# Patient Record
Sex: FEMALE | Race: WHITE | NOT HISPANIC OR LATINO | Employment: UNEMPLOYED | URBAN - METROPOLITAN AREA
[De-identification: names, ages, dates, MRNs, and addresses within clinical notes are randomized per-mention and may not be internally consistent; named-entity substitution may affect disease eponyms.]

---

## 2017-02-08 ENCOUNTER — HOSPITAL ENCOUNTER (EMERGENCY)
Facility: HOSPITAL | Age: 37
Discharge: HOME | End: 2017-02-08
Attending: EMERGENCY MEDICINE
Payer: MEDICAID

## 2017-02-08 VITALS
OXYGEN SATURATION: 95 % | DIASTOLIC BLOOD PRESSURE: 65 MMHG | BODY MASS INDEX: 19.51 KG/M2 | HEART RATE: 100 BPM | RESPIRATION RATE: 19 BRPM | TEMPERATURE: 98 F | HEIGHT: 62 IN | WEIGHT: 106 LBS | SYSTOLIC BLOOD PRESSURE: 119 MMHG

## 2017-02-08 DIAGNOSIS — K08.89 PAIN, DENTAL: Primary | ICD-10-CM

## 2017-02-08 PROCEDURE — 99283 EMERGENCY DEPT VISIT LOW MDM: CPT

## 2017-02-08 RX ORDER — HYDROCODONE BITARTRATE AND ACETAMINOPHEN 10; 325 MG/1; MG/1
1 TABLET ORAL EVERY 4 HOURS PRN
Qty: 20 TABLET | Refills: 0 | Status: SHIPPED | OUTPATIENT
Start: 2017-02-08 | End: 2017-02-24

## 2017-02-08 RX ORDER — PENICILLIN V POTASSIUM 500 MG/1
500 TABLET, FILM COATED ORAL 4 TIMES DAILY
Qty: 40 TABLET | Refills: 0 | Status: SHIPPED | OUTPATIENT
Start: 2017-02-08 | End: 2017-02-15

## 2017-02-08 RX ORDER — CLONAZEPAM 1 MG/1
1 TABLET ORAL 2 TIMES DAILY PRN
COMMUNITY
End: 2017-07-23

## 2017-02-08 NOTE — ED AVS SNAPSHOT
[facility]  [address] [address] [address]               Isabelle Bonds   2017  8:50 AM   ED    Description:  Female : 1980   Department:  [facility]           Your Care was Coordinated By:     Provider Role From To    Obi Owen MD Attending Provider 17 0851 --      Reason for Visit     Dental Pain           Diagnoses this Visit        Comments    Pain, dental    -  Primary       ED Disposition     None           To Do List           Follow-up Information     Follow up with VIVIANA Weaver. Schedule an appointment as soon as possible for a visit in 3 days.    Specialty:  Family Medicine    Why:  Follow up with your primary care doctor in the next 2-3 days for recheck.  Return to the emergency Department for any worsening signs or symptoms.    Contact information:    [address] [place] [address] [phone]          Please follow up.    Why:  Follow-up with her dentist as soon as possible as discussed.  Return to emergency Department for any worsening signs or symptoms.       These Medications        Disp Refills Start End    penicillin v potassium (VEETID) 500 MG tablet 40 tablet 0 2017 2/15/2017    Take 1 tablet (500 mg total) by mouth 4 (four) times daily. - Oral    hydrocodone-acetaminophen 10-325mg (NORCO)  mg Tab 20 tablet 0 2017    Take 1 tablet by mouth every 4 (four) hours as needed. - Oral      [facility]     [facility] On Call Nurse Care Line -  Assistance  Registered nurses in the [facility] On Call Center provide clinical advisement, health education, appointment booking, and other advisory services.  Call for this free service at [phone].             Medications           Message regarding Medications     Verify the changes and/or additions to your medication regime listed below are the same as discussed with your clinician today.  If any of these changes or additions  "are incorrect, please notify your healthcare provider.        START taking these NEW medications        Refills    penicillin v potassium (VEETID) 500 MG tablet 0    Sig: Take 1 tablet (500 mg total) by mouth 4 (four) times daily.    Class: Print    Route: Oral    hydrocodone-acetaminophen 10-325mg (NORCO)  mg Tab 0    Sig: Take 1 tablet by mouth every 4 (four) hours as needed.    Class: Print    Route: Oral           Verify that the below list of medications is an accurate representation of the medications you are currently taking.  If none reported, the list may be blank. If incorrect, please contact your healthcare provider. Carry this list with you in case of emergency.           Current Medications     clonazePAM (KLONOPIN) 1 MG tablet Take 1 mg by mouth 2 (two) times daily as needed for Anxiety.    citalopram (CELEXA) 20 MG tablet Take 20 mg by mouth 2 (two) times daily as needed.     hydrocodone-acetaminophen 10-325mg (NORCO)  mg Tab Take 1 tablet by mouth every 4 (four) hours as needed.    penicillin v potassium (VEETID) 500 MG tablet Take 1 tablet (500 mg total) by mouth 4 (four) times daily.           Clinical Reference Information           Your Vitals Were     BP Pulse Temp Resp Height Weight    117/66 (BP Location: Left arm, Patient Position: Sitting) 104 98.2 °F (36.8 °C) 20 5' 2" (1.575 m) 48.1 kg (106 lb)    Last Period SpO2 BMI          (LMP Unknown) 96% 19.39 kg/m2        Allergies as of 2/8/2017     No Known Allergies      Immunizations Administered on Date of Encounter - 2/8/2017     None      ED Micro, Lab, POCT     None      ED Imaging Orders     None      Discharge References/Attachments     DENTAL PAIN (ENGLISH)    ACETAMINOPHEN; HYDROCODONE TABLETS OR CAPSULES (ENGLISH)    PENICILLIN V TABLETS (ENGLISH)      [facility] Sign-Up     Activating your [facility] account is as easy as 1-2-3!     1) Visit [email or web address], select Sign Up Now, enter this activation code and your date of " birth, then select Next.  Activation code not generated  Current Patient Portal Status: Account disabled      2) Create a username and password to use when you visit [facility] in the future and select a security question in case you lose your password and select Next.    3) Enter your e-mail address and click Sign Up!    Additional Information  If you have questions, please e-mail [email or web address] or call [phone] to talk to our [facility] staff. Remember, [facility] is NOT to be used for urgent needs. For medical emergencies, dial 911.         Smoking Cessation     If you would like to quit smoking:   You may be eligible for free services if you are a [place] resident and started smoking cigarettes before September 1, 1988.  Call the Smoking Cessation Trust (SCT) toll free at (067) 448-5579 or (210) 420-1597.   Call 8-800-QUIT-NOW if you do not meet the above criteria.             [facility] complies with applicable Federal civil rights laws and does not discriminate on the basis of race, color, national origin, age, disability, or sex.        Language Assistance Services     ATTENTION: Language assistance services are available, free of charge. Please call [phone].      ATENCIÓN: Si habla español, tiene a cox disposición servicios gratuitos de asistencia lingüística. Llame al [phone].     CHÚ Ý: N?u b?n nói Ti?ng Vi?t, có các d?ch v? h? tr? ngôn ng? mi?n phí dành cho b?n. G?i s? [phone].

## 2017-02-10 NOTE — ED PROVIDER NOTES
Encounter Date: 2/8/2017       History     Chief Complaint   Patient presents with    Dental Pain     broke a tooth after a fall last month, took a round of antibiotics and is not any better.      Review of patient's allergies indicates:  No Known Allergies  Patient is a 37 y.o. female presenting with the following complaint: tooth pain. The history is provided by the patient.   Dental Pain   The primary symptoms include mouth pain and dental injury. Primary symptoms do not include oral bleeding, oral lesions, headaches, fever, shortness of breath, sore throat or angioedema. The symptoms began several weeks ago. The symptoms are waxing and waning. The symptoms are recurrent. The symptoms occur intermittently.   Mouth pain began more than 1 month ago. Mouth pain occurs intermittently. Mouth pain is worsening. Affected locations include: teeth. At its highest the mouth pain was at 7/10. The mouth pain is currently at 7/10.   The dental injury occurred more than 1 week ago.   Additional symptoms include: dental sensitivity to temperature, gum tenderness and jaw pain. Additional symptoms do not include: gum swelling, purulent gums, trismus, facial swelling, trouble swallowing, pain with swallowing, excessive salivation, dry mouth, taste disturbance, smell disturbance, drooling, ear pain, hearing loss, nosebleeds, swollen glands, goiter and fatigue. Medical issues include: smoking. Medical issues do not include: cancer.     Patient has history of a fractured jaw and some fractured teeth and has been treated at a dental clinic in [place].  Now she is living in this region and has no follow-up with dentistry he has continued with dental pain and has no meds.  She has no primary care physician that she is able to see today or a dentist so she came to emergency department for evaluation and some pain medication      Past Medical History   Diagnosis Date    Depression      No past medical history pertinent  negatives.  Past Surgical History   Procedure Laterality Date    Hysterectomy      Hernia repair      Cholecystectomy       Family History   Problem Relation Age of Onset    No Known Problems Mother     Arthritis Father      Social History   Substance Use Topics    Smoking status: Current Every Day Smoker     Packs/day: 1.00     Types: Cigarettes    Smokeless tobacco: None    Alcohol use Yes      Comment: occasional     Review of Systems   Constitutional: Negative for fatigue and fever.   HENT: Positive for dental problem. Negative for drooling, ear pain, facial swelling, hearing loss, nosebleeds, sore throat and trouble swallowing.    Respiratory: Negative for shortness of breath.    Cardiovascular: Negative for chest pain.   Gastrointestinal: Negative for nausea.   Genitourinary: Negative for dysuria.   Musculoskeletal: Negative for back pain.   Skin: Negative for rash.   Neurological: Negative for weakness and headaches.   Hematological: Does not bruise/bleed easily.   All other systems reviewed and are negative.      Physical Exam   Initial Vitals   BP Pulse Resp Temp SpO2   02/08/17 0848 02/08/17 0848 02/08/17 0848 02/08/17 0848 02/08/17 0848   117/66 104 20 98.2 °F (36.8 °C) 96 %     Physical Exam    Nursing note and vitals reviewed.  Constitutional: She appears well-developed and well-nourished. No distress.   HENT:   Head: Normocephalic and atraumatic.   Mouth/Throat: Uvula is midline, oropharynx is clear and moist and mucous membranes are normal. No oral lesions. No trismus in the jaw. Abnormal dentition. No dental abscesses, uvula swelling or lacerations. No oropharyngeal exudate.   Patient has poor dentition and tenderness over her gums particularly on her mandible the right hand side.  There is no swelling or fluctuance or indication of abscess or need for I&D.   Eyes: EOM are normal. Pupils are equal, round, and reactive to light.   Neck: Normal range of motion. Neck supple.   Cardiovascular:  Normal rate, regular rhythm, normal heart sounds and intact distal pulses.   Pulmonary/Chest: No stridor. She has no wheezes. She has no rhonchi.   Abdominal: Soft. Bowel sounds are normal. She exhibits no mass. There is no rebound and no guarding.   Musculoskeletal: Normal range of motion. She exhibits no edema.   Neurological: She is alert and oriented to person, place, and time. She has normal strength. No sensory deficit.   Skin: Skin is warm and dry. No rash noted.   Psychiatric: She has a normal mood and affect. Her behavior is normal. Judgment and thought content normal.         ED Course   Procedures  Labs Reviewed - No data to display                            ED Course     Clinical Impression:   The encounter diagnosis was Pain, dental.    Disposition:   Disposition: Discharged  Condition: Stable       Obi Owen MD  02/10/17 0703

## 2017-03-14 ENCOUNTER — HOSPITAL ENCOUNTER (EMERGENCY)
Facility: HOSPITAL | Age: 37
Discharge: ANOTHER HOSPITAL | End: 2017-03-14
Attending: EMERGENCY MEDICINE
Payer: MEDICAID

## 2017-03-14 VITALS
DIASTOLIC BLOOD PRESSURE: 72 MMHG | HEART RATE: 83 BPM | SYSTOLIC BLOOD PRESSURE: 152 MMHG | TEMPERATURE: 93 F | BODY MASS INDEX: 22.5 KG/M2 | RESPIRATION RATE: 18 BRPM | WEIGHT: 123 LBS | OXYGEN SATURATION: 100 %

## 2017-03-14 DIAGNOSIS — R11.10 VOMITING, INTRACTABILITY OF VOMITING NOT SPECIFIED, PRESENCE OF NAUSEA NOT SPECIFIED, UNSPECIFIED VOMITING TYPE: ICD-10-CM

## 2017-03-14 DIAGNOSIS — I62.9 SPONTANEOUS INTRAPARENCHYMAL INTRACRANIAL HEMORRHAGE, ACUTE: Primary | ICD-10-CM

## 2017-03-14 DIAGNOSIS — T17.908A ASPIRATION INTO AIRWAY, INITIAL ENCOUNTER: ICD-10-CM

## 2017-03-14 DIAGNOSIS — F12.10 MARIJUANA ABUSE: ICD-10-CM

## 2017-03-14 DIAGNOSIS — R03.0 ELEVATED BLOOD PRESSURE READING WITHOUT DIAGNOSIS OF HYPERTENSION: ICD-10-CM

## 2017-03-14 DIAGNOSIS — N39.0 URINARY TRACT INFECTION WITHOUT HEMATURIA, SITE UNSPECIFIED: ICD-10-CM

## 2017-03-14 DIAGNOSIS — G93.5: ICD-10-CM

## 2017-03-14 DIAGNOSIS — T68.XXXA HYPOTHERMIA, INITIAL ENCOUNTER: ICD-10-CM

## 2017-03-14 DIAGNOSIS — Z72.0 TOBACCO ABUSE: ICD-10-CM

## 2017-03-14 DIAGNOSIS — J96.90 RESPIRATORY FAILURE, UNSPECIFIED CHRONICITY, UNSPECIFIED WHETHER WITH HYPOXIA OR HYPERCAPNIA: ICD-10-CM

## 2017-03-14 LAB
ALBUMIN SERPL BCP-MCNC: 4.3 G/DL
ALLENS TEST: ABNORMAL
ALP SERPL-CCNC: 77 U/L
ALT SERPL W/O P-5'-P-CCNC: 23 U/L
AMPHET+METHAMPHET UR QL: NEGATIVE
ANION GAP SERPL CALC-SCNC: 17 MMOL/L
APTT BLDCRRT: 24.9 SEC
AST SERPL-CCNC: 26 U/L
BACTERIA #/AREA URNS AUTO: ABNORMAL /HPF
BARBITURATES UR QL SCN>200 NG/ML: NEGATIVE
BASOPHILS # BLD AUTO: 0.08 K/UL
BASOPHILS NFR BLD: 0.3 %
BENZODIAZ UR QL SCN>200 NG/ML: NORMAL
BILIRUB SERPL-MCNC: 0.5 MG/DL
BILIRUB UR QL STRIP: NEGATIVE
BUN SERPL-MCNC: 22 MG/DL
BZE UR QL SCN: NEGATIVE
CALCIUM SERPL-MCNC: 9 MG/DL
CANNABINOIDS UR QL SCN: NORMAL
CHLORIDE SERPL-SCNC: 104 MMOL/L
CLARITY UR REFRACT.AUTO: CLEAR
CO2 SERPL-SCNC: 17 MMOL/L
COLOR UR AUTO: YELLOW
CREAT SERPL-MCNC: 1 MG/DL
CREAT UR-MCNC: 204.4 MG/DL
DELSYS: ABNORMAL
DIFFERENTIAL METHOD: ABNORMAL
EOSINOPHIL # BLD AUTO: 0.2 K/UL
EOSINOPHIL NFR BLD: 0.8 %
ERYTHROCYTE [DISTWIDTH] IN BLOOD BY AUTOMATED COUNT: 12.2 %
ERYTHROCYTE [SEDIMENTATION RATE] IN BLOOD BY WESTERGREN METHOD: 16 MM/H
EST. GFR  (AFRICAN AMERICAN): >60 ML/MIN/1.73 M^2
EST. GFR  (NON AFRICAN AMERICAN): >60 ML/MIN/1.73 M^2
ETHANOL SERPL-MCNC: <10 MG/DL
FIO2: 40
GLUCOSE SERPL-MCNC: 143 MG/DL
GLUCOSE UR QL STRIP: NEGATIVE
HCO3 UR-SCNC: 21.4 MMOL/L (ref 24–28)
HCT VFR BLD AUTO: 39.6 %
HGB BLD-MCNC: 13.7 G/DL
HGB UR QL STRIP: ABNORMAL
INR PPP: 1.1
KETONES UR QL STRIP: ABNORMAL
LEUKOCYTE ESTERASE UR QL STRIP: ABNORMAL
LYMPHOCYTES # BLD AUTO: 6.7 K/UL
LYMPHOCYTES NFR BLD: 25 %
MCH RBC QN AUTO: 32.2 PG
MCHC RBC AUTO-ENTMCNC: 34.6 %
MCV RBC AUTO: 93 FL
METHADONE UR QL SCN>300 NG/ML: NEGATIVE
MICROSCOPIC COMMENT: ABNORMAL
MODE: ABNORMAL
MONOCYTES # BLD AUTO: 1.6 K/UL
MONOCYTES NFR BLD: 6.1 %
NEUTROPHILS # BLD AUTO: 17.9 K/UL
NEUTROPHILS NFR BLD: 67.4 %
NITRITE UR QL STRIP: NEGATIVE
OPIATES UR QL SCN: NEGATIVE
PCO2 BLDA: 30.8 MMHG (ref 35–45)
PCP UR QL SCN>25 NG/ML: NEGATIVE
PEEP: 5
PH SMN: 7.45 [PH] (ref 7.35–7.45)
PH UR STRIP: 8 [PH] (ref 5–8)
PLATELET # BLD AUTO: 329 K/UL
PMV BLD AUTO: 10.4 FL
PO2 BLDA: 135 MMHG (ref 80–100)
POC BE: -3 MMOL/L
POC SATURATED O2: 99 % (ref 95–100)
POTASSIUM SERPL-SCNC: 3.1 MMOL/L
PROT SERPL-MCNC: 7.4 G/DL
PROT UR QL STRIP: NEGATIVE
PROTHROMBIN TIME: 11.2 SEC
PS: 10
RBC # BLD AUTO: 4.25 M/UL
RBC #/AREA URNS AUTO: 2 /HPF (ref 0–4)
SAMPLE: ABNORMAL
SITE: ABNORMAL
SODIUM SERPL-SCNC: 138 MMOL/L
SP GR UR STRIP: >=1.03 (ref 1–1.03)
TOXICOLOGY INFORMATION: NORMAL
TROPONIN I SERPL DL<=0.01 NG/ML-MCNC: <0.006 NG/ML
URN SPEC COLLECT METH UR: ABNORMAL
UROBILINOGEN UR STRIP-ACNC: NEGATIVE EU/DL
VT: 450
WBC # BLD AUTO: 26.6 K/UL
WBC #/AREA URNS AUTO: 15 /HPF (ref 0–5)

## 2017-03-14 PROCEDURE — 99900026 HC AIRWAY MAINTENANCE (STAT)

## 2017-03-14 PROCEDURE — 80320 DRUG SCREEN QUANTALCOHOLS: CPT

## 2017-03-14 PROCEDURE — 99291 CRITICAL CARE FIRST HOUR: CPT | Mod: 25

## 2017-03-14 PROCEDURE — 80053 COMPREHEN METABOLIC PANEL: CPT

## 2017-03-14 PROCEDURE — 93010 ELECTROCARDIOGRAM REPORT: CPT | Mod: ,,, | Performed by: INTERNAL MEDICINE

## 2017-03-14 PROCEDURE — 36600 WITHDRAWAL OF ARTERIAL BLOOD: CPT

## 2017-03-14 PROCEDURE — 99900035 HC TECH TIME PER 15 MIN (STAT)

## 2017-03-14 PROCEDURE — 93005 ELECTROCARDIOGRAM TRACING: CPT

## 2017-03-14 PROCEDURE — 85730 THROMBOPLASTIN TIME PARTIAL: CPT

## 2017-03-14 PROCEDURE — 84484 ASSAY OF TROPONIN QUANT: CPT

## 2017-03-14 PROCEDURE — 25000003 PHARM REV CODE 250: Performed by: EMERGENCY MEDICINE

## 2017-03-14 PROCEDURE — 85027 COMPLETE CBC AUTOMATED: CPT

## 2017-03-14 PROCEDURE — 25000003 PHARM REV CODE 250

## 2017-03-14 PROCEDURE — 82803 BLOOD GASES ANY COMBINATION: CPT

## 2017-03-14 PROCEDURE — 31500 INSERT EMERGENCY AIRWAY: CPT

## 2017-03-14 PROCEDURE — 85610 PROTHROMBIN TIME: CPT

## 2017-03-14 PROCEDURE — 96368 THER/DIAG CONCURRENT INF: CPT

## 2017-03-14 PROCEDURE — 27200966 HC CLOSED SUCTION SYSTEM

## 2017-03-14 PROCEDURE — 96365 THER/PROPH/DIAG IV INF INIT: CPT

## 2017-03-14 PROCEDURE — 94002 VENT MGMT INPAT INIT DAY: CPT

## 2017-03-14 PROCEDURE — 85007 BL SMEAR W/DIFF WBC COUNT: CPT

## 2017-03-14 PROCEDURE — 82570 ASSAY OF URINE CREATININE: CPT

## 2017-03-14 PROCEDURE — 51702 INSERT TEMP BLADDER CATH: CPT

## 2017-03-14 PROCEDURE — 81000 URINALYSIS NONAUTO W/SCOPE: CPT

## 2017-03-14 PROCEDURE — 63600175 PHARM REV CODE 636 W HCPCS: Performed by: EMERGENCY MEDICINE

## 2017-03-14 RX ORDER — PROPOFOL 10 MG/ML
5 INJECTION, EMULSION INTRAVENOUS
Status: COMPLETED | OUTPATIENT
Start: 2017-03-14 | End: 2017-03-14

## 2017-03-14 RX ORDER — ETOMIDATE 2 MG/ML
20 INJECTION INTRAVENOUS
Status: COMPLETED | OUTPATIENT
Start: 2017-03-14 | End: 2017-03-14

## 2017-03-14 RX ORDER — MANNITOL 20 G/100ML
INJECTION, SOLUTION INTRAVENOUS
Status: COMPLETED
Start: 2017-03-14 | End: 2017-03-14

## 2017-03-14 RX ORDER — MANNITOL 20 G/100ML
15 INJECTION, SOLUTION INTRAVENOUS ONCE
Status: COMPLETED | OUTPATIENT
Start: 2017-03-14 | End: 2017-03-14

## 2017-03-14 RX ORDER — SODIUM CHLORIDE 9 MG/ML
1000 INJECTION, SOLUTION INTRAVENOUS
Status: COMPLETED | OUTPATIENT
Start: 2017-03-14 | End: 2017-03-14

## 2017-03-14 RX ORDER — PROPOFOL 10 MG/ML
INJECTION, EMULSION INTRAVENOUS
Status: DISCONTINUED
Start: 2017-03-14 | End: 2017-03-15 | Stop reason: HOSPADM

## 2017-03-14 RX ADMIN — MANNITOL 100 G: 20 INJECTION, SOLUTION INTRAVENOUS at 08:03

## 2017-03-14 RX ADMIN — ETOMIDATE 20 MG: 2 INJECTION, SOLUTION INTRAVENOUS at 07:03

## 2017-03-14 RX ADMIN — PROPOFOL 5 MCG/KG/MIN: 10 INJECTION, EMULSION INTRAVENOUS at 08:03

## 2017-03-14 RX ADMIN — SODIUM CHLORIDE 1000 ML: 0.9 INJECTION, SOLUTION INTRAVENOUS at 08:03

## 2017-03-15 NOTE — ED PROVIDER NOTES
Encounter Date: 3/14/2017       History     Chief Complaint   Patient presents with    Unresponsive     headache and vomiting started approx 20 minutes ago     Review of patient's allergies indicates:  No Known Allergies  HPI Comments: Patient currently presents accompanied by family member with concern regarding altered mental status.  They report that the patient began suddenly reporting a headache and then subsequently began vomiting.  Patient was awake though lethargic in our waiting room but quickly became unresponsive.  Patient was noted to have vomited at the time of arrival.  No additional history available at present.    Past Medical History:   Diagnosis Date    Depression      Past Surgical History:   Procedure Laterality Date    CHOLECYSTECTOMY      HERNIA REPAIR      HYSTERECTOMY       Family History   Problem Relation Age of Onset    No Known Problems Mother     Arthritis Father      Social History   Substance Use Topics    Smoking status: Current Every Day Smoker     Packs/day: 1.00     Types: Cigarettes    Smokeless tobacco: Not on file    Alcohol use Yes      Comment: occasional     Review of Systems   Unable to perform ROS: Patient unresponsive       Physical Exam   Initial Vitals   BP Pulse Resp Temp SpO2   03/14/17 1955 03/14/17 1955 03/14/17 1955 -- 03/14/17 1955   127/65 60 22  100 %     Physical Exam    Constitutional: She appears well-developed and well-nourished. She is diaphoretic.   HENT:   Head: Normocephalic and atraumatic.   Right Ear: External ear normal.   Left Ear: External ear normal.   Nose: Nose normal.   Oropharynx occluded by a significant amount of food matter   Eyes: Conjunctivae and lids are normal. No scleral icterus.   RIGHT pupil 6mm; LEFT pupil 3mm   Neck: No tracheal deviation present. No JVD present.   Cardiovascular: Regular rhythm, normal heart sounds and intact distal pulses. Bradycardia present.  Exam reveals no gallop and no friction rub.    No murmur  heard.  Pulmonary/Chest: Breath sounds normal. No respiratory distress.   Abdominal: Soft. She exhibits no distension. There is no tenderness.   Neurological: GCS eye subscore is 1. GCS verbal subscore is 1. GCS motor subscore is 2.   Skin: Skin is warm.         ED Course   Critical Care  Date/Time: 3/14/2017 8:20 PM  Performed by: JOÃO RUFFIN  Authorized by: JOÃO RUFFIN   Direct patient critical care time: 35 minutes  Ordering / reviewing critical care time: 5 minutes  Documentation critical care time: 5 minutes  Consult with family critical care time: 5 minutes  Total critical care time (exclusive of procedural time) : 50 minutes  Critical care was necessary to treat or prevent imminent or life-threatening deterioration of the following conditions: CNS failure or compromise.  Critical care was time spent personally by me on the following activities: pulse oximetry, ventilator management, re-evaluation of patient's condition, ordering and review of laboratory studies, ordering and review of radiographic studies, examination of patient, evaluation of patient's response to treatment, development of treatment plan with patient or surrogate and gastric intubation.        Labs Reviewed   APTT   CBC W/ AUTO DIFFERENTIAL   ALCOHOL,MEDICAL (ETHANOL)   PROTIME-INR   TROPONIN I   URINALYSIS   DRUG SCREEN PANEL, URINE EMERGENCY     EKG Readings: (Independently Interpreted)   Initial Reading: No STEMI. Rhythm: Sinus Bradycardia. Heart Rate: 57. Axis: Normal.          Medical Decision Making:   Initial Assessment:   Patient with loss of responsiveness at arrival.  Patient immediately went to CT demonstrated significant intraparenchymal hemorrhage with subfalcine and transtentorial herniation.  Patient has begun to develop anisocoria on repeat exam.  Blood pressure remains reasonable at present.  Patient is receiving mannitol 100mg at present.  We will also attempt to warm her in route as her temperature just prior  to departure had fallen markedly.  CXR shows good NGT and ETT placement without apparent complication.  ABG appears appropriate.  All historical, clinical, radiographic, and laboratory findings were reviewed with the patient/family in detail along with the indications for transfer to an outside facility (rather than admission to our facility in [place]) secondary to a lack of neurosurgical services.  VIVIEN called at 1950.  Arrived at 2030.  Departed 2056.  All remaining questions and concerns were addressed at that time and the patient/family verbalized understanding and agrees to proceed accordingly.  Similarly all pertinent details of the encounter were discussed with Dr Katie Brooks Supervisor [place] at [facility] ED who agrees to accept the patient in transfer based on the needs/patient preferences outlined above.  Patient will be [facility] by [facility] services secondary to a need for expedited transport and critical care en route.  Manuelito Briceño MD  8:10 PM    It should be noted that the patient's urinalysis appeared to be consistent with urinary tract infection.  At the time these results returned, however, the patient was in route via helicopter to [facility] so results were relayed to the receiving facility so that she might receive appropriate ABX.  Patients UDS notable for benzodiazepines (Rx for klonopin) and THC.    Manuelito Briceño MD  1:11 AM    Independently Interpreted Test(s):   I have ordered and independently interpreted EKG Reading(s) - see prior notes  Clinical Tests:   Lab Tests: Ordered and Reviewed  Radiological Study: Ordered and Reviewed                   ED Course     Clinical Impression:   The primary encounter diagnosis was Spontaneous intraparenchymal intracranial hemorrhage, acute. Diagnoses of Transtentorial herniation, Aspiration into airway, initial encounter, Respiratory failure, unspecified chronicity, unspecified whether with hypoxia or  hypercapnia, Vomiting, intractability of vomiting not specified, presence of nausea not specified, unspecified vomiting type, Urinary tract infection without hematuria, site unspecified, and Hypothermia, initial encounter were also pertinent to this visit.          Manuelito Briceño MD  03/14/17 2021       Manuelito Briceño MD  03/14/17 2059       Manuelito Briceño MD  03/15/17 0109       Manuelito Briceño MD  03/15/17 0115       Manuelito Briceño MD  03/23/17 1903

## 2017-03-15 NOTE — PROGRESS NOTES
Pt unresponsive, minimal breathing, vomiting. Pt intubated to protect airway per Dr. Briceño with 7.5 OET. Secured at 20 cm at lip. Tube placement confirmed with ETCO2 monitor and CXR. Pt bagged with 100% O2 to CT and back to room. Pt placed on the vent at charted settings. RT stayed with pt until transport arrived to bring pt to [facility].

## 2017-03-15 NOTE — ED NOTES
"Pt wheeled into triage room vomiting; asked to spell her name and she proceeded with "Tamar" and then went unresponsive; wheeled to room 15 with MD in room; pt unresponsive, vomit in mouth and pupils non-reactive and unequal  "

## 2017-03-15 NOTE — ED NOTES
Pt now moving right extremities; hand to her face and localizing to pain, sitting up with sternal rub; will start propofol

## 2017-03-15 NOTE — ED NOTES
Per Dr. Briceño's request, I spoke with Rosalinda, RN @ [facility] ED to inform her of pt's UTI and that we did not have a chance to give antibiotics prior to her leaving our facility; Rosalinda reported that she will relay that info to the ED MD; called and spoke to her @ 10:05pm

## 2017-03-15 NOTE — ED NOTES
Patient placed on continuous cardiac monitor, automatic blood pressure cuff and continuous pulse oximeter. Sinus rhythm @ 80 on cardiac monitor

## 2017-03-31 ENCOUNTER — HOSPITAL ENCOUNTER (EMERGENCY)
Facility: HOSPITAL | Age: 37
Discharge: HOME | End: 2017-03-31
Attending: EMERGENCY MEDICINE
Payer: MEDICAID

## 2017-03-31 VITALS
RESPIRATION RATE: 20 BRPM | OXYGEN SATURATION: 98 % | HEART RATE: 80 BPM | TEMPERATURE: 98 F | DIASTOLIC BLOOD PRESSURE: 87 MMHG | WEIGHT: 120 LBS | BODY MASS INDEX: 22.08 KG/M2 | SYSTOLIC BLOOD PRESSURE: 129 MMHG | HEIGHT: 62 IN

## 2017-03-31 DIAGNOSIS — R51.9 NONINTRACTABLE HEADACHE, UNSPECIFIED CHRONICITY PATTERN, UNSPECIFIED HEADACHE TYPE: Primary | ICD-10-CM

## 2017-03-31 DIAGNOSIS — Z76.0 MEDICATION REFILL: ICD-10-CM

## 2017-03-31 DIAGNOSIS — Z86.79 HISTORY OF NONTRAUMATIC RUPTURE OF CEREBRAL ANEURYSM: ICD-10-CM

## 2017-03-31 DIAGNOSIS — Z51.89 VISIT FOR WOUND CHECK: ICD-10-CM

## 2017-03-31 PROCEDURE — 25000003 PHARM REV CODE 250: Performed by: EMERGENCY MEDICINE

## 2017-03-31 PROCEDURE — 99283 EMERGENCY DEPT VISIT LOW MDM: CPT

## 2017-03-31 RX ORDER — BUTALBITAL, ACETAMINOPHEN AND CAFFEINE 50; 325; 40 MG/1; MG/1; MG/1
1 TABLET ORAL EVERY 6 HOURS PRN
Qty: 30 TABLET | Refills: 0 | Status: SHIPPED | OUTPATIENT
Start: 2017-03-31 | End: 2017-04-22

## 2017-03-31 RX ORDER — BUTALBITAL, ACETAMINOPHEN AND CAFFEINE 50; 325; 40 MG/1; MG/1; MG/1
1 TABLET ORAL
Status: COMPLETED | OUTPATIENT
Start: 2017-03-31 | End: 2017-03-31

## 2017-03-31 RX ORDER — BUTALBITAL, ACETAMINOPHEN AND CAFFEINE 50; 325; 40 MG/1; MG/1; MG/1
1 TABLET ORAL EVERY 6 HOURS PRN
COMMUNITY
Start: 2017-03-26 | End: 2017-03-31

## 2017-03-31 RX ORDER — GABAPENTIN 100 MG/1
100 CAPSULE ORAL 3 TIMES DAILY
COMMUNITY
End: 2017-04-22

## 2017-03-31 RX ORDER — LEVETIRACETAM 500 MG/1
500 TABLET ORAL 2 TIMES DAILY
COMMUNITY
Start: 2017-03-26 | End: 2017-09-16

## 2017-03-31 RX ADMIN — BUTALBITAL, ACETAMINOPHEN, AND CAFFEINE 1 TABLET: 50; 325; 40 TABLET ORAL at 03:03

## 2017-03-31 NOTE — DISCHARGE INSTRUCTIONS
"  Headache, Unspecified    A number of things can cause headaches. The cause of your headache isnt clear. But it doesnt seem to be a sign of any serious illness.  You could have a tension headache or a migraine headache.  Stress can cause a tension headache. This can happen if you tense the muscles of your shoulders, neck, and scalp without knowing it. If this stress lasts long enough, you may develop a tension headache.  It is not clear why migraines occur, but certain things called" triggers" can raise the risk of having a migraine attack. Migraine triggers may include emotional stress or depression, or by hormone changes during the menstrual cycle. Other triggers include birth control pills and other medicines, alcohol or caffeine, foods with tyramine (such as aged cheese, wine), eyestrain, weather changes, missed meals, and lack of sleep or oversleeping.  Other causes of headache include:  · Viral illness with high fever  · Head injury with concussion  · Sinus, ear, or throat infection  · Dental pain and jaw joint (TMJ) pain  More serious but less common causes of headache include stroke, brain hemorrhage, brain tumor, meningitis, and encephalitis.  Home care  Follow these tips when taking care of yourself at home:  · Dont drive yourself home if you were given pain medicine for your headache. Instead, have someone else drive you home. Try to sleep when you get home. You should feel much better when you wake up.  · Apply heat to the back of your neck to ease a neck muscle spasm. Take care of a migraine headache by putting an ice pack on your forehead or at the base of your skull.  · If you have nausea or vomiting, eat a light diet until your headache eases.  · If you have a migraine headache, use sunglasses when in the daylight or around bright indoor lighting until your symptoms get better. Bright glaring light can make this type of headache worse.  Follow-up care  Follow up with your healthcare provider, or " as advised. Talk with your provider if you have frequent headaches. He or she can help figure out a treatment plan. By knowing the earliest signs of headache, and starting treatment right away, you may be able to stop the pain yourself.  When to seek medical advice  Call your healthcare provider right away if any of these occur:  · Your head pain suddenly gets worse after sexual intercourse or strenuous activity  · Your head pain doesnt get better within 24 hours  · You arent able to keep liquids down (repeated vomiting)  · Fever of 100.4ºF (38ºC) or higher, or as directed by your healthcare provider  · Stiff neck  · Extreme drowsiness, confusion, or fainting  · Dizziness or dizziness with spinning sensation (vertigo)  · Weakness in an arm or leg or one side of your face  · You have trouble talking or seeing  Date Last Reviewed: 8/1/2016  © 0933-9232 [employer]. [address] [place] [address]. All rights reserved. This information is not intended as a substitute for professional medical care. Always follow your healthcare professional's instructions.          Wound Check, No Infection  Your wound is healing as expected. There are no signs of infection.   Home care  Continue to care for your wound as directed.  · Cover your wound with a bandage unless your healthcare provider tells you not to.  · Gently clean your wound with soap and water when you shower.   · Unless told otherwise, avoid swimming and taking tub baths until your wound has healed.  Follow-up care  Follow up with your healthcare provider as advised.  · If you have sutures or staples, return as directed to have them removed. If they are not taken out on time, they may be harder to remove and scarring may be worse. Infection may develop.  · If surgical tape strips were used, you can remove them yourself if they have not fallen off by 10 days after they were applied.   When to seek medical advice  Call your healthcare provider  right away if any of these occur:  · Fever of 100.4ºF (38ºC) or higher, or as directed by your health care provider  · Symptoms of a wound infection, including:  ¨ Redness or swelling around the wound  ¨ Warmth coming from the wound  ¨ New or worsening pain  ¨ Red streaks around the wound  ¨ Draining pus  Date Last Reviewed: 8/24/2015  © 5306-1264 [employer]. [address] [place] [address], [place] [address]. All rights reserved. This information is not intended as a substitute for professional medical care. Always follow your healthcare professional's instructions.

## 2017-03-31 NOTE — ED PROVIDER NOTES
Encounter Date: 3/31/2017       History     Chief Complaint   Patient presents with    Headache     Pt d/c'ed from [facility] for aneurysm rupture recently. States she has HA & ran out of pain meds. Called neurosurgeon's office but they haven't called in medication yet. Pt's mother reports pt has been a little more disoriented than usual today & yesterday as well.      Review of patient's allergies indicates:  No Known Allergies  Patient is a 37 y.o. female presenting with the following complaint: headaches. The history is provided by the patient and a parent.   Headache    Chronicity: patient with post neruosurgical headache soreness at site of recent neurosurgery. The current episode started 1 to 4 weeks ago. The problem occurs intermittently. The problem has been unchanged. The pain is located in the right unilateral region. The pain does not radiate. The pain quality is similar to prior headaches. The quality of the pain is described as aching. The pain is at a severity of 5/10. Associated symptoms include scalp tenderness. Pertinent negatives include no abdominal pain, abnormal behavior, anorexia, back pain, blurred vision, coughing, dizziness, ear pain, fever, hearing loss, insomnia, loss of balance, muscle aches, nausea, neck pain, numbness, seizures, sinus pressure, sore throat, swollen glands, tingling or tinnitus. Nothing aggravates the symptoms. Treatments tried: pt was having relief of headache with fioricet  - pain increased since pt ran out this AM. There is no history of cancer, cluster headaches, hypertension, immunosuppression, migraine headaches, migraines in the family, obesity, pseudotumor cerebri, recent head traumas, sinus disease or TMJ. (History of cerebral aneursym rupture 2 weeks ago)     Past Medical History:   Diagnosis Date    Anxiety     Cerebral aneurysm rupture     Depression      Past Surgical History:   Procedure Laterality Date    CHOLECYSTECTOMY      CRANIOTOMY      HERNIA REPAIR       HYSTERECTOMY       Family History   Problem Relation Age of Onset    No Known Problems Mother     Arthritis Father      Social History   Substance Use Topics    Smoking status: Current Every Day Smoker     Packs/day: 1.00     Types: Cigarettes    Smokeless tobacco: Never Used    Alcohol use Yes      Comment: occasional     Review of Systems   Constitutional: Negative for fever.   HENT: Negative for ear pain, hearing loss, sinus pressure, sore throat and tinnitus.    Eyes: Negative for blurred vision.   Respiratory: Negative for cough.    Gastrointestinal: Negative for abdominal pain, anorexia and nausea.   Musculoskeletal: Negative for back pain and neck pain.   Skin: Positive for wound.   Neurological: Positive for headaches. Negative for dizziness, tingling, seizures, numbness and loss of balance.   Psychiatric/Behavioral: The patient does not have insomnia.    All other systems reviewed and are negative.      Physical Exam   Initial Vitals   BP Pulse Resp Temp SpO2   03/31/17 1434 03/31/17 1434 03/31/17 1434 03/31/17 1434 03/31/17 1434   117/69 107 20 98.2 °F (36.8 °C) 95 %     Physical Exam    Nursing note and vitals reviewed.  Constitutional: She appears well-developed and well-nourished. No distress.   38 y/o female here with compaltin headacghe at right scalp surgeical site pt out of meds no meningismis, no motor,sensory deficits   HENT:   Head: Normocephalic and atraumatic.   Eyes: Conjunctivae and EOM are normal. Pupils are equal, round, and reactive to light.   Neck: Normal range of motion. Neck supple.   Cardiovascular: Normal rate, regular rhythm, normal heart sounds and intact distal pulses.   Pulmonary/Chest: Breath sounds normal.   Abdominal: Soft. Bowel sounds are normal.   Musculoskeletal: Normal range of motion.   Neurological: She is alert and oriented to person, place, and time. She has normal strength and normal reflexes. She displays normal reflexes. No cranial nerve deficit or  sensory deficit.   Skin: Skin is warm and dry.   Healing scalp wound 4 cm to right scalp - wound clean appears to be healing well no periwound flutuance or drainage noted   Psychiatric: She has a normal mood and affect. Her behavior is normal. Judgment and thought content normal.         ED Course   Procedures  Labs Reviewed - No data to display                            ED Course   will refill pts pain meds fioricet and instrct pt to follow up with her neurosurgeon this week given no acute motor,sensory or cerebeller deficits and patient out of pain meds will refill meds with outpatient neurosurgery follow up no acute imagine or workup indicated at this time given pts symptoms   Clinical Impression:       ICD-10-CM ICD-9-CM   1. Nonintractable headache, unspecified chronicity pattern, unspecified headache type R51 784.0   2. History of nontraumatic rupture of cerebral aneurysm Z86.79 V12.59   3. Visit for wound check Z51.89 V58.89   4. Medication refill Z76.0 V68.1       Disposition:   Disposition: Discharged  Condition: Stable       Gonzalo Miller Jr., MD  03/31/17 8525

## 2017-03-31 NOTE — ED AVS SNAPSHOT
[facility]  [address] [address] [address]               Isabelle Bonds   3/31/2017  2:38 PM   ED    Description:  Female : 1980   Department:  [facility]           Your Care was Coordinated By:     Provider Role From To    Gonzalo Miller Jr., MD Attending Provider 17 1755 --      Reason for Visit     Headache           Diagnoses this Visit        Comments    Nonintractable headache, unspecified chronicity pattern, unspecified headache type    -  Primary     History of nontraumatic rupture of cerebral aneurysm         Visit for wound check         Medication refill           ED Disposition     None           To Do List           Follow-up Information     Follow up with VIVIANA Weaver. Schedule an appointment as soon as possible for a visit in 1 day.    Specialty:  Family Medicine    Why:  As needed    Contact information:    [address]  [place] [address] [phone]          Follow up with Nav Aguilar MD. Call today.    Specialty:  Neurosurgery    Why:  Call Dr. Aguilar's office today to schedule appt to see him next week for recheck    Contact information:    [address]  [address]  [facility] [address] [phone]         These Medications        Disp Refills Start End    butalbital-acetaminophen-caffeine -40 mg (FIORICET, ESGIC) -40 mg per tablet 30 tablet 0 3/31/2017 2017    Take 1 tablet by mouth every 6 (six) hours as needed for Pain. - Oral      [facility]     [facility] On Call Nurse Care Line - 24/ Assistance  Unless otherwise directed by your provider, please contact [facility], our nurse care line that is available for  assistance.     Registered nurses in the [facility] provide: appointment scheduling, clinical advisement, health education, and other advisory services.  Call: [phone] (toll free)               Medications           Message  "regarding Medications     Verify the changes and/or additions to your medication regime listed below are the same as discussed with your clinician today.  If any of these changes or additions are incorrect, please notify your healthcare provider.        START taking these NEW medications        Refills    butalbital-acetaminophen-caffeine -40 mg (FIORICET, ESGIC) -40 mg per tablet 0    Sig: Take 1 tablet by mouth every 6 (six) hours as needed for Pain.    Class: Print    Route: Oral      These medications were administered today        Dose Freq    butalbital-acetaminophen-caffeine -40 mg per tablet 1 tablet 1 tablet ED 1 Time    Sig: Take 1 tablet by mouth ED 1 Time.    Class: Normal    Route: Oral           Verify that the below list of medications is an accurate representation of the medications you are currently taking.  If none reported, the list may be blank. If incorrect, please contact your healthcare provider. Carry this list with you in case of emergency.           Current Medications     citalopram (CELEXA) 20 MG tablet Take 20 mg by mouth 2 (two) times daily as needed.     clonazePAM (KLONOPIN) 1 MG tablet Take 1 mg by mouth 2 (two) times daily as needed for Anxiety.    gabapentin (NEURONTIN) 100 MG capsule Take 100 mg by mouth 3 (three) times daily.    levetiracetam (KEPPRA) 500 MG Tab Take 500 mg by mouth 2 (two) times daily.    butalbital-acetaminophen-caffeine -40 mg (FIORICET, ESGIC) -40 mg per tablet Take 1 tablet by mouth every 6 (six) hours as needed for Pain.    butalbital-acetaminophen-caffeine -40 mg per tablet 1 tablet Take 1 tablet by mouth ED 1 Time.           Clinical Reference Information           Your Vitals Were     BP Pulse Temp Resp Height Weight    117/69 (BP Location: Left arm, Patient Position: Sitting) 107 98.2 °F (36.8 °C) (Oral) 20 5' 2" (1.575 m) 54.4 kg (120 lb)    Last Period SpO2 BMI          (LMP Unknown) 95% 21.95 kg/m2        Allergies " "as of 3/31/2017     No Known Allergies      Immunizations Administered on Date of Encounter - 3/31/2017     None      ED Micro, Lab, POCT     None      ED Imaging Orders     None        Discharge Instructions         Headache, Unspecified    A number of things can cause headaches. The cause of your headache isnt clear. But it doesnt seem to be a sign of any serious illness.  You could have a tension headache or a migraine headache.  Stress can cause a tension headache. This can happen if you tense the muscles of your shoulders, neck, and scalp without knowing it. If this stress lasts long enough, you may develop a tension headache.  It is not clear why migraines occur, but certain things called" triggers" can raise the risk of having a migraine attack. Migraine triggers may include emotional stress or depression, or by hormone changes during the menstrual cycle. Other triggers include birth control pills and other medicines, alcohol or caffeine, foods with tyramine (such as aged cheese, wine), eyestrain, weather changes, missed meals, and lack of sleep or oversleeping.  Other causes of headache include:  · Viral illness with high fever  · Head injury with concussion  · Sinus, ear, or throat infection  · Dental pain and jaw joint (TMJ) pain  More serious but less common causes of headache include stroke, brain hemorrhage, brain tumor, meningitis, and encephalitis.  Home care  Follow these tips when taking care of yourself at home:  · Dont drive yourself home if you were given pain medicine for your headache. Instead, have someone else drive you home. Try to sleep when you get home. You should feel much better when you wake up.  · Apply heat to the back of your neck to ease a neck muscle spasm. Take care of a migraine headache by putting an ice pack on your forehead or at the base of your skull.  · If you have nausea or vomiting, eat a light diet until your headache eases.  · If you have a migraine headache, use " sunglasses when in the daylight or around bright indoor lighting until your symptoms get better. Bright glaring light can make this type of headache worse.  Follow-up care  Follow up with your healthcare provider, or as advised. Talk with your provider if you have frequent headaches. He or she can help figure out a treatment plan. By knowing the earliest signs of headache, and starting treatment right away, you may be able to stop the pain yourself.  When to seek medical advice  Call your healthcare provider right away if any of these occur:  · Your head pain suddenly gets worse after sexual intercourse or strenuous activity  · Your head pain doesnt get better within 24 hours  · You arent able to keep liquids down (repeated vomiting)  · Fever of 100.4ºF (38ºC) or higher, or as directed by your healthcare provider  · Stiff neck  · Extreme drowsiness, confusion, or fainting  · Dizziness or dizziness with spinning sensation (vertigo)  · Weakness in an arm or leg or one side of your face  · You have trouble talking or seeing  Date Last Reviewed: 8/1/2016 © 2000-2016 [employer]. [address] [place] [address]. All rights reserved. This information is not intended as a substitute for professional medical care. Always follow your healthcare professional's instructions.          Wound Check, No Infection  Your wound is healing as expected. There are no signs of infection.   Home care  Continue to care for your wound as directed.  · Cover your wound with a bandage unless your healthcare provider tells you not to.  · Gently clean your wound with soap and water when you shower.   · Unless told otherwise, avoid swimming and taking tub baths until your wound has healed.  Follow-up care  Follow up with your healthcare provider as advised.  · If you have sutures or staples, return as directed to have them removed. If they are not taken out on time, they may be harder to remove and scarring may be  worse. Infection may develop.  · If surgical tape strips were used, you can remove them yourself if they have not fallen off by 10 days after they were applied.   When to seek medical advice  Call your healthcare provider right away if any of these occur:  · Fever of 100.4ºF (38ºC) or higher, or as directed by your health care provider  · Symptoms of a wound infection, including:  ¨ Redness or swelling around the wound  ¨ Warmth coming from the wound  ¨ New or worsening pain  ¨ Red streaks around the wound  ¨ Draining pus  Date Last Reviewed: 8/24/2015  © 7482-1873 [employer]. [address] [place] [address], [place] [address]. All rights reserved. This information is not intended as a substitute for professional medical care. Always follow your healthcare professional's instructions.          [facility] Sign-Up     Activating your [facility] account is as easy as 1-2-3!     1) Visit [email or web address], select Sign Up Now, enter this activation code and your date of birth, then select Next.  Activation code not generated  Current Patient Portal Status: Account disabled      2) Create a username and password to use when you visit [facility] in the future and select a security question in case you lose your password and select Next.    3) Enter your e-mail address and click Sign Up!    Additional Information  If you have questions, please e-mail [email or web address] or call [phone] to talk to our [facility] staff. Remember, [facility] is NOT to be used for urgent needs. For medical emergencies, dial 911.         Smoking Cessation     If you would like to quit smoking:   You may be eligible for free services if you are a [place] resident and started smoking cigarettes before September 1, 1988.  Call the Smoking Cessation Trust (SCT) toll free at (514) 865-5796 or (564) 681-5619.   Call 2-800-QUIT-NOW if you do not meet the above criteria.   Contact us via email: [email or web address] Chat   View our website for  more information: [email or web address]         [facility] complies with applicable Federal civil rights laws and does not discriminate on the basis of race, color, national origin, age, disability, or sex.        Language Assistance Services     ATTENTION: Language assistance services are available, free of charge. Please call [phone].      ATENCIÓN: Si habla español, tiene a cox disposición servicios gratuitos de asistencia lingüística. Llame al [phone].     CHÚ Ý: N?u b?n nói Ti?ng Vi?t, có các d?ch v? h? tr? ngôn ng? mi?n phí dành cho b?n. G?i s? [phone].

## 2017-04-09 ENCOUNTER — HOSPITAL ENCOUNTER (EMERGENCY)
Facility: HOSPITAL | Age: 37
Discharge: HOME | End: 2017-04-09
Attending: INTERNAL MEDICINE
Payer: MEDICAID

## 2017-04-09 VITALS
WEIGHT: 120 LBS | DIASTOLIC BLOOD PRESSURE: 62 MMHG | TEMPERATURE: 99 F | HEIGHT: 62 IN | SYSTOLIC BLOOD PRESSURE: 111 MMHG | BODY MASS INDEX: 22.08 KG/M2 | HEART RATE: 61 BPM | OXYGEN SATURATION: 96 % | RESPIRATION RATE: 14 BRPM

## 2017-04-09 DIAGNOSIS — G44.321 INTRACTABLE CHRONIC POST-TRAUMATIC HEADACHE: Primary | ICD-10-CM

## 2017-04-09 LAB
ALBUMIN SERPL BCP-MCNC: 4.1 G/DL
ALP SERPL-CCNC: 66 U/L
ALT SERPL W/O P-5'-P-CCNC: 21 U/L
ANION GAP SERPL CALC-SCNC: 12 MMOL/L
AST SERPL-CCNC: 21 U/L
BILIRUB SERPL-MCNC: 0.4 MG/DL
BUN SERPL-MCNC: 13 MG/DL
CALCIUM SERPL-MCNC: 8.6 MG/DL
CHLORIDE SERPL-SCNC: 110 MMOL/L
CO2 SERPL-SCNC: 17 MMOL/L
CREAT SERPL-MCNC: 0.8 MG/DL
EST. GFR  (AFRICAN AMERICAN): >60 ML/MIN/1.73 M^2
EST. GFR  (NON AFRICAN AMERICAN): >60 ML/MIN/1.73 M^2
GLUCOSE SERPL-MCNC: 84 MG/DL
POTASSIUM SERPL-SCNC: 4.1 MMOL/L
PROT SERPL-MCNC: 7.1 G/DL
SODIUM SERPL-SCNC: 139 MMOL/L

## 2017-04-09 PROCEDURE — 25000003 PHARM REV CODE 250: Performed by: INTERNAL MEDICINE

## 2017-04-09 PROCEDURE — 80053 COMPREHEN METABOLIC PANEL: CPT

## 2017-04-09 PROCEDURE — 96374 THER/PROPH/DIAG INJ IV PUSH: CPT

## 2017-04-09 PROCEDURE — 63600175 PHARM REV CODE 636 W HCPCS: Performed by: INTERNAL MEDICINE

## 2017-04-09 PROCEDURE — 99284 EMERGENCY DEPT VISIT MOD MDM: CPT | Mod: 25

## 2017-04-09 PROCEDURE — 96375 TX/PRO/DX INJ NEW DRUG ADDON: CPT

## 2017-04-09 PROCEDURE — 96376 TX/PRO/DX INJ SAME DRUG ADON: CPT

## 2017-04-09 RX ORDER — TOPIRAMATE 50 MG/1
50 TABLET, FILM COATED ORAL NIGHTLY
Qty: 30 TABLET | Refills: 0 | Status: SHIPPED | OUTPATIENT
Start: 2017-04-09 | End: 2017-04-22 | Stop reason: HOSPADM

## 2017-04-09 RX ORDER — METOCLOPRAMIDE HYDROCHLORIDE 5 MG/ML
10 INJECTION INTRAMUSCULAR; INTRAVENOUS
Status: COMPLETED | OUTPATIENT
Start: 2017-04-09 | End: 2017-04-09

## 2017-04-09 RX ORDER — DEXAMETHASONE SODIUM PHOSPHATE 4 MG/ML
8 INJECTION, SOLUTION INTRA-ARTICULAR; INTRALESIONAL; INTRAMUSCULAR; INTRAVENOUS; SOFT TISSUE
Status: COMPLETED | OUTPATIENT
Start: 2017-04-09 | End: 2017-04-09

## 2017-04-09 RX ORDER — DIPHENHYDRAMINE HYDROCHLORIDE 50 MG/ML
25 INJECTION INTRAMUSCULAR; INTRAVENOUS
Status: COMPLETED | OUTPATIENT
Start: 2017-04-09 | End: 2017-04-09

## 2017-04-09 RX ADMIN — SODIUM CHLORIDE 500 ML: 0.9 INJECTION, SOLUTION INTRAVENOUS at 04:04

## 2017-04-09 RX ADMIN — METOCLOPRAMIDE 10 MG: 5 INJECTION, SOLUTION INTRAMUSCULAR; INTRAVENOUS at 04:04

## 2017-04-09 RX ADMIN — DIPHENHYDRAMINE HYDROCHLORIDE 25 MG: 50 INJECTION, SOLUTION INTRAMUSCULAR; INTRAVENOUS at 04:04

## 2017-04-09 RX ADMIN — DEXAMETHASONE SODIUM PHOSPHATE 8 MG: 4 INJECTION, SOLUTION INTRAMUSCULAR; INTRAVENOUS at 04:04

## 2017-04-09 NOTE — DISCHARGE INSTRUCTIONS
"  Self-Care for Headaches  Most headaches aren't serious and can be relieved with self-care. But some headaches may be a sign of another health problem like eye trouble or high blood pressure. To find the best treatment, learn what kind of headaches you get. For tension headaches, self-care will usually help. To treat migraines, ask your healthcare provider for advice. It is also possible to get both tension and migraine headaches. Self-care involves relieving the pain and avoiding headache triggers if you can.    Ways to reduce pain and tension  Try these steps:  · Apply a cold compress or ice pack to the pain site.  · Drink fluids. If nausea makes it hard to drink, try sucking on ice.  · Rest. Protect yourself from bright light and loud noises.  · Calm your emotions by imagining a peaceful scene.  · Massage tight neck, shoulder, and head muscles.  · To relax muscles, soak in a hot bath or use a hot shower.  Use medicines  Aspirin or aspirin substitutes, such as ibuprofen and acetaminophen, can relieve headache. Remember: Never give aspirin to anyone 18 years old or younger because of the risk of developing Reye syndrome. Use pain medicines only when necessary.  Track your headaches  Keeping a headache diary can help you and your healthcare provider identify what's causing your headaches:  · Note when each headache happens.  · Identify your activities and the foods you've eaten 6 to 8 hours before the headache began.  · Look for any trends or "triggers."  Signs of tension headache  Any of the following can be signs:  · Dull pain or feeling of pressure in a tight band around your head  · Pain in your neck or shoulders  · Headache without a definite beginning or end  · Headache after an activity such as driving or working on a computer  Signs of migraine  Any of the following can be signs:  · Throbbing pain on one or both sides of your head  · Nausea or vomiting  · Extreme sensitivity to light, sound, and " "smells  · Bright spots, flashes, or other visual changes  · Pain or nausea so severe that you can't continue your daily activities  Call your healthcare provider   If you have any of the following symptoms, contact your healthcare provider:  · A headache that lingers after a recent injury or bump to the head.  · A fever with a stiff neck or pain when you bend your head toward your chest.  · A headache along with slurred speech, changes in your vision, or numbness or weakness in your arms or legs.  · A headache for longer than 3 days.  · Frequent headaches, especially in the morning.  · Headaches with seizures   · Seek immediate medical attention if you have a headache that you would call "the worst headache you have ever had."   Date Last Reviewed: 10/4/2015  © 8674-3939 [employer]. [address] [place] [address]. All rights reserved. This information is not intended as a substitute for professional medical care. Always follow your healthcare professional's instructions.          Understanding Headache Pain  Headache pain can start in different structures in the head. The brain itself doesn't hurt, but other parts of the head do. Headache is a common symptom of illness, such as a cold or the flu. At other times, headaches happen without seeming to be connected to any illness. These are known as primary headaches. Examples of primary headaches include migraine and tension headaches. Very rarely are headaches a sign of a serious medical problem.    What is referred pain?  Referred pain has its source in one place, but is felt in another. For example, pain behind the eyes may actually be caused by tense muscles in the neck and shoulders. This means that the place that hurts may not be the part of the head that needs treatment.  Date Last Reviewed: 10/9/2015  © 2215-6605 [employer]. [address] [place] [address]. All rights reserved. This information is not intended " as a substitute for professional medical care. Always follow your healthcare professional's instructions.

## 2017-04-09 NOTE — ED NOTES
LOC: The patient is awake, alert and aware of environment with an appropriate affect, the patient is oriented x 3 and speaking appropriately.  APPEARANCE: Patient resting comfortably and in no acute distress, patient is clean and well groomed, patient's clothing is properly fastened.  HEENT: Brief WNL  SKIN: Brief WNL except scabbed scar to R lateral skull area from recent surgery. Wound is healing appropriately C/D/I.   MUSCULOSKELETAL: Brief WNL except pt c/o generalized weakness  RESPIRATORY: Brief WNL  CARDIAC: Brief WNL  GASTRO: Brief WNL except pt c/o nausea  : Brief WNL  Peripheral Vasc: Brief WNL  NEURO: Brief WNL except pt's responses are delayed. She is experiencing mild expressive aphasia when trying to explain her story to staff. Her gaze is deviated. C/O HA. PERRLA.   PSYCH: Brief WNL except pt appears to be extremely anxious.

## 2017-04-09 NOTE — ED AVS SNAPSHOT
[facility]  [address] [address] [address]               Isabelle Bonds   2017  3:21 PM   ED    Description:  Female : 1980   Department:  [facility]           Your Care was Coordinated By:     Provider Role From To    Mookie Boyle MD Attending Provider 17 1522 --      Reason for Visit     Altered Mental Status           Diagnoses this Visit        Comments    Intractable chronic post-traumatic headache    -  Primary       ED Disposition     ED Disposition Condition Comment    Discharge             To Do List           Follow-up Information     Follow up with VIVIANA Weaver In 2 days.    Specialty:  Family Medicine    Contact information:    [address] [place] [id number] [phone]          Follow up with [facility].    Specialty:  Emergency Medicine    Why:  If symptoms worsen    Contact information:    [address] [address] [address] [phone]      [facility]     [facility] Care [facility] -  Assistance  Unless otherwise directed by your provider, please contact [facility], our nurse care line that is available for  assistance.     Registered nurses in the [facility] provide: appointment scheduling, clinical advisement, health education, and other advisory services.  Call: [phone] (toll free)               Medications           Message regarding Medications     Verify the changes and/or additions to your medication regime listed below are the same as discussed with your clinician today.  If any of these changes or additions are incorrect, please notify your healthcare provider.        These medications were administered today        Dose Freq    metoclopramide HCl injection 10 mg 10 mg ED 1 Time    Sig: Inject 2 mLs (10 mg total) into the vein ED 1 Time.    Class: Normal    Route: Intravenous    diphenhydrAMINE injection 25  "mg 25 mg ED 1 Time    Sig: Inject 0.5 mLs (25 mg total) into the vein ED 1 Time.    Class: Normal    Route: Intravenous    sodium chloride 0.9% bolus 500 mL 500 mL ED 1 Time    Sig: Inject 500 mLs into the vein ED 1 Time.    Class: Normal    Route: Intravenous    dexamethasone injection 8 mg 8 mg ED 1 Time    Sig: Inject 2 mLs (8 mg total) into the vein ED 1 Time.    Class: Normal    Route: Intravenous           Verify that the below list of medications is an accurate representation of the medications you are currently taking.  If none reported, the list may be blank. If incorrect, please contact your healthcare provider. Carry this list with you in case of emergency.           Current Medications     butalbital-acetaminophen-caffeine -40 mg (FIORICET, ESGIC) -40 mg per tablet Take 1 tablet by mouth every 6 (six) hours as needed for Pain.    citalopram (CELEXA) 20 MG tablet Take 20 mg by mouth 2 (two) times daily as needed.     clonazePAM (KLONOPIN) 1 MG tablet Take 1 mg by mouth 2 (two) times daily as needed for Anxiety.    gabapentin (NEURONTIN) 100 MG capsule Take 100 mg by mouth 3 (three) times daily.    levetiracetam (KEPPRA) 500 MG Tab Take 500 mg by mouth 2 (two) times daily.           Clinical Reference Information           Your Vitals Were     BP Pulse Temp Resp Height Weight    110/77 (BP Location: Right arm, Patient Position: Sitting) 69 98.6 °F (37 °C) (Oral) 15 5' 2" (1.575 m) 54.4 kg (120 lb)    Last Period SpO2 BMI          (LMP Unknown) 98% 21.95 kg/m2        Allergies as of 4/9/2017     No Known Allergies      Immunizations Administered on Date of Encounter - 4/9/2017     None      ED Micro, Lab, POCT     Start Ordered       Status Ordering Provider    04/09/17 1550 04/09/17 1549  Comprehensive metabolic panel  STAT      Final result       ED Imaging Orders     Start Ordered       Status Ordering Provider    04/09/17 1533 04/09/17 1533  CT Head Without Contrast  1 time imaging      " "Final result         Discharge Instructions         Self-Care for Headaches  Most headaches aren't serious and can be relieved with self-care. But some headaches may be a sign of another health problem like eye trouble or high blood pressure. To find the best treatment, learn what kind of headaches you get. For tension headaches, self-care will usually help. To treat migraines, ask your healthcare provider for advice. It is also possible to get both tension and migraine headaches. Self-care involves relieving the pain and avoiding headache triggers if you can.    Ways to reduce pain and tension  Try these steps:  · Apply a cold compress or ice pack to the pain site.  · Drink fluids. If nausea makes it hard to drink, try sucking on ice.  · Rest. Protect yourself from bright light and loud noises.  · Calm your emotions by imagining a peaceful scene.  · Massage tight neck, shoulder, and head muscles.  · To relax muscles, soak in a hot bath or use a hot shower.  Use medicines  Aspirin or aspirin substitutes, such as ibuprofen and acetaminophen, can relieve headache. Remember: Never give aspirin to anyone 18 years old or younger because of the risk of developing Reye syndrome. Use pain medicines only when necessary.  Track your headaches  Keeping a headache diary can help you and your healthcare provider identify what's causing your headaches:  · Note when each headache happens.  · Identify your activities and the foods you've eaten 6 to 8 hours before the headache began.  · Look for any trends or "triggers."  Signs of tension headache  Any of the following can be signs:  · Dull pain or feeling of pressure in a tight band around your head  · Pain in your neck or shoulders  · Headache without a definite beginning or end  · Headache after an activity such as driving or working on a computer  Signs of migraine  Any of the following can be signs:  · Throbbing pain on one or both sides of your head  · Nausea or " "vomiting  · Extreme sensitivity to light, sound, and smells  · Bright spots, flashes, or other visual changes  · Pain or nausea so severe that you can't continue your daily activities  Call your healthcare provider   If you have any of the following symptoms, contact your healthcare provider:  · A headache that lingers after a recent injury or bump to the head.  · A fever with a stiff neck or pain when you bend your head toward your chest.  · A headache along with slurred speech, changes in your vision, or numbness or weakness in your arms or legs.  · A headache for longer than 3 days.  · Frequent headaches, especially in the morning.  · Headaches with seizures   · Seek immediate medical attention if you have a headache that you would call "the worst headache you have ever had."   Date Last Reviewed: 10/4/2015  © 9359-3841 [employer]. [address] [place] [address]. All rights reserved. This information is not intended as a substitute for professional medical care. Always follow your healthcare professional's instructions.          Understanding Headache Pain  Headache pain can start in different structures in the head. The brain itself doesn't hurt, but other parts of the head do. Headache is a common symptom of illness, such as a cold or the flu. At other times, headaches happen without seeming to be connected to any illness. These are known as primary headaches. Examples of primary headaches include migraine and tension headaches. Very rarely are headaches a sign of a serious medical problem.    What is referred pain?  Referred pain has its source in one place, but is felt in another. For example, pain behind the eyes may actually be caused by tense muscles in the neck and shoulders. This means that the place that hurts may not be the part of the head that needs treatment.  Date Last Reviewed: 10/9/2015  © 0570-1971 [employer]. [address] [place] [address]. " All rights reserved. This information is not intended as a substitute for professional medical care. Always follow your healthcare professional's instructions.          [facility] Sign-Up     Activating your [facility] account is as easy as 1-2-3!     1) Visit [email or web address], select Sign Up Now, enter this activation code and your date of birth, then select Next.  Activation code not generated  Current Patient Portal Status: Account disabled      2) Create a username and password to use when you visit [facility] in the future and select a security question in case you lose your password and select Next.    3) Enter your e-mail address and click Sign Up!    Additional Information  If you have questions, please e-mail [email or web address] or call [phone] to talk to our [facility] staff. Remember, [facility] is NOT to be used for urgent needs. For medical emergencies, dial 911.         Smoking Cessation     If you would like to quit smoking:   You may be eligible for free services if you are a [place] resident and started smoking cigarettes before September 1, 1988.  Call the Smoking Cessation Trust (SCT) toll free at (876) 869-8842 or (285) 341-8976.   Call 1-800-QUIT-NOW if you do not meet the above criteria.   Contact us via email: [email or web address]   View our website for more information: [email or web address]         [facility] complies with applicable Federal civil rights laws and does not discriminate on the basis of race, color, national origin, age, disability, or sex.        Language Assistance Services     ATTENTION: Language assistance services are available, free of charge. Please call [phone].      ATENCIÓN: Si habla español, tiene a cox disposición servicios gratuitos de asistencia lingüística. Llame al [phone].     CHÚ Ý: N?u b?n nói Ti?ng Vi?t, có các d?ch v? h? tr? ngôn ng? mi?n phí dành cho b?n. G?i s? [phone].

## 2017-04-09 NOTE — ED PROVIDER NOTES
Encounter Date: 4/9/2017       History   Pt brought by her mother due to change in behavior at home. Pt with recent surgical repair of a bleeding aneurysm, states Fioricet is not helping with her constant headache. Pt taking 2 tabs every 4-6 hrs as per mother. Mother noticed worsening of sight and confusion, is harder to recall words and see.   Chief Complaint   Patient presents with    Altered Mental Status     Since this AM after hitting her head on unknown object. Pt's mother reports increased confusion since. Pt states increased HA & nausea as well. Neurosurgeon follow up on 4/12. No scan since d/c from [facility] 2wks ago.      Review of patient's allergies indicates:  No Known Allergies  The history is provided by the patient and a parent. The history is limited by the condition of the patient.     Past Medical History:   Diagnosis Date    Anxiety     Cerebral aneurysm rupture     Depression     Stroke      Past Surgical History:   Procedure Laterality Date    BRAIN SURGERY      CHOLECYSTECTOMY      CRANIOTOMY      HERNIA REPAIR      HYSTERECTOMY       Family History   Problem Relation Age of Onset    No Known Problems Mother     Arthritis Father      Social History   Substance Use Topics    Smoking status: Current Every Day Smoker     Packs/day: 1.00     Types: Cigarettes    Smokeless tobacco: Never Used    Alcohol use Yes      Comment: occasional     Review of Systems   Constitutional: Negative for appetite change, chills and fever.   HENT: Negative for dental problem, ear pain and sore throat.    Eyes: Positive for visual disturbance.   Respiratory: Negative for cough and shortness of breath.    Cardiovascular: Negative for chest pain and palpitations.   Gastrointestinal: Negative for abdominal pain, blood in stool, diarrhea and vomiting.   Genitourinary: Negative for dysuria, pelvic pain and vaginal discharge.   Musculoskeletal: Negative for back pain and myalgias.   Skin: Negative for rash.    Neurological: Positive for headaches. Negative for weakness.   Psychiatric/Behavioral: Negative for sleep disturbance.   All other systems reviewed and are negative.      Physical Exam   Initial Vitals   BP Pulse Resp Temp SpO2   04/09/17 1520 04/09/17 1520 04/09/17 1520 04/09/17 1520 04/09/17 1520   110/77 69 15 98.6 °F (37 °C) 98 %     Physical Exam    Nursing note and vitals reviewed.  Constitutional: She appears well-developed and well-nourished.   HENT:   Head: Normocephalic.   Nose: Nose normal.   Mouth/Throat: Oropharynx is clear and moist.   Craneotomy scar noticed   Eyes: Conjunctivae and EOM are normal. Pupils are equal, round, and reactive to light.   Suspected left homonomous hemianopia   Neck: Normal range of motion. Neck supple.   Cardiovascular: Normal rate, regular rhythm, normal heart sounds and intact distal pulses.   Pulmonary/Chest: Breath sounds normal.   Abdominal: Soft. Bowel sounds are normal. She exhibits no distension. There is no tenderness. There is no rebound and no guarding.   Musculoskeletal: Normal range of motion. She exhibits no edema.   Neurological: She is alert and oriented to person, place, and time. She has normal strength. A cranial nerve deficit (Suspected left homonomous hemianopia) is present.   Skin: Skin is warm and dry. No rash noted.   Psychiatric: Her behavior is normal.         ED Course   Procedures  Labs Reviewed   COMPREHENSIVE METABOLIC PANEL - Abnormal; Notable for the following:        Result Value    CO2 17 (*)     Calcium 8.6 (*)     All other components within normal limits        Results for orders placed or performed during the hospital encounter of 04/09/17   Comprehensive metabolic panel   Result Value Ref Range    Sodium 139 136 - 145 mmol/L    Potassium 4.1 3.5 - 5.1 mmol/L    Chloride 110 95 - 110 mmol/L    CO2 17 (L) 23 - 29 mmol/L    Glucose 84 70 - 110 mg/dL    BUN, Bld 13 6 - 20 mg/dL    Creatinine 0.8 0.5 - 1.4 mg/dL    Calcium 8.6 (L) 8.7 -  10.5 mg/dL    Total Protein 7.1 6.0 - 8.4 g/dL    Albumin 4.1 3.5 - 5.2 g/dL    Total Bilirubin 0.4 0.1 - 1.0 mg/dL    Alkaline Phosphatase 66 55 - 135 U/L    AST 21 10 - 40 U/L    ALT 21 10 - 44 U/L    Anion Gap 12 8 - 16 mmol/L    eGFR if African American >60.0 >60 mL/min/1.73 m^2    eGFR if non African American >60.0 >60 mL/min/1.73 m^2     Imaging Results         CT Head Without Contrast (Final result) Result time:  04/09/17 15:51:13    Final result by Kashif Shabazz MD (04/09/17 15:51:13)    Impression:           1.  Negative for acute intracranial process. Negative for hemorrhage, or skull fracture.  2.  Interval right craniotomy with resolution of the right temporal/parietal intracranial hemorrhage with resulting encephalomalacia and dystrophic calcification.  3.  Numerous stable findings as noted above the    All CT scans at this facility used dose modulation, iterative reconstruction, and/or weight based dosing when appropriate to reduce radiation dose to as low as reasonably achievable.      Electronically signed by: KASHIF SHABAZZ MD  Date:     04/09/17  Time:    15:51     Narrative:    Head CT scan without contrast    Clinical Indication: Transient alteration of awareness.    Findings:  There is a made to 03/14/2017.  Main large hemorrhage in the right temporal parietal region on the comparison study has resolved with some resulting encephalomalacia and dystrophic calcifications.  Resolution of the midline shift.   The ventricles are midline and the CSF spaces are prominent. The gray-white matter junction is well preserved. Negative for intracranial vascular abnormalities. Negative for mass, mass effect, cerebral edema, hemorrhage or abnormal fluid collections.  Intracranial calcifications and small vessel ischemic changes noted.  Falx and tentorial calcifications incidentally noted.    Interval craniotomy on the right frontal bone.  The skull and scalp are otherwise intact.  A for nasal septal deviation.  The   paranasal sinuses, mastoid air cells, middle ears and ear canals are clear. The globes are intact.            Results for orders placed or performed during the hospital encounter of 04/09/17   Comprehensive metabolic panel   Result Value Ref Range    Sodium 139 136 - 145 mmol/L    Potassium 4.1 3.5 - 5.1 mmol/L    Chloride 110 95 - 110 mmol/L    CO2 17 (L) 23 - 29 mmol/L    Glucose 84 70 - 110 mg/dL    BUN, Bld 13 6 - 20 mg/dL    Creatinine 0.8 0.5 - 1.4 mg/dL    Calcium 8.6 (L) 8.7 - 10.5 mg/dL    Total Protein 7.1 6.0 - 8.4 g/dL    Albumin 4.1 3.5 - 5.2 g/dL    Total Bilirubin 0.4 0.1 - 1.0 mg/dL    Alkaline Phosphatase 66 55 - 135 U/L    AST 21 10 - 40 U/L    ALT 21 10 - 44 U/L    Anion Gap 12 8 - 16 mmol/L    eGFR if African American >60.0 >60 mL/min/1.73 m^2    eGFR if non African American >60.0 >60 mL/min/1.73 m^2                            ED Course   The patient is resting comfortably and is in no acute distress.  She states that her symptoms have improved after treatment within ER. Discussed test results, shared treatment plan, specific conditions for return, and importance of follow up with patient and family. Advised to finish her treatment with topamax as well. She understands and agrees with the plan as discussed. Answered her questions at this time. She has remained hemodynamically stable throughout the ED course and is appropriate for discharge home.   Clinical Impression:   The encounter diagnosis was Intractable chronic post-traumatic headache.    Disposition:   Disposition: Discharged  Condition: Stable       Mookie Boyle MD  04/09/17 [id number]

## 2017-04-22 ENCOUNTER — HOSPITAL ENCOUNTER (EMERGENCY)
Facility: HOSPITAL | Age: 37
Discharge: HOME | End: 2017-04-22
Attending: EMERGENCY MEDICINE
Payer: MEDICAID

## 2017-04-22 VITALS
OXYGEN SATURATION: 100 % | BODY MASS INDEX: 19.32 KG/M2 | HEIGHT: 62 IN | HEART RATE: 84 BPM | SYSTOLIC BLOOD PRESSURE: 135 MMHG | DIASTOLIC BLOOD PRESSURE: 76 MMHG | WEIGHT: 105 LBS | TEMPERATURE: 98 F | RESPIRATION RATE: 20 BRPM

## 2017-04-22 DIAGNOSIS — G89.29 CHRONIC NONINTRACTABLE HEADACHE, UNSPECIFIED HEADACHE TYPE: Primary | ICD-10-CM

## 2017-04-22 DIAGNOSIS — Z86.79 HISTORY OF SPONTANEOUS INTRAPARENCHYMAL INTRACRANIAL HEMORRHAGE DUE TO CEREBRAL ANEURYSM: ICD-10-CM

## 2017-04-22 DIAGNOSIS — R51.9 CHRONIC NONINTRACTABLE HEADACHE, UNSPECIFIED HEADACHE TYPE: Primary | ICD-10-CM

## 2017-04-22 LAB
ALBUMIN SERPL BCP-MCNC: 4.3 G/DL
ALP SERPL-CCNC: 81 U/L
ALT SERPL W/O P-5'-P-CCNC: 142 U/L
ANION GAP SERPL CALC-SCNC: 12 MMOL/L
APTT BLDCRRT: 30.8 SEC
AST SERPL-CCNC: 39 U/L
BACTERIA #/AREA URNS AUTO: ABNORMAL /HPF
BASOPHILS # BLD AUTO: 0.03 K/UL
BASOPHILS NFR BLD: 0.5 %
BILIRUB SERPL-MCNC: 0.8 MG/DL
BILIRUB UR QL STRIP: ABNORMAL
BUN SERPL-MCNC: 11 MG/DL
CALCIUM SERPL-MCNC: 9.4 MG/DL
CHLORIDE SERPL-SCNC: 110 MMOL/L
CLARITY UR REFRACT.AUTO: ABNORMAL
CO2 SERPL-SCNC: 18 MMOL/L
COLOR UR AUTO: YELLOW
CREAT SERPL-MCNC: 0.9 MG/DL
DIFFERENTIAL METHOD: ABNORMAL
EOSINOPHIL # BLD AUTO: 0.2 K/UL
EOSINOPHIL NFR BLD: 3.5 %
ERYTHROCYTE [DISTWIDTH] IN BLOOD BY AUTOMATED COUNT: 12.2 %
EST. GFR  (AFRICAN AMERICAN): >60 ML/MIN/1.73 M^2
EST. GFR  (NON AFRICAN AMERICAN): >60 ML/MIN/1.73 M^2
GLUCOSE SERPL-MCNC: 115 MG/DL
GLUCOSE UR QL STRIP: ABNORMAL
HCT VFR BLD AUTO: 42.9 %
HGB BLD-MCNC: 14.8 G/DL
HGB UR QL STRIP: NEGATIVE
INR PPP: 1.1
KETONES UR QL STRIP: ABNORMAL
LEUKOCYTE ESTERASE UR QL STRIP: ABNORMAL
LYMPHOCYTES # BLD AUTO: 1.7 K/UL
LYMPHOCYTES NFR BLD: 26.6 %
MCH RBC QN AUTO: 32 PG
MCHC RBC AUTO-ENTMCNC: 34.5 %
MCV RBC AUTO: 93 FL
MICROSCOPIC COMMENT: ABNORMAL
MONOCYTES # BLD AUTO: 0.5 K/UL
MONOCYTES NFR BLD: 7.8 %
NEUTROPHILS # BLD AUTO: 3.9 K/UL
NEUTROPHILS NFR BLD: 61.4 %
NITRITE UR QL STRIP: POSITIVE
PH UR STRIP: 6 [PH] (ref 5–8)
PLATELET # BLD AUTO: 210 K/UL
PMV BLD AUTO: 10.3 FL
POTASSIUM SERPL-SCNC: 3.9 MMOL/L
PROT SERPL-MCNC: 7.4 G/DL
PROT UR QL STRIP: ABNORMAL
PROTHROMBIN TIME: 11.4 SEC
RBC # BLD AUTO: 4.63 M/UL
RBC #/AREA URNS AUTO: 2 /HPF (ref 0–4)
SODIUM SERPL-SCNC: 140 MMOL/L
SP GR UR STRIP: 1.02 (ref 1–1.03)
SQUAMOUS #/AREA URNS AUTO: 15 /HPF
URN SPEC COLLECT METH UR: ABNORMAL
UROBILINOGEN UR STRIP-ACNC: NEGATIVE EU/DL
WBC # BLD AUTO: 6.31 K/UL
WBC #/AREA URNS AUTO: 10 /HPF (ref 0–5)

## 2017-04-22 PROCEDURE — 80053 COMPREHEN METABOLIC PANEL: CPT

## 2017-04-22 PROCEDURE — 25000003 PHARM REV CODE 250: Performed by: EMERGENCY MEDICINE

## 2017-04-22 PROCEDURE — 85730 THROMBOPLASTIN TIME PARTIAL: CPT

## 2017-04-22 PROCEDURE — 81000 URINALYSIS NONAUTO W/SCOPE: CPT

## 2017-04-22 PROCEDURE — 99284 EMERGENCY DEPT VISIT MOD MDM: CPT | Mod: 25

## 2017-04-22 PROCEDURE — 85610 PROTHROMBIN TIME: CPT

## 2017-04-22 PROCEDURE — 85025 COMPLETE CBC W/AUTO DIFF WBC: CPT

## 2017-04-22 PROCEDURE — 96375 TX/PRO/DX INJ NEW DRUG ADDON: CPT

## 2017-04-22 PROCEDURE — 63600175 PHARM REV CODE 636 W HCPCS: Performed by: EMERGENCY MEDICINE

## 2017-04-22 PROCEDURE — 96374 THER/PROPH/DIAG INJ IV PUSH: CPT

## 2017-04-22 RX ORDER — HYDROCODONE BITARTRATE AND ACETAMINOPHEN 10; 325 MG/1; MG/1
1 TABLET ORAL
Status: COMPLETED | OUTPATIENT
Start: 2017-04-22 | End: 2017-04-22

## 2017-04-22 RX ORDER — ONDANSETRON 2 MG/ML
4 INJECTION INTRAMUSCULAR; INTRAVENOUS
Status: COMPLETED | OUTPATIENT
Start: 2017-04-22 | End: 2017-04-22

## 2017-04-22 RX ORDER — HYDROCODONE BITARTRATE AND ACETAMINOPHEN 5; 325 MG/1; MG/1
1 TABLET ORAL EVERY 6 HOURS PRN
Qty: 20 TABLET | Refills: 0 | Status: SHIPPED | OUTPATIENT
Start: 2017-04-22 | End: 2017-04-30

## 2017-04-22 RX ORDER — LORAZEPAM 2 MG/ML
1 INJECTION INTRAMUSCULAR
Status: COMPLETED | OUTPATIENT
Start: 2017-04-22 | End: 2017-04-22

## 2017-04-22 RX ADMIN — HYDROCODONE BITARTRATE AND ACETAMINOPHEN 1 TABLET: 10; 325 TABLET ORAL at 07:04

## 2017-04-22 RX ADMIN — LORAZEPAM 1 MG: 2 INJECTION INTRAMUSCULAR; INTRAVENOUS at 06:04

## 2017-04-22 RX ADMIN — ONDANSETRON 4 MG: 2 INJECTION INTRAMUSCULAR; INTRAVENOUS at 06:04

## 2017-04-22 NOTE — DISCHARGE INSTRUCTIONS
"  Headache, Unspecified    A number of things can cause headaches. The cause of your headache isnt clear. But it doesnt seem to be a sign of any serious illness.  You could have a tension headache or a migraine headache.  Stress can cause a tension headache. This can happen if you tense the muscles of your shoulders, neck, and scalp without knowing it. If this stress lasts long enough, you may develop a tension headache.  It is not clear why migraines occur, but certain things called" triggers" can raise the risk of having a migraine attack. Migraine triggers may include emotional stress or depression, or by hormone changes during the menstrual cycle. Other triggers include birth control pills and other medicines, alcohol or caffeine, foods with tyramine (such as aged cheese, wine), eyestrain, weather changes, missed meals, and lack of sleep or oversleeping.  Other causes of headache include:  · Viral illness with high fever  · Head injury with concussion  · Sinus, ear, or throat infection  · Dental pain and jaw joint (TMJ) pain  More serious but less common causes of headache include stroke, brain hemorrhage, brain tumor, meningitis, and encephalitis.  Home care  Follow these tips when taking care of yourself at home:  · Dont drive yourself home if you were given pain medicine for your headache. Instead, have someone else drive you home. Try to sleep when you get home. You should feel much better when you wake up.  · Apply heat to the back of your neck to ease a neck muscle spasm. Take care of a migraine headache by putting an ice pack on your forehead or at the base of your skull.  · If you have nausea or vomiting, eat a light diet until your headache eases.  · If you have a migraine headache, use sunglasses when in the daylight or around bright indoor lighting until your symptoms get better. Bright glaring light can make this type of headache worse.  Follow-up care  Follow up with your healthcare provider, or " as advised. Talk with your provider if you have frequent headaches. He or she can help figure out a treatment plan. By knowing the earliest signs of headache, and starting treatment right away, you may be able to stop the pain yourself.  When to seek medical advice  Call your healthcare provider right away if any of these occur:  · Your head pain suddenly gets worse after sexual intercourse or strenuous activity  · Your head pain doesnt get better within 24 hours  · You arent able to keep liquids down (repeated vomiting)  · Fever of 100.4ºF (38ºC) or higher, or as directed by your healthcare provider  · Stiff neck  · Extreme drowsiness, confusion, or fainting  · Dizziness or dizziness with spinning sensation (vertigo)  · Weakness in an arm or leg or one side of your face  · You have trouble talking or seeing  Date Last Reviewed: 8/1/2016  © 0713-0619 [employer]. [address] [place] [address], [place] [address]. All rights reserved. This information is not intended as a substitute for professional medical care. Always follow your healthcare professional's instructions.

## 2017-04-22 NOTE — ED AVS SNAPSHOT
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 Schedule an appointment as soon as possible for a visit in 2 days.    Specialty:  Family Medicine    Why:  for recheck and referral to specialists for further treatment of your chronic headaches    Contact information:    [address] [place] [address]  [phone]         These Medications        Disp Refills Start End    hydrocodone-acetaminophen 5-325mg (NORCO) 5-325 mg per tablet 20 tablet 0 2017     Take 1 tablet by mouth every 6 (six) hours as needed for Pain. - Oral      [facility] On [facility]     [facility] On Call Nurse Care Line -  Assistance  Unless otherwise directed by your provider, please contact [facility], our nurse care line that is available for  assistance.     Registered nurses in the [facility] provide: appointment scheduling, clinical advisement, health education, and other advisory services.  Call: [phone] (toll free)               Medications           Message regarding Medications     Verify the changes and/or additions to your medication regime listed below are the same as discussed with your clinician today.  If any of these changes or additions are incorrect, please notify your healthcare  provider.        START taking these NEW medications        Refills    hydrocodone-acetaminophen 5-325mg (NORCO) 5-325 mg per tablet 0    Sig: Take 1 tablet by mouth every 6 (six) hours as needed for Pain.    Class: Print    Route: Oral      These medications were administered today        Dose Freq    lorazepam injection 1 mg 1 mg ED 1 Time    Sig: Inject 0.5 mLs (1 mg total) into the vein ED 1 Time.    Class: Normal    Route: Intravenous    ondansetron injection 4 mg 4 mg ED 1 Time    Sig: Inject 4 mg into the vein ED 1 Time.    Class: Normal    Route: Intravenous    hydrocodone-acetaminophen 10-325mg per tablet 1 tablet 1 tablet ED 1 Time    Sig: Take 1 tablet by mouth ED 1 Time.    Class: Normal    Route: Oral      STOP taking these medications     topiramate (TOPAMAX) 50 MG tablet Take 1 tablet (50 mg total) by mouth nightly.    gabapentin (NEURONTIN) 100 MG capsule Take 100 mg by mouth 3 (three) times daily.    butalbital-acetaminophen-caffeine -40 mg (FIORICET, ESGIC) -40 mg per tablet Take 1 tablet by mouth every 6 (six) hours as needed for Pain.           Verify that the below list of medications is an accurate representation of the medications you are currently taking.  If none reported, the list may be blank. If incorrect, please contact your healthcare provider. Carry this list with you in case of emergency.           Current Medications     citalopram (CELEXA) 20 MG tablet Take 20 mg by mouth 2 (two) times daily as needed.     clonazePAM (KLONOPIN) 1 MG tablet Take 1 mg by mouth 2 (two) times daily as needed for Anxiety.    levetiracetam (KEPPRA) 500 MG Tab Take 500 mg by mouth 2 (two) times daily.    hydrocodone-acetaminophen 10-325mg per tablet 1 tablet Take 1 tablet by mouth ED 1 Time.    hydrocodone-acetaminophen 5-325mg (NORCO) 5-325 mg per tablet Take 1 tablet by mouth every 6 (six) hours as needed for Pain.           Clinical Reference Information           Your Vitals Were     BP  "Pulse Temp Resp Height Weight    125/72 (BP Location: Right arm, Patient Position: Sitting) 89 97.9 °F (36.6 °C) (Oral) 18 5' 2" (1.575 m) 47.6 kg (105 lb)    Last Period SpO2 BMI          (LMP Unknown) 99% 19.2 kg/m2        Allergies as of 4/22/2017     No Known Allergies      Immunizations Administered on Date of Encounter - 4/22/2017     None      ED Micro, Lab, POCT     Start Ordered       Status Ordering Provider    04/22/17 0620 04/22/17 0619  APTT  STAT      Final result     04/22/17 0620 04/22/17 0619  Protime-INR  STAT      Final result     04/22/17 0619 04/22/17 0619  CBC auto differential  STAT      Final result     04/22/17 0619 04/22/17 0619  Comprehensive metabolic panel  STAT      Final result     04/22/17 0619 04/22/17 0619  Urinalysis  STAT      In process       ED Imaging Orders     Start Ordered       Status Ordering Provider    04/22/17 0619 04/22/17 0619  CT Head Without Contrast  1 time imaging      Final result         Discharge Instructions         Headache, Unspecified    A number of things can cause headaches. The cause of your headache isnt clear. But it doesnt seem to be a sign of any serious illness.  You could have a tension headache or a migraine headache.  Stress can cause a tension headache. This can happen if you tense the muscles of your shoulders, neck, and scalp without knowing it. If this stress lasts long enough, you may develop a tension headache.  It is not clear why migraines occur, but certain things called" triggers" can raise the risk of having a migraine attack. Migraine triggers may include emotional stress or depression, or by hormone changes during the menstrual cycle. Other triggers include birth control pills and other medicines, alcohol or caffeine, foods with tyramine (such as aged cheese, wine), eyestrain, weather changes, missed meals, and lack of sleep or oversleeping.  Other causes of headache include:  · Viral illness with high fever  · Head injury with " concussion  · Sinus, ear, or throat infection  · Dental pain and jaw joint (TMJ) pain  More serious but less common causes of headache include stroke, brain hemorrhage, brain tumor, meningitis, and encephalitis.  Home care  Follow these tips when taking care of yourself at home:  · Dont drive yourself home if you were given pain medicine for your headache. Instead, have someone else drive you home. Try to sleep when you get home. You should feel much better when you wake up.  · Apply heat to the back of your neck to ease a neck muscle spasm. Take care of a migraine headache by putting an ice pack on your forehead or at the base of your skull.  · If you have nausea or vomiting, eat a light diet until your headache eases.  · If you have a migraine headache, use sunglasses when in the daylight or around bright indoor lighting until your symptoms get better. Bright glaring light can make this type of headache worse.  Follow-up care  Follow up with your healthcare provider, or as advised. Talk with your provider if you have frequent headaches. He or she can help figure out a treatment plan. By knowing the earliest signs of headache, and starting treatment right away, you may be able to stop the pain yourself.  When to seek medical advice  Call your healthcare provider right away if any of these occur:  · Your head pain suddenly gets worse after sexual intercourse or strenuous activity  · Your head pain doesnt get better within 24 hours  · You arent able to keep liquids down (repeated vomiting)  · Fever of 100.4ºF (38ºC) or higher, or as directed by your healthcare provider  · Stiff neck  · Extreme drowsiness, confusion, or fainting  · Dizziness or dizziness with spinning sensation (vertigo)  · Weakness in an arm or leg or one side of your face  · You have trouble talking or seeing  Date Last Reviewed: 8/1/2016  © 6208-3689 [employer]. [address] [place] [address], [place] [address]. All rights reserved. This  information is not intended as a substitute for professional medical care. Always follow your healthcare professional's instructions.          Discharge References/Attachments     ANXIETY, YOUR BODY'S RESPONSE TO  (ENGLISH)    CHRONIC PAIN (ENGLISH)      [facility] Sign-Up     Activating your [facility] account is as easy as 1-2-3!     1) Visit [email or web address], select Sign Up Now, enter this activation code and your date of birth, then select Next.  Activation code not generated  Current Patient Portal Status: Account disabled      2) Create a username and password to use when you visit [facility] in the future and select a security question in case you lose your password and select Next.    3) Enter your e-mail address and click Sign Up!    Additional Information  If you have questions, please e-mail [email or web address] or call [phone] to talk to our [facility] staff. Remember, [facility] is NOT to be used for urgent needs. For medical emergencies, dial 911.          [facility] complies with applicable Federal civil rights laws and does not discriminate on the basis of race, color, national origin, age, disability, or sex.        Language Assistance Services     ATTENTION: Language assistance services are available, free of charge. Please call [phone].      ATENCIÓN: Si habla terrence, tiene a cox disposición servicios gratuitos de asistencia lingüística. Llame al [phone].     CHÚ Ý: N?u b?n nói Ti?ng Vi?t, có các d?ch v? h? tr? ngôn ng? mi?n phí dành cho b?n. G?i s? [phone].

## 2017-04-22 NOTE — ED PROVIDER NOTES
Encounter Date: 4/22/2017       History     Chief Complaint   Patient presents with    Headache     frontal headache for 1 hour with nausea; took fioricet PTA     Review of patient's allergies indicates:  No Known Allergies  HPI Comments: 36 y/o female s/p cerebral aneurysm repair on keppra for seizures here anxious with complaint headache onset this Am at approx 400 AM. Pt with no motor, sensory deficits - here anxious exhibiting occasiona jerking movements pt alert throughout episodes with no confusion or acute motor,sensory deficits    Patient is a 37 y.o. female presenting with the following complaint: headaches. The history is provided by the patient.   Headache    This is a recurrent problem. The current episode started today. The problem occurs intermittently. The problem has been unchanged. The pain is located in the bilateral region. The pain quality is similar to prior headaches. The quality of the pain is described as aching. The pain is at a severity of 6/10. Associated symptoms include nausea. Pertinent negatives include no abdominal pain, abnormal behavior, anorexia, back pain, blurred vision, coughing, dizziness, scalp tenderness, sinus pressure, sore throat, swollen glands, tingling or tinnitus. Nothing aggravates the symptoms. The treatment provided no relief.     Past Medical History:   Diagnosis Date    Anxiety     Cerebral aneurysm rupture     Depression     Stroke      Past Surgical History:   Procedure Laterality Date    BRAIN SURGERY      CHOLECYSTECTOMY      CRANIOTOMY      HERNIA REPAIR      HYSTERECTOMY       Family History   Problem Relation Age of Onset    No Known Problems Mother     Arthritis Father      Social History   Substance Use Topics    Smoking status: Current Every Day Smoker     Packs/day: 0.50     Types: Cigarettes    Smokeless tobacco: Never Used    Alcohol use Yes      Comment: occasional     Review of Systems   HENT: Negative for sinus pressure, sore throat  and tinnitus.    Eyes: Negative for blurred vision.   Respiratory: Negative for cough.    Gastrointestinal: Positive for nausea. Negative for abdominal pain and anorexia.   Musculoskeletal: Negative for back pain.   Neurological: Positive for headaches. Negative for dizziness and tingling.   Psychiatric/Behavioral: The patient is nervous/anxious.    All other systems reviewed and are negative.      Physical Exam   Initial Vitals   BP Pulse Resp Temp SpO2   04/22/17 0613 04/22/17 0613 04/22/17 0613 04/22/17 0613 04/22/17 0613   125/72 89 18 97.9 °F (36.6 °C) 99 %     Physical Exam    Nursing note and vitals reviewed.  Constitutional: She appears well-developed and well-nourished. She appears distressed.   Alert anxious female here with headache, nausea and occasional tonic clonic jerking movements    HENT:   Head: Normocephalic and atraumatic.   Eyes: Conjunctivae and EOM are normal. Pupils are equal, round, and reactive to light.   Neck: Normal range of motion. Neck supple.   Cardiovascular: Normal rate, regular rhythm, normal heart sounds and intact distal pulses.   Pulmonary/Chest: Breath sounds normal.   Abdominal: Soft. Bowel sounds are normal.   Musculoskeletal: Normal range of motion.   Neurological: She is alert and oriented to person, place, and time. She has normal strength and normal reflexes. She displays normal reflexes. No cranial nerve deficit or sensory deficit.   Skin: Skin is warm and dry.   Psychiatric: Her behavior is normal. Judgment and thought content normal.   Pt appears anxious  With tonic clonic jerking movements with no loc or confusion          ED Course   Procedures  Labs Reviewed   CBC W/ AUTO DIFFERENTIAL   COMPREHENSIVE METABOLIC PANEL   URINALYSIS   APTT   PROTIME-INR              Vitals:    04/22/17 0613   BP: 125/72   Pulse: 89   Resp: 18   Temp: 97.9 °F (36.6 °C)       Imaging Results     None          Results for orders placed or performed during the hospital encounter of 04/22/17    CBC auto differential   Result Value Ref Range    WBC 6.31 3.90 - 12.70 K/uL    RBC 4.63 4.00 - 5.40 M/uL    Hemoglobin 14.8 12.0 - 16.0 g/dL    Hematocrit 42.9 37.0 - 48.5 %    MCV 93 82 - 98 fL    MCH 32.0 (H) 27.0 - 31.0 pg    MCHC 34.5 32.0 - 36.0 %    RDW 12.2 11.5 - 14.5 %    Platelets 210 150 - 350 K/uL    MPV 10.3 9.2 - 12.9 fL    Gran # 3.9 1.8 - 7.7 K/uL    Lymph # 1.7 1.0 - 4.8 K/uL    Mono # 0.5 0.3 - 1.0 K/uL    Eos # 0.2 0.0 - 0.5 K/uL    Baso # 0.03 0.00 - 0.20 K/uL    Gran% 61.4 38.0 - 73.0 %    Lymph% 26.6 18.0 - 48.0 %    Mono% 7.8 4.0 - 15.0 %    Eosinophil% 3.5 0.0 - 8.0 %    Basophil% 0.5 0.0 - 1.9 %    Differential Method Automated                          ED Course     Clinical Impression:       ICD-10-CM ICD-9-CM   1. Chronic nonintractable headache, unspecified headache type R51 784.0   2. History of spontaneous intraparenchymal intracranial hemorrhage due to cerebral aneurysm Z86.79 V12.59         Disposition:   Disposition: Discharged  Condition: Stable       Gonzalo Miller Jr., MD  04/22/17 0846

## 2017-04-30 ENCOUNTER — HOSPITAL ENCOUNTER (EMERGENCY)
Facility: HOSPITAL | Age: 37
Discharge: HOME | End: 2017-04-30
Attending: EMERGENCY MEDICINE
Payer: MEDICAID

## 2017-04-30 VITALS
TEMPERATURE: 98 F | OXYGEN SATURATION: 98 % | HEART RATE: 75 BPM | RESPIRATION RATE: 20 BRPM | WEIGHT: 106.5 LBS | BODY MASS INDEX: 19.48 KG/M2 | SYSTOLIC BLOOD PRESSURE: 136 MMHG | DIASTOLIC BLOOD PRESSURE: 83 MMHG

## 2017-04-30 DIAGNOSIS — K05.30 PERIODONTITIS: Primary | ICD-10-CM

## 2017-04-30 DIAGNOSIS — K02.9 DENTAL CARIES: ICD-10-CM

## 2017-04-30 PROCEDURE — 99283 EMERGENCY DEPT VISIT LOW MDM: CPT

## 2017-04-30 RX ORDER — BUTALBITAL, ACETAMINOPHEN AND CAFFEINE 50; 325; 40 MG/1; MG/1; MG/1
1 TABLET ORAL EVERY 4 HOURS PRN
Qty: 30 TABLET | Refills: 0 | Status: SHIPPED | OUTPATIENT
Start: 2017-04-30 | End: 2017-05-14 | Stop reason: ALTCHOICE

## 2017-04-30 RX ORDER — HYDROCODONE BITARTRATE AND ACETAMINOPHEN 5; 325 MG/1; MG/1
1 TABLET ORAL EVERY 4 HOURS PRN
Qty: 18 TABLET | Refills: 0 | Status: SHIPPED | OUTPATIENT
Start: 2017-04-30 | End: 2017-05-08

## 2017-04-30 RX ORDER — NITROFURANTOIN 25; 75 MG/1; MG/1
100 CAPSULE ORAL
COMMUNITY
End: 2017-05-14

## 2017-04-30 RX ORDER — PENICILLIN V POTASSIUM 500 MG/1
500 TABLET, FILM COATED ORAL 4 TIMES DAILY
Qty: 40 TABLET | Refills: 0 | Status: SHIPPED | OUTPATIENT
Start: 2017-04-30 | End: 2017-05-07

## 2017-04-30 NOTE — DISCHARGE INSTRUCTIONS
"    Dental Cavity    A dental cavity is a pit or crater in the surface of a tooth. This exposes the sensitive inner layer of the tooth and causes pain. If the cavity isnt treated, it will get bigger. It may enter the pulp and cause an infection or abscess in the bone at the root end (apex) of the tooth. An infection in the tooth is a much more serious problem than a cavity. If the tooth gets infected, you will need a root canal or the entire tooth taken out (extraction).  The pain in your tooth may be made worse by eating sweets or drinking hot or cold beverages. It may spread from the tooth to your ear or the area of your jaw on the same side.  Home care  Follow these tips when caring for yourself at home:  · Avoid sweets and hot and cold foods and drinks. Your tooth may be sensitive to changes in temperature.  · If your tooth is chipped or cracked, or if there is a large open cavity, put oil of cloves directly on the tooth to relieve pain. You can buy oil of cloves at drugstores. Some pharmacies carry an over-the-counter "toothache kit." This contains a paste that you can put on the exposed tooth to make it less sensitive.  · Put a cold pack on your jaw over the sore area to help reduce pain.  · You may use over-the-counter medicine to ease pain, unless another medicine was prescribed. If you have chronic liver or kidney disease, talk with your healthcare provider before using acetaminophen or ibuprofen. Also talk with your provider if youve had a stomach ulcer or GI bleeding.  · If you have signs of an infection, you will be given an antibiotic. Take it as directed.  Follow-up care  Follow up with your dentist, or as advised. Your pain may go away with the treatment given today. But only a dentist can fully look at and treat this problem to prevent further tooth damage.  Call 911  Call 911 if any of these occur:  · Difficulty swallowing or breathing  · Weakness or fainting  · Unusual drowsiness  · Headache or " stiff neck  When to seek medical advice  Call your healthcare provider right away if any of these occur:  · Redness or swelling of the face  · Pain gets worse or spreads to your neck  · Fever of 100.5 °F (38°C) or higher, or as directed by your healthcare provider  · Pus drains from the tooth or gum  Date Last Reviewed: 10/1/2016  © 9169-7920 [employer]. [address] [place] [address], [place] [address]. All rights reserved. This information is not intended as a substitute for professional medical care. Always follow your healthcare professional's instructions.          Understanding Tooth Decay  Plaque is a sticky coating of bacteria and other substances that forms on your teeth and gums. It can cause 2 serious problems: tooth decay and gum disease. These problems damage the teeth and gums, and may even lead to tooth loss. When the mouth is well cared for, tooth decay and gum disease can be reversed in their early stages. Better yet, you can prevent these problems from starting by brushing and flossing daily and avoiding between meal snacking on foods high in sugar and starch.     Once tooth decay eats through the enamel, it spreads quickly through the softer dentin.       After tooth decay is removed, the hole is filled with a hard material.      How tooth decay develops  Tooth decay happens when bacteria in plaque make acids that eat away at the tooth. Cavities (also called caries) are holes that form in the teeth. They are most common in places that are hard to reach with a toothbrush. This includes the grooves at the tops of the back teeth, and on the sides where the teeth touch. In late stages, tooth decay can be painful. It can also lead to tooth loss.  Treating tooth decay  Tooth decay can be treated to keep it from moving farther into the tooth. This is often done by filling cavities. First, any tooth decay is removed. This protects the tooth from more damage. Then, the cavity is filled with a hard  material. This filling protects the damaged tooth and restores the tooth's surface. If the tooth is severely damaged by decay, other treatments are available.  Follow-up visits  Visit your dental team at least every 6 months for a checkup and cleaning. If youre being treated for tooth decay or gum disease, you may need more frequent visits. These visits will likely decrease as your mouth care efforts start to pay off. Keep flossing and brushing, and maintain a healthy diet. Follow any special instructions your dentist or dental hygienist gives you. And enjoy flashing your healthy smile!  Date Last Reviewed: 6/30/2015  © 3923-9140 [employer]. [address] [place] [address], [place] [address]. All rights reserved. This information is not intended as a substitute for professional medical care. Always follow your healthcare professional's instructions.        Stages of Periodontal Disease  Periodontal disease is an infection of the gums and tissues supporting the teeth. If not treated, it often gets worse. Bone damage and tooth loss can occur. Regular self-care and dental visits can help prevent or control periodontal disease.    Gingivitis  This is the mildest form of periodontal disease. The gum becomes irritated and swollen (inflamed). The space between the gum and tooth gets deeper, forming a pocket. Gums may become red and may bleed. Or, there may be no symptoms. Left untreated, it can progress to periodontitis.       Periodontitis  Infection and inflammation spread to the bone supporting the teeth. Gums may recede (shrink back) from the teeth. Pockets between the teeth can get deeper and harder to clean. Redness, swelling, and bleeding may develop or get worse. Infection begins to destroy the bone. As bone is destroyed, teeth may start to feel loose.       Advanced periodontitis  As periodontitis advances, pockets deepen even more and can fill with pus. Around the roots of the teeth, the gums may start to  swell. Bone loss continues. The teeth may feel sensitive to heat or cold and may hurt when brushed. Teeth loosen more. In some cases, teeth may need to be removed to keep the disease from spreading.  Date Last Reviewed: 7/3/2015  © 3095-5916 [employer]. [address] [place] [address], [place] [address]. All rights reserved. This information is not intended as a substitute for professional medical care. Always follow your healthcare professional's instructions.          Periodontal Disease: Nonsurgical Treatments     Scaling at and just below the gumline reaches tartar deposits that cant be removed in an ordinary dental cleaning.     Some treatments for periodontal disease do not involve surgery. The goal of these treatments is to create conditions that enable tissues in the mouth to heal. This is done by reducing plaque, infection, and other causes of periodontal disease. About 4 to 8 weeks after starting a treatment, youll have an evaluation by your dental care provider. Depending on various factors, surgery may be the next step.  Scaling and root planing  This treatment is performed by a dentist or a dental hygienist. Sometimes a special ultrasonic device is used to remove heavy deposits. Then plaque and tartar are removed (scaling), and the root surfaces are smoothed (root planing). This helps keep the area free of bacteria. It may also help ligaments that have broken down to reattach to the teeth. This reduces the depth of pockets that have formed in the gums around the teeth.  Antimicrobials  Infection can be treated with antimicrobials. They decrease bacteria. They may be in the form of antibiotics or other antibacterial agents, like chlorhexidine. Antibiotics may be taken in pill form or as a powder placed directly into the infected pocket. Chlorhexidine may be used as an oral rinse or a degradable chip that is placed in the pocket.     Root planing smoothes rough spots on the roots where bacteria  collect.   Bite correction  Bite problems, like an uneven bite, can worsen bone loss. Grinding or clenching the teeth may contribute to the problem. A splint or other ways of adjusting the bite can reduce pressure and help control the damage.  About home care  Home care is your best weapon against periodontal disease. Brushing twice a day and flossing at least once a day can help improve your gum health. This can prevent tooth loss, and may also help you avoid surgery. Its easy to make a habit of brushing and flossing -- and its not too late to start! Ask your dentist to show you the right way to brush and floss.   Date Last Reviewed: 7/3/2015  © 1910-9970 [employer]. [address] [place] [address], [place] [address]. All rights reserved. This information is not intended as a substitute for professional medical care. Always follow your healthcare professional's instructions.

## 2017-04-30 NOTE — ED AVS SNAPSHOT
[facility]  [address] [address] [address]               Isabelle Bonds   2017 12:49 PM   ED    Description:  Female : 1980   Department:  [facility]           Your Care was Coordinated By:     Provider Role From To    Renard Jansen Jr., MD Attending Provider 17 1253 --      Reason for Visit     Dental Pain           Diagnoses this Visit        Comments    Periodontitis    -  Primary     Dental caries           ED Disposition     ED Disposition Condition Comment    Discharge  Advised patient to make an appointment with dentist for examination and treatment of their dental pain, as well as routine cleaning and disease prevention.  For prevention, patient was encouraged to: perform oral hygiene daily; have regular professional  cleaning; brush teeth at least twice a day; floss daily; avoid constant sipping of sugary drinks or frequent sucking on candy and mints; and use toothpaste containing fluoride. Encouraged patient to gargle with warm salt water several times a day, contin ue to floss after eating, and complete full course of antibiotics.             To Do List           Follow-up Information     Follow up with VIVIANA Weaver. Schedule an appointment as soon as possible for a visit in 1 week.    Specialty:  Family Medicine    Contact information:    [address] [place] [address] [phone]          Follow up with [facility].    Specialty:  Emergency Medicine    Why:  As needed, If symptoms worsen    Contact information:    [address] [address] [address] [phone]        Follow up with [facility]. Schedule an appointment as soon as possible for a visit in 1 week.    Contact information:    [address] [facility] [address] [phone]         These Medications        Disp Refills Start End    hydrocodone-acetaminophen 5-325mg (NORCO) 5-325 mg per  tablet 18 tablet 0 4/30/2017     Take 1 tablet by mouth every 4 (four) hours as needed for Pain. - Oral    butalbital-acetaminophen-caffeine -40 mg (FIORICET, ESGIC) -40 mg per tablet 30 tablet 0 4/30/2017 5/30/2017    Take 1 tablet by mouth every 4 (four) hours as needed for Pain. - Oral    penicillin v potassium (VEETID) 500 MG tablet 40 tablet 0 4/30/2017 5/7/2017    Take 1 tablet (500 mg total) by mouth 4 (four) times daily. - Oral      [facility]     [facility] On Call Nurse Care Line - 24/7 Assistance  Unless otherwise directed by your provider, please contact [facility], our nurse care line that is available for 24/7 assistance.     Registered nurses in the [facility] On Call Center provide: appointment scheduling, clinical advisement, health education, and other advisory services.  Call: [phone] (toll free)               Medications           Message regarding Medications     Verify the changes and/or additions to your medication regime listed below are the same as discussed with your clinician today.  If any of these changes or additions are incorrect, please notify your healthcare provider.        START taking these NEW medications        Refills    hydrocodone-acetaminophen 5-325mg (NORCO) 5-325 mg per tablet 0    Sig: Take 1 tablet by mouth every 4 (four) hours as needed for Pain.    Class: Print    Route: Oral    butalbital-acetaminophen-caffeine -40 mg (FIORICET, ESGIC) -40 mg per tablet 0    Sig: Take 1 tablet by mouth every 4 (four) hours as needed for Pain.    Class: Print    Route: Oral    penicillin v potassium (VEETID) 500 MG tablet 0    Sig: Take 1 tablet (500 mg total) by mouth 4 (four) times daily.    Class: Print    Route: Oral           Verify that the below list of medications is an accurate representation of the medications you are currently taking.  If none reported, the list may be blank. If incorrect, please contact your healthcare provider. Carry this  list with you in case of emergency.           Current Medications     citalopram (CELEXA) 20 MG tablet Take 20 mg by mouth 2 (two) times daily as needed.     clonazePAM (KLONOPIN) 1 MG tablet Take 1 mg by mouth 2 (two) times daily as needed for Anxiety.    nitrofurantoin, macrocrystal-monohydrate, (MACROBID) 100 MG capsule Take 100 mg by mouth every 12 (twelve) hours.    butalbital-acetaminophen-caffeine -40 mg (FIORICET, ESGIC) -40 mg per tablet Take 1 tablet by mouth every 4 (four) hours as needed for Pain.    hydrocodone-acetaminophen 5-325mg (NORCO) 5-325 mg per tablet Take 1 tablet by mouth every 4 (four) hours as needed for Pain.    levetiracetam (KEPPRA) 500 MG Tab Take 500 mg by mouth 2 (two) times daily.    penicillin v potassium (VEETID) 500 MG tablet Take 1 tablet (500 mg total) by mouth 4 (four) times daily.           Clinical Reference Information           Your Vitals Were     BP Pulse Temp Resp Weight Last Period    136/83 75 97.8 °F (36.6 °C) (Oral) 20 48.3 kg (106 lb 8 oz) (LMP Unknown)    SpO2 BMI             98% 19.48 kg/m2         Allergies as of 4/30/2017     No Known Allergies      Immunizations Administered on Date of Encounter - 4/30/2017     None      ED Micro, Lab, POCT     None      ED Imaging Orders     None        Discharge Instructions           Dental Cavity    A dental cavity is a pit or crater in the surface of a tooth. This exposes the sensitive inner layer of the tooth and causes pain. If the cavity isnt treated, it will get bigger. It may enter the pulp and cause an infection or abscess in the bone at the root end (apex) of the tooth. An infection in the tooth is a much more serious problem than a cavity. If the tooth gets infected, you will need a root canal or the entire tooth taken out (extraction).  The pain in your tooth may be made worse by eating sweets or drinking hot or cold beverages. It may spread from the tooth to your ear or the area of your jaw on the  "same side.  Home care  Follow these tips when caring for yourself at home:  · Avoid sweets and hot and cold foods and drinks. Your tooth may be sensitive to changes in temperature.  · If your tooth is chipped or cracked, or if there is a large open cavity, put oil of cloves directly on the tooth to relieve pain. You can buy oil of cloves at drugstores. Some pharmacies carry an over-the-counter "toothache kit." This contains a paste that you can put on the exposed tooth to make it less sensitive.  · Put a cold pack on your jaw over the sore area to help reduce pain.  · You may use over-the-counter medicine to ease pain, unless another medicine was prescribed. If you have chronic liver or kidney disease, talk with your healthcare provider before using acetaminophen or ibuprofen. Also talk with your provider if youve had a stomach ulcer or GI bleeding.  · If you have signs of an infection, you will be given an antibiotic. Take it as directed.  Follow-up care  Follow up with your dentist, or as advised. Your pain may go away with the treatment given today. But only a dentist can fully look at and treat this problem to prevent further tooth damage.  Call 911  Call 911 if any of these occur:  · Difficulty swallowing or breathing  · Weakness or fainting  · Unusual drowsiness  · Headache or stiff neck  When to seek medical advice  Call your healthcare provider right away if any of these occur:  · Redness or swelling of the face  · Pain gets worse or spreads to your neck  · Fever of 100.5 °F (38°C) or higher, or as directed by your healthcare provider  · Pus drains from the tooth or gum  Date Last Reviewed: 10/1/2016  © 6679-8597 [employer]. [address] [place] [address], [place] [address]. All rights reserved. This information is not intended as a substitute for professional medical care. Always follow your healthcare professional's instructions.          Understanding Tooth Decay  Plaque is a sticky coating of " bacteria and other substances that forms on your teeth and gums. It can cause 2 serious problems: tooth decay and gum disease. These problems damage the teeth and gums, and may even lead to tooth loss. When the mouth is well cared for, tooth decay and gum disease can be reversed in their early stages. Better yet, you can prevent these problems from starting by brushing and flossing daily and avoiding between meal snacking on foods high in sugar and starch.     Once tooth decay eats through the enamel, it spreads quickly through the softer dentin.       After tooth decay is removed, the hole is filled with a hard material.      How tooth decay develops  Tooth decay happens when bacteria in plaque make acids that eat away at the tooth. Cavities (also called caries) are holes that form in the teeth. They are most common in places that are hard to reach with a toothbrush. This includes the grooves at the tops of the back teeth, and on the sides where the teeth touch. In late stages, tooth decay can be painful. It can also lead to tooth loss.  Treating tooth decay  Tooth decay can be treated to keep it from moving farther into the tooth. This is often done by filling cavities. First, any tooth decay is removed. This protects the tooth from more damage. Then, the cavity is filled with a hard material. This filling protects the damaged tooth and restores the tooth's surface. If the tooth is severely damaged by decay, other treatments are available.  Follow-up visits  Visit your dental team at least every 6 months for a checkup and cleaning. If youre being treated for tooth decay or gum disease, you may need more frequent visits. These visits will likely decrease as your mouth care efforts start to pay off. Keep flossing and brushing, and maintain a healthy diet. Follow any special instructions your dentist or dental hygienist gives you. And enjoy flashing your healthy smile!  Date Last Reviewed: 6/30/2015  © 0234-4737 The  [employer]. [address] [place] [address]. All rights reserved. This information is not intended as a substitute for professional medical care. Always follow your healthcare professional's instructions.        Stages of Periodontal Disease  Periodontal disease is an infection of the gums and tissues supporting the teeth. If not treated, it often gets worse. Bone damage and tooth loss can occur. Regular self-care and dental visits can help prevent or control periodontal disease.    Gingivitis  This is the mildest form of periodontal disease. The gum becomes irritated and swollen (inflamed). The space between the gum and tooth gets deeper, forming a pocket. Gums may become red and may bleed. Or, there may be no symptoms. Left untreated, it can progress to periodontitis.       Periodontitis  Infection and inflammation spread to the bone supporting the teeth. Gums may recede (shrink back) from the teeth. Pockets between the teeth can get deeper and harder to clean. Redness, swelling, and bleeding may develop or get worse. Infection begins to destroy the bone. As bone is destroyed, teeth may start to feel loose.       Advanced periodontitis  As periodontitis advances, pockets deepen even more and can fill with pus. Around the roots of the teeth, the gums may start to swell. Bone loss continues. The teeth may feel sensitive to heat or cold and may hurt when brushed. Teeth loosen more. In some cases, teeth may need to be removed to keep the disease from spreading.  Date Last Reviewed: 7/3/2015  © 9563-7970 [employer]. [address] [place] [address]. All rights reserved. This information is not intended as a substitute for professional medical care. Always follow your healthcare professional's instructions.          Periodontal Disease: Nonsurgical Treatments     Scaling at and just below the gumline reaches tartar deposits that cant be removed in an ordinary dental  cleaning.     Some treatments for periodontal disease do not involve surgery. The goal of these treatments is to create conditions that enable tissues in the mouth to heal. This is done by reducing plaque, infection, and other causes of periodontal disease. About 4 to 8 weeks after starting a treatment, youll have an evaluation by your dental care provider. Depending on various factors, surgery may be the next step.  Scaling and root planing  This treatment is performed by a dentist or a dental hygienist. Sometimes a special ultrasonic device is used to remove heavy deposits. Then plaque and tartar are removed (scaling), and the root surfaces are smoothed (root planing). This helps keep the area free of bacteria. It may also help ligaments that have broken down to reattach to the teeth. This reduces the depth of pockets that have formed in the gums around the teeth.  Antimicrobials  Infection can be treated with antimicrobials. They decrease bacteria. They may be in the form of antibiotics or other antibacterial agents, like chlorhexidine. Antibiotics may be taken in pill form or as a powder placed directly into the infected pocket. Chlorhexidine may be used as an oral rinse or a degradable chip that is placed in the pocket.     Root planing smoothes rough spots on the roots where bacteria collect.   Bite correction  Bite problems, like an uneven bite, can worsen bone loss. Grinding or clenching the teeth may contribute to the problem. A splint or other ways of adjusting the bite can reduce pressure and help control the damage.  About home care  Home care is your best weapon against periodontal disease. Brushing twice a day and flossing at least once a day can help improve your gum health. This can prevent tooth loss, and may also help you avoid surgery. Its easy to make a habit of brushing and flossing -- and its not too late to start! Ask your dentist to show you the right way to brush and floss.   Date Last  Reviewed: 7/3/2015  © 7830-0862 [employer]. [address] [place] [address], [place] [address]. All rights reserved. This information is not intended as a substitute for professional medical care. Always follow your healthcare professional's instructions.          [facility] Sign-Up     Activating your [facility] account is as easy as 1-2-3!     1) Visit [email or web address], select Sign Up Now, enter this activation code and your date of birth, then select Next.  Activation code not generated  Current Patient Portal Status: Account disabled      2) Create a username and password to use when you visit [facility] in the future and select a security question in case you lose your password and select Next.    3) Enter your e-mail address and click Sign Up!    Additional Information  If you have questions, please e-mail [email or web address] or call [phone] to talk to our [facility] staff. Remember, [facility] is NOT to be used for urgent needs. For medical emergencies, dial 911.         Smoking Cessation     If you would like to quit smoking:   You may be eligible for free services if you are a [place] resident and started smoking cigarettes before September 1, 1988.  Call the Smoking Cessation Trust (SCT) toll free at (863) 409-0796 or (461) 976-0024.   Call 9-800-QUIT-NOW if you do not meet the above criteria.   Contact us via email: [email or web address]   View our website for more information: [email or web address]         [facility] complies with applicable Federal civil rights laws and does not discriminate on the basis of race, color, national origin, age, disability, or sex.        Language Assistance Services     ATTENTION: Language assistance services are available, free of charge. Please call [phone].      ATENCIÓN: Si habla español, tiene a cox disposición servicios gratuitos de asistencia lingüística. Llame al [phone].     CHÚ Ý: N?u b?n nói Ti?ng Vi?t, có các  d?ch v? h? tr? jihan yu? mi?n phí dành cho b?n. G?i s? [phone].

## 2017-04-30 NOTE — ED PROVIDER NOTES
Encounter Date: 4/30/2017       History     Chief Complaint   Patient presents with    Dental Pain     right lower jaw pain secondary to dental problems since yest     Review of patient's allergies indicates:  No Known Allergies  Patient is a 37 y.o. female presenting with the following complaint: tooth pain. The history is provided by the patient.   Dental Pain   The primary symptoms include mouth pain (dental pain on tooth that was cracked several months ago.  pain began this morning) and dental injury (several months ago). Primary symptoms do not include oral bleeding, oral lesions, headaches, fever, shortness of breath, sore throat or angioedema. The symptoms began 3 to 5 hours ago. The symptoms are unchanged. The symptoms are new. The symptoms occur constantly.   Mouth pain began 6 - 12 hours ago. Mouth pain occurs constantly. Mouth pain is unchanged. Affected locations include: teeth and gum(s). Pain scale at highest for mouth pain: mild. Pain scale currently for mouth pain: mild.   The dental injury occurred more than 1 week ago. Affected teeth include: 27/right lower cuspid. The injury is a chip. Her tetanus status is up to date.   Additional symptoms include: dental sensitivity to temperature, gum swelling and gum tenderness. Additional symptoms do not include: purulent gums, trismus, jaw pain, facial swelling, trouble swallowing, pain with swallowing, excessive salivation, dry mouth, taste disturbance, smell disturbance, drooling, ear pain, hearing loss, nosebleeds, swollen glands, goiter and fatigue. Medical issues include: smoking and periodontal disease. Medical issues do not include: alcohol problem, chewing tobacco, immunosuppression and cancer.     Past Medical History:   Diagnosis Date    Anxiety     Cerebral aneurysm rupture     Depression     Stroke      Past Surgical History:   Procedure Laterality Date    BRAIN SURGERY      CHOLECYSTECTOMY      CRANIOTOMY      HERNIA REPAIR       HYSTERECTOMY       Family History   Problem Relation Age of Onset    No Known Problems Mother     Arthritis Father      Social History   Substance Use Topics    Smoking status: Current Every Day Smoker     Packs/day: 0.50     Types: Cigarettes    Smokeless tobacco: Never Used    Alcohol use Yes      Comment: occasional     Review of Systems   Constitutional: Negative for fatigue and fever.   HENT: Negative for drooling, ear pain, facial swelling, hearing loss, nosebleeds, sore throat and trouble swallowing.    Respiratory: Negative for shortness of breath.    Cardiovascular: Negative for chest pain.   Gastrointestinal: Negative for nausea.   Genitourinary: Negative for dysuria.   Musculoskeletal: Negative for back pain.   Skin: Negative for rash.   Neurological: Negative for weakness and headaches.   Hematological: Does not bruise/bleed easily.   All other systems reviewed and are negative.      Physical Exam   Initial Vitals   BP Pulse Resp Temp SpO2   04/30/17 1247 04/30/17 1247 04/30/17 1247 04/30/17 1247 04/30/17 1247   136/83 75 20 97.8 °F (36.6 °C) 98 %     Physical Exam    Nursing note and vitals reviewed.  Constitutional: She appears well-developed and well-nourished.   HENT:   Head: Normocephalic and atraumatic.   Right Ear: Hearing, tympanic membrane, external ear and ear canal normal.   Left Ear: Hearing, tympanic membrane, external ear and ear canal normal.   Nose: Nose normal.   Mouth/Throat: Oropharynx is clear and moist and mucous membranes are normal. She has dentures. No oral lesions. No trismus in the jaw. Abnormal dentition. Dental caries present. No dental abscesses, uvula swelling or lacerations. No oropharyngeal exudate.       Eyes: Conjunctivae and EOM are normal. Pupils are equal, round, and reactive to light.   Neck: Normal range of motion. Neck supple. No thyromegaly present.   Cardiovascular: Normal rate, regular rhythm, normal heart sounds and intact distal pulses. Exam reveals no  gallop and no friction rub.    No murmur heard.  Pulmonary/Chest: Breath sounds normal. No respiratory distress. She has no wheezes. She has no rhonchi. She exhibits no tenderness.   Abdominal: Soft. Bowel sounds are normal. She exhibits no distension. There is no tenderness. There is no rebound and no guarding.   Musculoskeletal: Normal range of motion. She exhibits no edema or tenderness.   Lymphadenopathy:     She has no cervical adenopathy.   Neurological: She is alert and oriented to person, place, and time. She has normal strength. No cranial nerve deficit or sensory deficit.   Skin: Skin is warm and dry. No rash noted.   Psychiatric: She has a normal mood and affect. Her behavior is normal. Judgment and thought content normal.         ED Course   Procedures  Labs Reviewed - No data to display       Vitals:    04/30/17 1247   BP: 136/83   Pulse: 75   Resp: 20   Temp: 97.8 °F (36.6 °C)   TempSrc: Oral   SpO2: 98%   Weight: 48.3 kg (106 lb 8 oz)           Imaging Results     None          Medications - No data to display    1:18 PM - Re-evaluation: The patient is resting comfortably and is in no acute distress. She states that her symptoms have improved after treatment within ER. Discussed test results, shared treatment plan, specific conditions for return, and importance of follow up with patient and family.  She understands and agrees with the plan as discussed. Answered  her questions at this time. She has remained hemodynamically stable throughout the ED course and is appropriate for discharge home.     Advised patient to make an appointment with dentist for examination and treatment of their dental pain, as well as routine cleaning and disease prevention.  For prevention, patient was encouraged to: perform oral hygiene daily; have regular professional cleaning; brush teeth at least twice a day; floss daily; avoid constant sipping of sugary drinks or frequent sucking on candy and mints; and use toothpaste  containing fluoride. Encouraged patient to gargle with warm salt water several times a day, continue to floss after eating, and complete full course of antibiotics.    Pre-hypertension/Hypertension: The pt has been informed that they may have pre-hypertension or hypertension based on a blood pressure reading in the ED. I recommend that the pt call the PCP listed on their discharge instructions or a physician of their choice this week to arrange f/u for further evaluation of possible pre-hypertension or hypertension.     Isabelle Bonds was given a handout which discussed their disease process, precautions, and instructions for follow-up and therapy.    Follow-up Information     Follow up with VIVIANA Weaver. Schedule an appointment as soon as possible for a visit in 1 week.    Specialty:  Family Medicine    Contact information:    [address]  [place] [address] [phone]          Follow up with [facility].    Specialty:  Emergency Medicine    Why:  As needed, If symptoms worsen    Contact information:    [address] [address] [address] [phone]        Follow up with [facility]. Schedule an appointment as soon as possible for a visit in 1 week.    Contact information:    [address] [facility] [address] [phone]            Discharge Medication List as of 4/30/2017  1:19 PM      START taking these medications    Details   butalbital-acetaminophen-caffeine -40 mg (FIORICET, ESGIC) -40 mg per tablet Take 1 tablet by mouth every 4 (four) hours as needed for Pain., Starting 4/30/2017, Until Tue 5/30/17, Print      penicillin v potassium (VEETID) 500 MG tablet Take 1 tablet (500 mg total) by mouth 4 (four) times daily., Starting 4/30/2017, Until Sun 5/7/17, Print                ED Diagnosis  1. Periodontitis    2. Dental caries                             ED Course     Clinical Impression:   The primary encounter diagnosis  was Periodontitis. A diagnosis of Dental caries was also pertinent to this visit.    Disposition:   Disposition: Discharged  Condition: Stable       Renard Jansen Jr., MD  05/01/17 2012

## 2017-05-08 ENCOUNTER — HOSPITAL ENCOUNTER (EMERGENCY)
Facility: HOSPITAL | Age: 37
Discharge: HOME | End: 2017-05-08
Attending: EMERGENCY MEDICINE
Payer: MEDICAID

## 2017-05-08 VITALS
BODY MASS INDEX: 18.78 KG/M2 | SYSTOLIC BLOOD PRESSURE: 127 MMHG | RESPIRATION RATE: 19 BRPM | TEMPERATURE: 98 F | HEART RATE: 81 BPM | OXYGEN SATURATION: 97 % | HEIGHT: 63 IN | DIASTOLIC BLOOD PRESSURE: 79 MMHG | WEIGHT: 106 LBS

## 2017-05-08 DIAGNOSIS — A59.03 TRICHOMONIASIS OF BLADDER: ICD-10-CM

## 2017-05-08 DIAGNOSIS — R51.9 HEADACHE, UNSPECIFIED HEADACHE TYPE: Primary | ICD-10-CM

## 2017-05-08 DIAGNOSIS — Z86.79 HISTORY OF SPONTANEOUS INTRAPARENCHYMAL INTRACRANIAL HEMORRHAGE: ICD-10-CM

## 2017-05-08 LAB
ALBUMIN SERPL BCP-MCNC: 4.1 G/DL
ALP SERPL-CCNC: 73 U/L
ALT SERPL W/O P-5'-P-CCNC: 29 U/L
ANION GAP SERPL CALC-SCNC: 16 MMOL/L
AST SERPL-CCNC: 18 U/L
BASOPHILS # BLD AUTO: 0.01 K/UL
BASOPHILS NFR BLD: 0.1 %
BILIRUB SERPL-MCNC: 0.2 MG/DL
BILIRUB UR QL STRIP: NEGATIVE
BUN SERPL-MCNC: 16 MG/DL
CALCIUM SERPL-MCNC: 9.1 MG/DL
CHLORIDE SERPL-SCNC: 109 MMOL/L
CLARITY UR REFRACT.AUTO: CLEAR
CO2 SERPL-SCNC: 13 MMOL/L
COLOR UR AUTO: YELLOW
CREAT SERPL-MCNC: 0.8 MG/DL
DIFFERENTIAL METHOD: ABNORMAL
EOSINOPHIL # BLD AUTO: 0.1 K/UL
EOSINOPHIL NFR BLD: 1.9 %
ERYTHROCYTE [DISTWIDTH] IN BLOOD BY AUTOMATED COUNT: 11.8 %
EST. GFR  (AFRICAN AMERICAN): >60 ML/MIN/1.73 M^2
EST. GFR  (NON AFRICAN AMERICAN): >60 ML/MIN/1.73 M^2
GLUCOSE SERPL-MCNC: 89 MG/DL
GLUCOSE UR QL STRIP: NEGATIVE
HCT VFR BLD AUTO: 38.2 %
HGB BLD-MCNC: 13.1 G/DL
HGB UR QL STRIP: NEGATIVE
KETONES UR QL STRIP: NEGATIVE
LEUKOCYTE ESTERASE UR QL STRIP: ABNORMAL
LYMPHOCYTES # BLD AUTO: 2.1 K/UL
LYMPHOCYTES NFR BLD: 30.4 %
MCH RBC QN AUTO: 31.6 PG
MCHC RBC AUTO-ENTMCNC: 34.3 %
MCV RBC AUTO: 92 FL
MICROSCOPIC COMMENT: ABNORMAL
MONOCYTES # BLD AUTO: 0.6 K/UL
MONOCYTES NFR BLD: 8.1 %
NEUTROPHILS # BLD AUTO: 4 K/UL
NEUTROPHILS NFR BLD: 59.2 %
NITRITE UR QL STRIP: NEGATIVE
PH UR STRIP: 6 [PH] (ref 5–8)
PLATELET # BLD AUTO: 188 K/UL
PMV BLD AUTO: 10.6 FL
POTASSIUM SERPL-SCNC: 4.1 MMOL/L
PROT SERPL-MCNC: 7.3 G/DL
PROT UR QL STRIP: NEGATIVE
RBC # BLD AUTO: 4.15 M/UL
RBC #/AREA URNS AUTO: 3 /HPF (ref 0–4)
SODIUM SERPL-SCNC: 138 MMOL/L
SP GR UR STRIP: 1.01 (ref 1–1.03)
SQUAMOUS #/AREA URNS AUTO: 20 /HPF
TRICHOMONAS UR QL COMP ASSIST: ABNORMAL
URN SPEC COLLECT METH UR: ABNORMAL
UROBILINOGEN UR STRIP-ACNC: NEGATIVE EU/DL
WBC # BLD AUTO: 6.82 K/UL
WBC #/AREA URNS AUTO: 15 /HPF (ref 0–5)

## 2017-05-08 PROCEDURE — 99285 EMERGENCY DEPT VISIT HI MDM: CPT | Mod: 25

## 2017-05-08 PROCEDURE — 80053 COMPREHEN METABOLIC PANEL: CPT

## 2017-05-08 PROCEDURE — 93010 ELECTROCARDIOGRAM REPORT: CPT | Mod: ,,, | Performed by: NUCLEAR MEDICINE

## 2017-05-08 PROCEDURE — 96375 TX/PRO/DX INJ NEW DRUG ADDON: CPT

## 2017-05-08 PROCEDURE — 81000 URINALYSIS NONAUTO W/SCOPE: CPT

## 2017-05-08 PROCEDURE — 96374 THER/PROPH/DIAG INJ IV PUSH: CPT

## 2017-05-08 PROCEDURE — 93005 ELECTROCARDIOGRAM TRACING: CPT

## 2017-05-08 PROCEDURE — 25000003 PHARM REV CODE 250: Performed by: EMERGENCY MEDICINE

## 2017-05-08 PROCEDURE — 63600175 PHARM REV CODE 636 W HCPCS: Performed by: EMERGENCY MEDICINE

## 2017-05-08 PROCEDURE — 85025 COMPLETE CBC W/AUTO DIFF WBC: CPT

## 2017-05-08 RX ORDER — HYDROCODONE BITARTRATE AND ACETAMINOPHEN 10; 325 MG/1; MG/1
1 TABLET ORAL
Status: COMPLETED | OUTPATIENT
Start: 2017-05-08 | End: 2017-05-08

## 2017-05-08 RX ORDER — ONDANSETRON 2 MG/ML
4 INJECTION INTRAMUSCULAR; INTRAVENOUS
Status: COMPLETED | OUTPATIENT
Start: 2017-05-08 | End: 2017-05-08

## 2017-05-08 RX ORDER — HYDROCODONE BITARTRATE AND ACETAMINOPHEN 10; 325 MG/1; MG/1
1 TABLET ORAL EVERY 4 HOURS PRN
Qty: 18 TABLET | Refills: 0 | Status: SHIPPED | OUTPATIENT
Start: 2017-05-08 | End: 2017-07-23

## 2017-05-08 RX ORDER — METRONIDAZOLE 500 MG/1
500 TABLET ORAL EVERY 12 HOURS
Qty: 14 TABLET | Refills: 0 | Status: SHIPPED | OUTPATIENT
Start: 2017-05-08 | End: 2017-05-15

## 2017-05-08 RX ORDER — LORAZEPAM 2 MG/ML
1 INJECTION INTRAMUSCULAR
Status: COMPLETED | OUTPATIENT
Start: 2017-05-08 | End: 2017-05-08

## 2017-05-08 RX ADMIN — HYDROCODONE BITARTRATE AND ACETAMINOPHEN 1 TABLET: 10; 325 TABLET ORAL at 12:05

## 2017-05-08 RX ADMIN — ONDANSETRON 4 MG: 2 INJECTION INTRAMUSCULAR; INTRAVENOUS at 11:05

## 2017-05-08 RX ADMIN — LORAZEPAM 1 MG: 2 INJECTION, SOLUTION INTRAMUSCULAR; INTRAVENOUS at 11:05

## 2017-05-08 NOTE — DISCHARGE INSTRUCTIONS
"  Self-Care for Headaches  Most headaches aren't serious and can be relieved with self-care. But some headaches may be a sign of another health problem like eye trouble or high blood pressure. To find the best treatment, learn what kind of headaches you get. For tension headaches, self-care will usually help. To treat migraines, ask your healthcare provider for advice. It is also possible to get both tension and migraine headaches. Self-care involves relieving the pain and avoiding headache triggers if you can.    Ways to reduce pain and tension  Try these steps:  · Apply a cold compress or ice pack to the pain site.  · Drink fluids. If nausea makes it hard to drink, try sucking on ice.  · Rest. Protect yourself from bright light and loud noises.  · Calm your emotions by imagining a peaceful scene.  · Massage tight neck, shoulder, and head muscles.  · To relax muscles, soak in a hot bath or use a hot shower.  Use medicines  Aspirin or aspirin substitutes, such as ibuprofen and acetaminophen, can relieve headache. Remember: Never give aspirin to anyone 18 years old or younger because of the risk of developing Reye syndrome. Use pain medicines only when necessary.  Track your headaches  Keeping a headache diary can help you and your healthcare provider identify what's causing your headaches:  · Note when each headache happens.  · Identify your activities and the foods you've eaten 6 to 8 hours before the headache began.  · Look for any trends or "triggers."  Signs of tension headache  Any of the following can be signs:  · Dull pain or feeling of pressure in a tight band around your head  · Pain in your neck or shoulders  · Headache without a definite beginning or end  · Headache after an activity such as driving or working on a computer  Signs of migraine  Any of the following can be signs:  · Throbbing pain on one or both sides of your head  · Nausea or vomiting  · Extreme sensitivity to light, sound, and " "smells  · Bright spots, flashes, or other visual changes  · Pain or nausea so severe that you can't continue your daily activities  Call your healthcare provider   If you have any of the following symptoms, contact your healthcare provider:  · A headache that lingers after a recent injury or bump to the head.  · A fever with a stiff neck or pain when you bend your head toward your chest.  · A headache along with slurred speech, changes in your vision, or numbness or weakness in your arms or legs.  · A headache for longer than 3 days.  · Frequent headaches, especially in the morning.  · Headaches with seizures   · Seek immediate medical attention if you have a headache that you would call "the worst headache you have ever had."   Date Last Reviewed: 10/4/2015  © 9454-0345 [employer]. [address] [place] [address], [place] [address]. All rights reserved. This information is not intended as a substitute for professional medical care. Always follow your healthcare professional's instructions.        "

## 2017-05-08 NOTE — ED PROVIDER NOTES
Encounter Date: 5/8/2017       History     Chief Complaint   Patient presents with    Numbness     pt c/o numbness from her lungs up and to her right arm.      Review of patient's allergies indicates:  No Known Allergies  Patient is a 37 y.o. female presenting with the following complaint: general illness. The history is provided by the patient.   General Illness    The current episode started 1 to 2 hours ago. The problem occurs frequently. The problem has been resolved. Relieved by: Slow deep breaths. Exacerbated by: anxiety. Associated symptoms include headaches. Pertinent negatives include no fever, no eye itching, no photophobia, no abdominal pain, no constipation, no diarrhea, no nausea, no vomiting, no congestion, no rhinorrhea, no sore throat, no stridor, no swollen glands, no muscle aches, no neck pain, no cough, no shortness of breath, no wheezing and no discharge. Recently, medical care has been given by the PCP.     Past Medical History:   Diagnosis Date    Anxiety     Cerebral aneurysm rupture     Depression     Stroke      Past Surgical History:   Procedure Laterality Date    BRAIN SURGERY      CHOLECYSTECTOMY      CRANIOTOMY      HERNIA REPAIR      HYSTERECTOMY       Family History   Problem Relation Age of Onset    No Known Problems Mother     Arthritis Father      Social History   Substance Use Topics    Smoking status: Current Every Day Smoker     Packs/day: 0.50     Types: Cigarettes    Smokeless tobacco: Never Used    Alcohol use Yes      Comment: occasional     Review of Systems   Constitutional: Negative for chills, diaphoresis and fever.   HENT: Negative for congestion, drooling, rhinorrhea and sore throat.    Eyes: Negative.  Negative for photophobia, discharge and itching.   Respiratory: Negative.  Negative for cough, shortness of breath, wheezing and stridor.    Cardiovascular: Negative for chest pain, palpitations and leg swelling.   Gastrointestinal: Negative for abdominal  pain, constipation, diarrhea, nausea and vomiting.   Genitourinary: Negative for difficulty urinating and dysuria.   Musculoskeletal: Negative for arthralgias, back pain, gait problem, neck pain and neck stiffness.   Skin: Negative for color change and pallor.   Neurological: Positive for headaches. Negative for dizziness, seizures, weakness and numbness.   Psychiatric/Behavioral: Negative for confusion and decreased concentration.   All other systems reviewed and are negative.      Physical Exam   Initial Vitals   BP Pulse Resp Temp SpO2   05/08/17 1123 05/08/17 1123 05/08/17 1123 05/08/17 1123 05/08/17 1123   136/82 116 22 97.8 °F (36.6 °C) 99 %     Physical Exam    Constitutional: She appears well-developed and well-nourished. Distressed: anxious.   HENT:   Head: Normocephalic and atraumatic.   Eyes: Conjunctivae are normal. Pupils are equal, round, and reactive to light.   Neck: Normal range of motion. Neck supple.   Cardiovascular: Normal rate, regular rhythm and normal heart sounds.   Pulmonary/Chest: Breath sounds normal.   Abdominal: Soft. Bowel sounds are normal. She exhibits no distension. There is no tenderness. There is no rebound.   Musculoskeletal: Normal range of motion. She exhibits no edema.   Neurological: She is alert and oriented to person, place, and time. She has normal strength. No cranial nerve deficit or sensory deficit. GCS eye subscore is 4. GCS verbal subscore is 5. GCS motor subscore is 6.   Skin: Skin is warm and dry.   Psychiatric: Her mood appears anxious.         ED Course   Procedures  Labs Reviewed   CBC W/ AUTO DIFFERENTIAL - Abnormal; Notable for the following:        Result Value    MCH 31.6 (*)     All other components within normal limits   COMPREHENSIVE METABOLIC PANEL - Abnormal; Notable for the following:     CO2 13 (*)     All other components within normal limits   URINALYSIS - Abnormal; Notable for the following:     Leukocytes, UA 1+ (*)     All other components within  "normal limits   URINALYSIS MICROSCOPIC - Abnormal; Notable for the following:     WBC, UA 15 (*)     Trichomonas, UA Many (*)     All other components within normal limits     EKG Readings: (Independently Interpreted)   Rhythm: Normal Sinus Rhythm. Heart Rate: 95. Ectopy: No Ectopy. Conduction: Normal. ST Segments: Normal ST Segments. T Waves: Normal. Clinical Impression: Normal Sinus Rhythm     ED Vital Signs:  Vitals:    05/08/17 1123 05/08/17 1209 05/08/17 1255   BP: 136/82 137/87 127/79   Pulse: (!) 116 91 81   Resp: (!) 22 19 19   Temp: 97.8 °F (36.6 °C)     TempSrc: Oral     SpO2: 99% 97% 97%   Weight: 48.1 kg (106 lb)     Height: 5' 3" (1.6 m)           Abnormal Lab Results:  Labs Reviewed   CBC W/ AUTO DIFFERENTIAL - Abnormal; Notable for the following:        Result Value    MCH 31.6 (*)     All other components within normal limits   COMPREHENSIVE METABOLIC PANEL - Abnormal; Notable for the following:     CO2 13 (*)     All other components within normal limits   URINALYSIS - Abnormal; Notable for the following:     Leukocytes, UA 1+ (*)     All other components within normal limits   URINALYSIS MICROSCOPIC - Abnormal; Notable for the following:     WBC, UA 15 (*)     Trichomonas, UA Many (*)     All other components within normal limits          All Lab Results:  Results for orders placed or performed during the hospital encounter of 05/08/17   CBC auto differential   Result Value Ref Range    WBC 6.82 3.90 - 12.70 K/uL    RBC 4.15 4.00 - 5.40 M/uL    Hemoglobin 13.1 12.0 - 16.0 g/dL    Hematocrit 38.2 37.0 - 48.5 %    MCV 92 82 - 98 fL    MCH 31.6 (H) 27.0 - 31.0 pg    MCHC 34.3 32.0 - 36.0 %    RDW 11.8 11.5 - 14.5 %    Platelets 188 150 - 350 K/uL    MPV 10.6 9.2 - 12.9 fL    Gran # 4.0 1.8 - 7.7 K/uL    Lymph # 2.1 1.0 - 4.8 K/uL    Mono # 0.6 0.3 - 1.0 K/uL    Eos # 0.1 0.0 - 0.5 K/uL    Baso # 0.01 0.00 - 0.20 K/uL    Gran% 59.2 38.0 - 73.0 %    Lymph% 30.4 18.0 - 48.0 %    Mono% 8.1 4.0 - 15.0 %    " Eosinophil% 1.9 0.0 - 8.0 %    Basophil% 0.1 0.0 - 1.9 %    Differential Method Automated    Comprehensive metabolic panel   Result Value Ref Range    Sodium 138 136 - 145 mmol/L    Potassium 4.1 3.5 - 5.1 mmol/L    Chloride 109 95 - 110 mmol/L    CO2 13 (L) 23 - 29 mmol/L    Glucose 89 70 - 110 mg/dL    BUN, Bld 16 6 - 20 mg/dL    Creatinine 0.8 0.5 - 1.4 mg/dL    Calcium 9.1 8.7 - 10.5 mg/dL    Total Protein 7.3 6.0 - 8.4 g/dL    Albumin 4.1 3.5 - 5.2 g/dL    Total Bilirubin 0.2 0.1 - 1.0 mg/dL    Alkaline Phosphatase 73 55 - 135 U/L    AST 18 10 - 40 U/L    ALT 29 10 - 44 U/L    Anion Gap 16 8 - 16 mmol/L    eGFR if African American >60.0 >60 mL/min/1.73 m^2    eGFR if non African American >60.0 >60 mL/min/1.73 m^2   Urinalysis   Result Value Ref Range    Specimen UA Urine, Clean Catch     Color, UA Yellow Yellow, Straw, Jessy    Appearance, UA Clear Clear    pH, UA 6.0 5.0 - 8.0    Specific Gravity, UA 1.010 1.005 - 1.030    Protein, UA Negative Negative    Glucose, UA Negative Negative    Ketones, UA Negative Negative    Bilirubin (UA) Negative Negative    Occult Blood UA Negative Negative    Nitrite, UA Negative Negative    Urobilinogen, UA Negative <2.0 EU/dL    Leukocytes, UA 1+ (A) Negative   Urinalysis Microscopic   Result Value Ref Range    RBC, UA 3 0 - 4 /hpf    WBC, UA 15 (H) 0 - 5 /hpf    Squam Epithel, UA 20 /hpf    Trichomonas, UA Many (A) None    Microscopic Comment SEE COMMENT            Imaging Results:  Imaging Results         CT Head Without Contrast (Final result) Result time:  05/08/17 12:05:26    Final result by Tristian Joy MD (05/08/17 12:05:26)    Impression:         No acute findings.    Stable noncontrast CT the brain.    Evidence of right craniotomy.  Right hemispheric encephalomalacia and dystrophic calcification consistent with chronic sequela of intercerebral hemorrhage.        All CT scans at this facility use dose modulation, iterative reconstruction, and/or weight based  dosing when appropriate to reduce radiation dose to as low as reasonably achievable.       Electronically signed by: MIKE CHURCH MD  Date:     05/08/17  Time:    12:05     Narrative:    CT HEAD WITHOUT CONTRAST    Date: 05/08/17 11:52:49    Clinical indication: Numbness.  Headache.  History of intercerebral hemorrhage.     Technique:  Standard noncontrast CT scan of the brain. Comparison with multiple previous studies including 04/22/2017.     Findings:  Examination reveals evidence of a right craniotomy defect.    There is a low density zone involving the right temporal lobe and adjacent parietal lobe with small areas of hyperdensity which may represent calcification.  This likely represents postsurgical and/or post-hemorrhagic right hemispheric encephalomalacia.    No significant interval change is negative.    Small pineal region cyst is noted.    The remainder the examination is unremarkable.     Right craniotomy site, unchanged.                 The Emergency Provider reviewed the vital signs and test results, which are outlined above.    ED Discussions:  1:08 PM: Reassessed pt at this time.  Pt states her condition has improved at this time. Discussed with pt all pertinent ED information and results. Discussed pt dx of headache and plan of tx. Gave pt all f/u and return to the ED instructions. All questions and concerns were addressed at this time. Pt expresses understanding of information and instructions, and is comfortable with plan to discharge. Pt is stable for discharge.                                 ED Course     Clinical Impression:       ICD-10-CM ICD-9-CM   1. Headache, unspecified headache type R51 784.0   2. History of spontaneous intraparenchymal intracranial hemorrhage Z86.79 V12.59   3. Trichomoniasis of bladder A59.8 131.09         Disposition:   Disposition: Discharged  Condition: Stable       Tavo Osorio MD  05/08/17 1310

## 2017-05-08 NOTE — ED AVS SNAPSHOT
[facility]  [address] [address]  [place] [address]               Isabelle Bonds   2017 11:20 AM   ED    Description:  Female : 1980   Department:  [facility]           Your Care was Coordinated By:     Provider Role From To    Tavo Osorio MD Attending Provider 17 1121 --      Reason for Visit     Numbness           Diagnoses this Visit        Comments    Headache, unspecified headache type    -  Primary       ED Disposition     ED Disposition Condition Comment    Discharge             To Do List           Follow-up Information     Follow up with VIVIANA Weaver.    Specialty:  Family Medicine    Contact information:    [address]  [place] [address]  [phone]         These Medications        Disp Refills Start End    hydrocodone-acetaminophen 10-325mg (NORCO)  mg Tab 18 tablet 0 2017     Take 1 tablet by mouth every 4 (four) hours as needed for Pain. - Oral      [facility]     [facility] On Call Nurse Care Line -  Assistance  Unless otherwise directed by your provider, please contact [facility], our nurse care line that is available for  assistance.     Registered nurses in the [facility] provide: appointment scheduling, clinical advisement, health education, and other advisory services.  Call: [phone] (toll free)               Medications           Message regarding Medications     Verify the changes and/or additions to your medication regime listed below are the same as discussed with your clinician today.  If any of these changes or additions are incorrect, please notify your healthcare provider.        START taking these NEW medications        Refills    hydrocodone-acetaminophen 10-325mg (NORCO)  mg Tab 0    Sig: Take 1 tablet by mouth every 4 (four) hours as needed for Pain.    Class: Print    Route: Oral      These medications were administered today        Dose  "Freq    ondansetron injection 4 mg 4 mg ED 1 Time    Sig: Inject 4 mg into the vein ED 1 Time.    Class: Normal    Route: Intravenous    lorazepam injection 1 mg 1 mg ED 1 Time    Sig: Inject 0.5 mLs (1 mg total) into the vein ED 1 Time.    Class: Normal    Route: Intravenous    hydrocodone-acetaminophen 10-325mg per tablet 1 tablet 1 tablet ED 1 Time    Sig: Take 1 tablet by mouth ED 1 Time.    Class: Normal    Route: Oral      STOP taking these medications     hydrocodone-acetaminophen 5-325mg (NORCO) 5-325 mg per tablet Take 1 tablet by mouth every 4 (four) hours as needed for Pain.           Verify that the below list of medications is an accurate representation of the medications you are currently taking.  If none reported, the list may be blank. If incorrect, please contact your healthcare provider. Carry this list with you in case of emergency.           Current Medications     butalbital-acetaminophen-caffeine -40 mg (FIORICET, ESGIC) -40 mg per tablet Take 1 tablet by mouth every 4 (four) hours as needed for Pain.    citalopram (CELEXA) 20 MG tablet Take 20 mg by mouth 2 (two) times daily as needed.     clonazePAM (KLONOPIN) 1 MG tablet Take 1 mg by mouth 2 (two) times daily as needed for Anxiety.    levetiracetam (KEPPRA) 500 MG Tab Take 500 mg by mouth 2 (two) times daily.    hydrocodone-acetaminophen 10-325mg (NORCO)  mg Tab Take 1 tablet by mouth every 4 (four) hours as needed for Pain.    hydrocodone-acetaminophen 10-325mg per tablet 1 tablet Take 1 tablet by mouth ED 1 Time.    nitrofurantoin, macrocrystal-monohydrate, (MACROBID) 100 MG capsule Take 100 mg by mouth every 12 (twelve) hours.           Clinical Reference Information           Your Vitals Were     BP Pulse Temp Resp Height Weight    137/87 91 97.8 °F (36.6 °C) (Oral) 19 5' 3" (1.6 m) 48.1 kg (106 lb)    Last Period SpO2 BMI          (LMP Unknown) 97% 18.78 kg/m2        Allergies as of 5/8/2017     No Known Allergies    "   Immunizations Administered on Date of Encounter - 5/8/2017     None      ED Micro, Lab, POCT     Start Ordered       Status Ordering Provider    05/08/17 1126 05/08/17 1125  CBC auto differential  STAT      Final result     05/08/17 1126 05/08/17 1125  Comprehensive metabolic panel  STAT      Final result     05/08/17 1126 05/08/17 1125  Urinalysis  STAT      Final result     05/08/17 1125 05/08/17 1125  Urinalysis Microscopic  Once      Final result       ED Imaging Orders     Start Ordered       Status Ordering Provider    05/08/17 1126 05/08/17 1125  CT Head Without Contrast  1 time imaging      Final result         Discharge Instructions         Self-Care for Headaches  Most headaches aren't serious and can be relieved with self-care. But some headaches may be a sign of another health problem like eye trouble or high blood pressure. To find the best treatment, learn what kind of headaches you get. For tension headaches, self-care will usually help. To treat migraines, ask your healthcare provider for advice. It is also possible to get both tension and migraine headaches. Self-care involves relieving the pain and avoiding headache triggers if you can.    Ways to reduce pain and tension  Try these steps:  · Apply a cold compress or ice pack to the pain site.  · Drink fluids. If nausea makes it hard to drink, try sucking on ice.  · Rest. Protect yourself from bright light and loud noises.  · Calm your emotions by imagining a peaceful scene.  · Massage tight neck, shoulder, and head muscles.  · To relax muscles, soak in a hot bath or use a hot shower.  Use medicines  Aspirin or aspirin substitutes, such as ibuprofen and acetaminophen, can relieve headache. Remember: Never give aspirin to anyone 18 years old or younger because of the risk of developing Reye syndrome. Use pain medicines only when necessary.  Track your headaches  Keeping a headache diary can help you and your healthcare provider identify what's  "causing your headaches:  · Note when each headache happens.  · Identify your activities and the foods you've eaten 6 to 8 hours before the headache began.  · Look for any trends or "triggers."  Signs of tension headache  Any of the following can be signs:  · Dull pain or feeling of pressure in a tight band around your head  · Pain in your neck or shoulders  · Headache without a definite beginning or end  · Headache after an activity such as driving or working on a computer  Signs of migraine  Any of the following can be signs:  · Throbbing pain on one or both sides of your head  · Nausea or vomiting  · Extreme sensitivity to light, sound, and smells  · Bright spots, flashes, or other visual changes  · Pain or nausea so severe that you can't continue your daily activities  Call your healthcare provider   If you have any of the following symptoms, contact your healthcare provider:  · A headache that lingers after a recent injury or bump to the head.  · A fever with a stiff neck or pain when you bend your head toward your chest.  · A headache along with slurred speech, changes in your vision, or numbness or weakness in your arms or legs.  · A headache for longer than 3 days.  · Frequent headaches, especially in the morning.  · Headaches with seizures   · Seek immediate medical attention if you have a headache that you would call "the worst headache you have ever had."   Date Last Reviewed: 10/4/2015  © 0978-3675 [email or web address]. [address] [place] [address]. All rights reserved. This information is not intended as a substitute for professional medical care. Always follow your healthcare professional's instructions.          [facility] Sign-Up     Activating your [facility] account is as easy as 1-2-3!     1) Visit [email or web address], select Sign Up Now, enter this activation code and your date of birth, then select Next.  Activation code not generated  Current Patient Portal Status: Account disabled  "     2) Create a username and password to use when you visit [facility] in the future and select a security question in case you lose your password and select Next.    3) Enter your e-mail address and click Sign Up!    Additional Information  If you have questions, please e-mail [email or web address] or call [phone] to talk to our [facility] staff. Remember, [facility] is NOT to be used for urgent needs. For medical emergencies, dial 911.         Smoking Cessation     If you would like to quit smoking:   You may be eligible for free services if you are a [place] resident and started smoking cigarettes before September 1, 1988.  Call the Smoking Cessation Trust ([facility]) toll free at (869) 345-8789 or (192) 161-3328.   Call 4-800-QUIT-NOW if you do not meet the above criteria.   Contact us via email: [email or web address]   View our website for more information: [email or web address]         [facility] complies with applicable Federal civil rights laws and does not discriminate on the basis of race, color, national origin, age, disability, or sex.        Language Assistance Services     ATTENTION: Language assistance services are available, free of charge. Please call [phone].      ATENCIÓN: Si habla español, tiene a cox disposición servicios gratuitos de asistencia lingüística. Llame al [phone].     CHÚ Ý: N?u b?n nói Ti?ng Vi?t, có các d?ch v? h? tr? ngôn ng? mi?n phí dành cho b?n. G?i s? [phone].

## 2017-05-14 ENCOUNTER — HOSPITAL ENCOUNTER (EMERGENCY)
Facility: HOSPITAL | Age: 37
Discharge: HOME | End: 2017-05-14
Attending: EMERGENCY MEDICINE
Payer: MEDICAID

## 2017-05-14 VITALS
HEIGHT: 62 IN | DIASTOLIC BLOOD PRESSURE: 87 MMHG | BODY MASS INDEX: 19.51 KG/M2 | HEART RATE: 97 BPM | OXYGEN SATURATION: 97 % | RESPIRATION RATE: 17 BRPM | WEIGHT: 106 LBS | SYSTOLIC BLOOD PRESSURE: 134 MMHG | TEMPERATURE: 98 F

## 2017-05-14 DIAGNOSIS — G89.29 CHRONIC NONINTRACTABLE HEADACHE, UNSPECIFIED HEADACHE TYPE: ICD-10-CM

## 2017-05-14 DIAGNOSIS — G89.4 CHRONIC PAIN SYNDROME: ICD-10-CM

## 2017-05-14 DIAGNOSIS — R51.9 CHRONIC NONINTRACTABLE HEADACHE, UNSPECIFIED HEADACHE TYPE: ICD-10-CM

## 2017-05-14 DIAGNOSIS — J01.11 ACUTE RECURRENT FRONTAL SINUSITIS: Primary | ICD-10-CM

## 2017-05-14 PROCEDURE — 99283 EMERGENCY DEPT VISIT LOW MDM: CPT

## 2017-05-14 RX ORDER — LORATADINE 10 MG/1
10 TABLET ORAL DAILY
COMMUNITY
Start: 2017-05-14 | End: 2017-07-23

## 2017-05-14 RX ORDER — BUTALBITAL, ACETAMINOPHEN AND CAFFEINE 50; 325; 40 MG/1; MG/1; MG/1
1 TABLET ORAL EVERY 4 HOURS PRN
Qty: 20 TABLET | Refills: 0 | Status: SHIPPED | OUTPATIENT
Start: 2017-05-14 | End: 2017-05-21

## 2017-05-14 RX ORDER — PREDNISONE 10 MG/1
TABLET ORAL
Qty: 24 TABLET | Refills: 0 | Status: SHIPPED | OUTPATIENT
Start: 2017-05-14 | End: 2017-07-23

## 2017-05-14 NOTE — ED AVS SNAPSHOT
"          [facility]  [address] [address] [address]               Isabelle Bonds   2017  3:11 PM   ED    Description:  Female : 1980   Department:  [facility]           Your Care was Coordinated By:     Provider Role From To    Julio Cesar Ferrer NP Nurse Practitioner [phone] --      Reason for Visit     Sinusitis           Diagnoses this Visit        Comments    Acute recurrent frontal sinusitis    -  Primary     Chronic nonintractable headache, unspecified headache type         Chronic pain syndrome           ED Disposition     ED Disposition Condition Comment    Discharge  1)   Regarding CHRONIC PAIN, be advised that the emergency department is trained to assess and treat emergencies using professional resources and utilizing our best judgment when treating pain. Patient's should have only ONE provider and ONE pharmacy hel ping manage pain when dealing with controlled substances. The U.S. Drug Enforcement Agency, [employer], and the [facility] discourages emergency medicine providers from:  prescribing pain medication to a patient that  has already received pain medicine from another healthcare provider even if a patient alleges that prescriptions were stole or lost; prescribing pain medicines such as: OxyContin, MSContin, Fentanyl, Duragesic, Methadone, Opana ER, Exalgo, Subutex, Subo xone, or Methadone; and providing injections for "flare--ups" of chronic pain. Emergency medicine providers are encouraged and have the legal right to utilize the [place] Prescription Monitoring Program to track opioid pain medications and other contro lled substance prescriptions and to notify law enforcement of any suspected abuse.    The treatment of chronic pain, as opposed to the treatment of acute pain, is beyond the scope of practice of emergency medicine and is considered a non-emergent, not li fe threatening, " condition. As per [place] law (LAC 46:XLVII.4513.D.2.b), the practitioner will not prescribe controlled substances in connection with the treatment of chronic or intractable pain defined by LAC 46:XLV.7026-6806, as pain which persists b eyond the usual course of a disease, beyond the expected time for healing from bodily trauma, or pain associated with a long-term incurable or intractable medical illness and/or disease. Furthermore, that pursuant to [place] §971, it is considered unlawful  for an individual to knowingly or intentionally acquire or obtain possession of a controlled dangerous substance by misrepresentation, fraud, forgery, deception, or subterfuge; to obtain or attempt to obtain a prescription for a controlled substance and /or legend drug by fraud, theft, misrepresentation, deception or subterfuge; or obtain or seek to obtain any controlled dangerous substance or a prescription for a controlled dangerous substance from a healthcare practitioner, while being supplied with a [place] controlled dangerous substance or a prescription for a controlled dangerous substance by another healthcare practitioner, without disclosing the fact of the existing prescription to the practitioner from whom the subsequent prescription for a controll ed dangerous substance is sought.     Please contact your primary care provider or pain management specialist for any future concerns relating to your chronic pain.       2)  RIGHT CARE - RIGHT PLACE - RIGHT TIME!  Your primary care provider is who you s farrukh call to schedule checkups and other non-urgent medical appointments. Remember that your primary care provider knows you, your medical history and what medications you are on, providing continuity of care for you and your family.  If you do not have  a primary care provider, please refer to the information below on how to obtain a primary care provider.  If it's an emergency, then you should utilize the Emergency Department  (ED). An emergency is when a condition arises that you deem severe, oftentim es a life or death situation. Good examples are heart attack symptoms, stroke or a compound fracture - a bone break that protrudes through the skin. The ED is set up with the resources needed to effectively diagnose and treat life or death situations.                   To Do List           Follow-up Information     Schedule an appointment as soon as possible for a visit with VIVIANA Weaver.    Specialty:  Family Medicine    Contact information:    [address] [place] [address] [phone]          Go to [facility].    Specialty:  Urgent Care    Why:  As needed    Contact information:    [address] [place] [address] [phone]          Schedule an appointment as soon as possible for a visit with Pain Management.    Why:  For pain management       These Medications        Disp Refills Start End    predniSONE (DELTASONE) 10 MG tablet 24 tablet 0 5/14/2017     Take 1 tab qid x 3 days, then  Take 1 tab tid x 2 days, then   Take 1 tab bid x 2 days, then  Take 1 tab daily x 2 days    butalbital-acetaminophen-caffeine -40 mg (FIORICET, ESGIC) -40 mg per tablet 20 tablet 0 5/14/2017 6/13/2017    Take 1 tablet by mouth every 4 (four) hours as needed for Headaches. - Oral      PURCHASE these Medications (No prescription required)        Start End    loratadine (CLARITIN) 10 mg tablet 5/14/2017 5/14/2018    Sig - Route: Take 1 tablet (10 mg total) by mouth once daily. - Oral    Class: OTC      [facility]     [facility] Care [facility] - 24/7 Assistance  Unless otherwise directed by your provider, please contact [facility], our nurse care line that is available for 24/7 assistance.     Registered nurses in the [facility] provide: appointment scheduling, clinical advisement, health education, and other advisory services.  Call: [phone] (toll  free)               Medications           Message regarding Medications     Verify the changes and/or additions to your medication regime listed below are the same as discussed with your clinician today.  If any of these changes or additions are incorrect, please notify your healthcare provider.        START taking these NEW medications        Refills    predniSONE (DELTASONE) 10 MG tablet 0    Sig: Take 1 tab qid x 3 days, then  Take 1 tab tid x 2 days, then   Take 1 tab bid x 2 days, then  Take 1 tab daily x 2 days    Class: Print    butalbital-acetaminophen-caffeine -40 mg (FIORICET, ESGIC) -40 mg per tablet 0    Sig: Take 1 tablet by mouth every 4 (four) hours as needed for Headaches.    Class: Print    Route: Oral    loratadine (CLARITIN) 10 mg tablet     Sig: Take 1 tablet (10 mg total) by mouth once daily.    Class: OTC    Route: Oral      STOP taking these medications     nitrofurantoin, macrocrystal-monohydrate, (MACROBID) 100 MG capsule Take 100 mg by mouth every 12 (twelve) hours.           Verify that the below list of medications is an accurate representation of the medications you are currently taking.  If none reported, the list may be blank. If incorrect, please contact your healthcare provider. Carry this list with you in case of emergency.           Current Medications     citalopram (CELEXA) 20 MG tablet Take 20 mg by mouth 2 (two) times daily as needed.     clonazePAM (KLONOPIN) 1 MG tablet Take 1 mg by mouth 2 (two) times daily as needed for Anxiety.    hydrocodone-acetaminophen 10-325mg (NORCO)  mg Tab Take 1 tablet by mouth every 4 (four) hours as needed for Pain.    levetiracetam (KEPPRA) 500 MG Tab Take 500 mg by mouth 2 (two) times daily.    metronidazole (FLAGYL) 500 MG tablet Take 1 tablet (500 mg total) by mouth every 12 (twelve) hours.    butalbital-acetaminophen-caffeine -40 mg (FIORICET, ESGIC) -40 mg per tablet Take 1 tablet by mouth every 4 (four) hours  "as needed for Headaches.    loratadine (CLARITIN) 10 mg tablet Take 1 tablet (10 mg total) by mouth once daily.    predniSONE (DELTASONE) 10 MG tablet Take 1 tab qid x 3 days, then  Take 1 tab tid x 2 days, then   Take 1 tab bid x 2 days, then  Take 1 tab daily x 2 days           Clinical Reference Information           Your Vitals Were     BP Pulse Temp Resp Height Weight    134/87 (BP Location: Left arm, Patient Position: Sitting) 97 98.4 °F (36.9 °C) (Oral) 17 5' 2" (1.575 m) 48.1 kg (106 lb)    Last Period SpO2 BMI          (LMP Unknown) 97% 19.39 kg/m2        Allergies as of 5/14/2017     No Known Allergies      Immunizations Administered on Date of Encounter - 5/14/2017     None      ED Micro, Lab, POCT     None      ED Imaging Orders     None        Discharge Instructions         Chronic Pain  Pain serves an important role. It lets you know something is wrong that needs your attention. When the body heals, pain normally goes away.  When pain lasts longer than six months, it is called chronic pain. This is pain that is present even after the body has healed. Chronic pain can cause mood problems and get in the way of your relationships and your daily life.  A number of conditions can cause chronic pain. Some of the more common include:  · Previous surgery  · An old injury  · Infection  · Diseases such as diabetes  · Nerve damage  · Back injury  · Arthritis  · Migraine or other headaches  · Fibromyalgia  · Cancer  Depression and stress can make chronic pain symptoms worse. In some cases, a cause for the pain cannot be found.   Treatment  Treatment can greatly reduce pain. In many cases, pain can become less severe, occur less often, and interfere less with your daily life. Chronic pain is often treated with a combination of medicines, therapies, and lifestyle changes. You will work closely with your healthcare provider to find a treatment plan that works best for you.  · Ask your healthcare provider for a " referral to a pain management specialty center. These can provide the most recent and proven pain management strategies, along with emotional support and comprehensive services.  · Several different types of medicines may be prescribed for chronic pain. Work with your healthcare provider to develop a medicine plan that helps manage your pain.  · Physical therapy can be very effective in helping reduce certain types of chronic pain.  · Occupational therapy teaches you how to do routine tasks of daily living in ways that lessen your discomfort.  · Psychological therapy can help you cope better with stress and pain.  · Other therapies such as meditation, yoga, biofeedback, massage, and acupuncture can also help manage chronic pain.  · Changing certain habits can help reduce chronic pain. They include:  ¨ Eating healthy  ¨ Developing an exercise routine  ¨ Getting enough sleep at night  ¨ Stopping smoking and limiting alcohol use  ¨ Losing excess weight  Follow-up care  Follow up with your healthcare provider as advised. Let your healthcare provider know if your current treatment plan is working or if changes are needed.  Resources  For more information, contact:  · [employer] [place] [employer] Society www.achenet.org  · American Chronic Pain Association www.theacpa.org [phone]        Sinus Headaches     When using nasal spray, keep your chin down and angle the spray away from center.     Sinus headaches can cause a gnawing pain behind the nose and eyes. The pain most often gets worse in the afternoon and evening. You may also run a fever. Sinus headaches are caused by colds or allergies that make the nasal passages inflamed or infected.  To help prevent sinus headaches:  · Treat colds promptly to keep mucus from backing up.  · Avoid things that trigger sinus problems, such as pollens, dust, smoke, fumes, and strong odors.  · Take allergy medicines as directed by your healthcare provider.  To relieve the  pain:  · Keep your sinuses open and free of mucus. Try over-the-counter sinus rinse products.  · Use a nasal decongestant as directed to reduce the inflammation.  · Drink fluids to keep the mucus thinner. This helps it drain more easily. You can also use a humidifier.  · Apply hot packs to the area around your sinuses. Use a hot water bottle.  · See your healthcare provider if your sinus headache lasts more than 2 weeks. You may need medicine for a sinus infection or an exam to check for other headache conditions, like migraines.        Self-Care for Headaches  Most headaches aren't serious and can be relieved with self-care. But some headaches may be a sign of another health problem like eye trouble or high blood pressure. To find the best treatment, learn what kind of headaches you get. For tension headaches, self-care will usually help. To treat migraines, ask your healthcare provider for advice. It is also possible to get both tension and migraine headaches. Self-care involves relieving the pain and avoiding headache triggers if you can.  Ways to reduce pain and tension  Try these steps:  · Apply a cold compress or ice pack to the pain site.  · Drink fluids. If nausea makes it hard to drink, try sucking on ice.  · Rest. Protect yourself from bright light and loud noises.  · Calm your emotions by imagining a peaceful scene.  · Massage tight neck, shoulder, and head muscles.  · To relax muscles, soak in a hot bath or use a hot shower.  Use medicines  Aspirin or aspirin substitutes, such as ibuprofen and acetaminophen, can relieve headache. Remember: Never give aspirin to anyone 18 years old or younger because of the risk of developing Reye syndrome. Use pain medicines only when necessary.  Track your headaches  Keeping a headache diary can help you and your healthcare provider identify what's causing your headaches:  · Note when each headache happens.  · Identify your activities and the foods you've eaten 6 to  "8 hours before the headache began.  · Look for any trends or "triggers."  Signs of tension headache  Any of the following can be signs:  · Dull pain or feeling of pressure in a tight band around your head  · Pain in your neck or shoulders  · Headache without a definite beginning or end  · Headache after an activity such as driving or working on a computer  Signs of migraine  Any of the following can be signs:  · Throbbing pain on one or both sides of your head  · Nausea or vomiting  · Extreme sensitivity to light, sound, and smells  · Bright spots, flashes, or other visual changes  · Pain or nausea so severe that you can't continue your daily activities  Call your healthcare provider   If you have any of the following symptoms, contact your healthcare provider:  · A headache that lingers after a recent injury or bump to the head.  · A fever with a stiff neck or pain when you bend your head toward your chest.  · A headache along with slurred speech, changes in your vision, or numbness or weakness in your arms or legs.  · A headache for longer than 3 days.  · Frequent headaches, especially in the morning.  · Headaches with seizures   · Seek immediate medical attention if you have a headache that you would call "the worst headache you have ever had."         Sinusitis     The sinuses are air-filled spaces within the bones of the face. They connect to the inside of the nose. Sinusitis is an inflammation of the tissue lining the sinus cavity. Sinus inflammation can occur during a cold. It can also be due to allergies to pollens and other particles in the air. It can cause symptoms such as sinus congestion, headache, sore throat, facial swelling and fullness. It may also cause a low-grade fever. No infection is present, and no antibiotic treatment is needed.  Home care  · Drink plenty of water, hot tea, and other liquids. This may help thin mucus. It also may promote sinus drainage.  · Heat may help soothe painful areas " of the face. Use a towel soaked in hot water. Or,  the shower and direct the hot spray onto your face. Using a vaporizer along with a menthol rub at night may also help.   · An expectorant containing guaifenesin may help thin the mucus and promote drainage from the sinuses.  · Over-the-counter decongestants may be used unless a similar medicine was prescribed. Nasal sprays work the fastest. Use one that contains phenylephrine or oxymetazoline. First blow the nose gently. Then use the spray. Do not use these medicines more often than directed on the label or symptoms may get worse. You may also use tablets containing pseudoephedrine. Avoid products that combine ingredients, because side effects may be increased. Read labels. You can also ask the pharmacist for help. (NOTE: Persons with high blood pressure should not use decongestants. They can raise blood pressure.)  · Over-the-counter antihistamines may help if allergies contributed to your sinusitis.    · Use acetaminophen or ibuprofen to control pain, unless another pain medicine was prescribed. (If you have chronic liver or kidney disease or ever had a stomach ulcer, talk with your doctor before using these medicines. Aspirin should never be used in anyone under 18 years of age who is ill with a fever. It may cause severe liver damage.)  · Use nasal rinses or irrigation as instructed by your health care provider.  · Don't smoke. This can worsen symptoms.  Follow-up care  Follow up with your healthcare provider or our staff if you are not improving within the next week.  When to seek medical advice  Call your healthcare provider if any of these occur:  · Green or yellow discharge from the nose or into the throat  · Facial pain or headache becoming more severe  · Stiff neck  · Unusual drowsiness or confusion  · Swelling of the forehead or eyelids  · Vision problems, including blurred or double vision  · Fever of 100.4ºF (38ºC) or higher, or as directed by  your healthcare provider  · Seizure  · Breathing problems  · Symptoms not resolving within 10 days      [facility] Sign-Up     Activating your [facility] account is as easy as 1-2-3!     1) Visit [email or web address], select Sign Up Now, enter this activation code and your date of birth, then select Next.  Activation code not generated  Current Patient Portal Status: Account disabled      2) Create a username and password to use when you visit [facility] in the future and select a security question in case you lose your password and select Next.    3) Enter your e-mail address and click Sign Up!    Additional Information  If you have questions, please e-mail [email or web address] or call [phone] to talk to our [facility] staff. Remember, [facility] is NOT to be used for urgent needs. For medical emergencies, dial 911.         Smoking Cessation     If you would like to quit smoking:   You may be eligible for free services if you are a [place] resident and started smoking cigarettes before September 1, 1988.  Call the Smoking Cessation Trust ([facility]) toll free at (166) 816-4086 or (625) 907-6455.   Call 2-800-QUIT-NOW if you do not meet the above criteria.   Contact us via email: [email or web address]   View our website for more information: [email or web address]         [facility] complies with applicable Federal civil rights laws and does not discriminate on the basis of race, color, national origin, age, disability, or sex.        Language Assistance Services     ATTENTION: Language assistance services are available, free of charge. Please call [phone].      ATENCIÓN: Si habla español, tiene a cox disposición servicios gratuitos de asistencia lingüística. Llame al [phone].     CHÚ Ý: N?u b?n nói Ti?ng Vi?t, có các d?ch v? h? tr? ngôn ng? mi?n phí dành cho b?n. G?i s? [phone].

## 2017-05-14 NOTE — ED PROVIDER NOTES
"Encounter Date: 5/14/2017       History     Chief Complaint   Patient presents with    Sinusitis     Pt states, "I have a sinus infection which is making my head pain worse, but I don't know what to take with my head stuff because I have not seen my neurologist yet."      Review of patient's allergies indicates:  No Known Allergies    Patient is a 37 y.o. female presenting with the following complaint: sinusitis and headaches. The history is provided by the patient.   Sinusitis    This is a recurrent problem. The current episode started yesterday. The problem has been gradually worsening. The pain is at a severity of 8/10. Associated symptoms include congestion and sinus pressure. Pertinent negatives include no chills, no sweats, no ear pain, no hoarse voice, no sore throat, no swollen glands, no cough and no shortness of breath. She has tried other medications (Norco) for the symptoms.   Headache    This is a chronic problem. The problem occurs constantly. The problem has been unchanged. The pain does not radiate. The pain quality is similar to prior headaches. The quality of the pain is described as pressure-like and aching. The pain is at a severity of 8/10. Associated symptoms include sinus pressure. Pertinent negatives include no abdominal pain, back pain, coughing, dizziness, ear pain, fever, loss of balance, muscle aches, nausea, neck pain, sore throat, swollen glands, vomiting or weakness. She has tried oral narcotics (Norco) for the symptoms. (PMHx: depresssion, anxiety, cerebral aneurysm rupture, chronic headaches, and a stroke.)   Ms. Bonds presents to the emergency department with complaints of sinus problems and a headache.  She states that her sinus symptoms began yesterday and now have made her "head pain worse".  She states that she has not seen a neurologist or pain management specialist since her brain surgery.  Ms. Bonds has been in the emergency department eight times (02/08/2017, 03/14/2017, " "03/31/2017, 04/09/2017, 04/22/2017, 04/30/2017, and 05/08/2013), including today, with complaints relating to her chronic headache.  The patient is also requesting a refill "of the medicine that Dr. Osorio gave" her for her headaches.  The patient denies any further complaints at this time.      PCP:   VIVIANA Weaver        Past Medical History:   Diagnosis Date    Anxiety     Cerebral aneurysm rupture     Chronic headache     Depression     Stroke      Past Surgical History:   Procedure Laterality Date    BRAIN SURGERY      CHOLECYSTECTOMY      CRANIOTOMY      HERNIA REPAIR      HYSTERECTOMY       Family History   Problem Relation Age of Onset    No Known Problems Mother     Arthritis Father      Social History   Substance Use Topics    Smoking status: Current Every Day Smoker     Packs/day: 0.50     Types: Cigarettes    Smokeless tobacco: Never Used    Alcohol use Yes      Comment: occasional       Review of Systems   Constitutional: Negative for chills and fever.   HENT: Positive for congestion and sinus pressure. Negative for ear pain, hoarse voice, postnasal drip and sore throat.    Eyes: Negative for visual disturbance.   Respiratory: Negative for cough, chest tightness and shortness of breath.    Cardiovascular: Negative for chest pain and palpitations.   Gastrointestinal: Negative for abdominal pain, diarrhea, nausea and vomiting.   Genitourinary: Negative for dysuria.   Musculoskeletal: Negative for back pain and neck pain.   Skin: Negative for rash.   Neurological: Positive for headaches. Negative for dizziness, weakness and loss of balance.   Hematological: Does not bruise/bleed easily.       Physical Exam   Initial Vitals   BP Pulse Resp Temp SpO2   05/14/17 1508 05/14/17 1508 05/14/17 1508 05/14/17 1508 05/14/17 1508   134/87 97 17 98.4 °F (36.9 °C) 97 %     Physical Exam    Nursing note and vitals reviewed.  Constitutional: Vital signs are normal. She appears well-developed " and well-nourished. She is cooperative. She does not appear ill. No distress.   HENT:   Head: Normocephalic and atraumatic.   Right Ear: Hearing, tympanic membrane, external ear and ear canal normal.   Left Ear: Hearing, tympanic membrane, external ear and ear canal normal.   Nose: Mucosal edema and sinus tenderness present.   Mouth/Throat: Uvula is midline, oropharynx is clear and moist and mucous membranes are normal.   Eyes: Conjunctivae, EOM and lids are normal. Pupils are equal, round, and reactive to light.   Neck: Trachea normal and normal range of motion. Neck supple.   Cardiovascular: Normal rate, regular rhythm, intact distal pulses and normal pulses.   Pulmonary/Chest: Effort normal and breath sounds normal. No respiratory distress. She has no wheezes. She has no rhonchi. She has no rales.   Musculoskeletal: Normal range of motion. She exhibits no edema or tenderness.   Lymphadenopathy:     She has no cervical adenopathy.   Neurological: She is alert and oriented to person, place, and time. She has normal strength. No cranial nerve deficit or sensory deficit. GCS eye subscore is 4. GCS verbal subscore is 5. GCS motor subscore is 6.   Neurovascular intact to all extremities.  Normal gait.    Skin: Skin is warm, dry and intact. No rash noted.   Psychiatric: Her speech is normal and behavior is normal. Judgment and thought content normal. Her mood appears anxious. Cognition and memory are normal.         ED Course   Procedures      1605 HOURS DISPOSITION:   Reassessment at the time of disposition demonstrates that the patient is resting comfortably in no acute distress.  She has remained hemodynamically stable throughout the entire ED visit and is without objective evidence for acute process requiring urgent intervention or hospitalization.  Patient was advised that the treatment of chronic pain, as opposed to the treatment of acute pain, was beyond my scope of practice and was considered a non-emergent, not  life threatening, condition. As per LAC 46:XLVII.4513.D.2.b, the practitioner will not prescribe controlled substances in connection with the treatment of chronic or intractable pain defined by LAC 46:XLV.3343-2493, as pain which persists beyond the usual course of a disease, beyond the expected time for healing from bodily trauma, or pain associated with a long-term incurable or intractable medical illness and/or disease.  I provided counseling to patient with regard to condition, the treatment plan, specific conditions for return, and the importance of follow up.  Answered questions at this time. The patient is stable for discharge.               Medical Decision Making:   History:   Old Records Summarized: other records.       <> Summary of Records:   Review of data obtained from the [place] Prescription Monitoring [employer] reveals that the patient has received the following medications (Controlled Substance Totals: Prescriptions:29 Prescribers:13 Pharmacies:7):  05/08/2017 HYDROCODON-ACETAMINOPHN  18  05/05/2017 QYQNDS-EASBRGNF-WSRU -40 20  04/30/2017 HYDROCODON-ACETAMINOPHEN 5-325 18  04/30/2017 TJLRLV-KKEMELBH-EROO -40 30  04/22/2017 HYDROCODON-ACETAMINOPHEN 5-325 20  04/20/2017 QTGNUU-NZHZAXAM-HDUU -40 45  04/20/2017 CLONAZEPAM 0.5 MG TABLET 60  04/06/2017 QCZMWA-ASWRTKQG-DKKW -40 60  03/31/2017 EUTQNN-LZTIVQDO-FONC -40 30  03/26/2017 HJRNEM-SJENFOBJ-SMAX -40 10  03/26/2017 CLONAZEPAM 0.5 MG TABLET 60  03/01/2017 CLONAZEPAM 1 MG TABLET 75  02/08/2017 HYDROCODON-ACETAMINOPHN  20  02/03/2017 CLONAZEPAM 1 MG TABLET 75  01/17/2017 HYDROCODON-ACETAMINOPHEN 5-325 30                           Clinical Impression:       ICD-10-CM ICD-9-CM   1. Acute recurrent frontal sinusitis J01.11 461.1   2. Chronic nonintractable headache, unspecified headache type R51 784.0   3. Chronic pain syndrome G89.4 338.4         Disposition:   Disposition: Discharged  Condition: Stable  I  discussed with patient that the evaluation in the emergency department does not suggest any emergent or life threatening medical condition requiring immediate intervention beyond what was provided in the ED, and I believe patient is safe for discharge.  Regardless, an unremarkable evaluation in the ED does not preclude the development or presence of a serious of life threatening condition. As such, patient was instructed to return immediately for any worsening or change in current symptoms. I also discussed the results of my evaluation and diagnosis with patient and she concurs with the evaluation and management plan.  Detailed written and verbal instructions provided to patient and she expressed a verbal understanding of the discharge instructions and overall management plan. Reiterated the importance of medication administration and safety and advised patient to follow up with primary care provider in 3-5 days or sooner if needed.  Also advised patient to return to the ER for any complications.     Regarding SINUSITIS, for treatment, encouraged patient to refrain from smoking, drink plenty of fluids, rest, take medications as prescribed, and to use a humidifier or steam in the bathroom. Patient instructed to notify primary care provider if: they have a cough most days or have a cough that returns frequently; are coughing up blood; have a high fever or shaking chills; have a low-grade fever for three or more days; develop thick, greenish mucus, especially if it has a bad smell; and feel short of breath or have chest pain, or return to emergency department for further evaluation. Also recommended that the patient shower in the morning and evening to wash away any allergens and help reduce the production of mucus, avoid taking decongestants if diagnosed with hypertension.  If decongestants were recommended, advised patient to not take for longer than 5 days to help prevent rebound congestion. For prevention,  "discussed with patient the importance of not smoking, getting annual influenza vaccines, reducing exposure to air pollution, and to frequently wash hands to avoid spread of infection. Instructed patient to follow up with primary care provider.    Regarding CHRONIC PAIN, patient was instructed that the emergency department is trained to assess and treat emergencies using professional resources and utilize our best judgment when treating pain. Patient was advised that patient's should have only ONE provider and ONE pharmacy helping manage pain dealing with controlled substances. Patient was instructed that it is not recommended by the Drug Enforcement Agency, [employer], and the State [facility] to prescribe pain medication to a patient that has already received pain medicine from another health care provider even if a patient alleges that prescriptions were stole or lost; prescribe pain medicines such as: OxyContin, MSContin, Fentanyl (Duragesic), Methadone, Opana ER, Exalgo, Subutex, Suboxone, or Methadone; provide injections for "flare--ups" of chronic pain; and have the right to utilize the [place] Prescription Monitoring Program to track opioid pain medications and other controlled substance prescriptions.         Discharge Medication List as of 5/14/2017  4:04 PM      START taking these medications    Details   butalbital-acetaminophen-caffeine -40 mg (FIORICET, ESGIC) -40 mg per tablet Take 1 tablet by mouth every 4 (four) hours as needed for Headaches., Starting 5/14/2017, Until Tue 6/13/17, Print      loratadine (CLARITIN) 10 mg tablet Take 1 tablet (10 mg total) by mouth once daily., Starting 5/14/2017, Until Mon 5/14/18, OTC      predniSONE (DELTASONE) 10 MG tablet Take 1 tab qid x 3 days, then  Take 1 tab tid x 2 days, then   Take 1 tab bid x 2 days, then  Take 1 tab daily x 2 days, Print               Follow-up Information     Schedule an appointment as soon " as possible for a visit with VIVIANA Weaver.    Specialty:  Family Medicine    Contact information:    [id number]  [place] [address] [phone]          Go to [facility].    Specialty:  Urgent Care    Why:  As needed    Contact information:    [address] [place] [address] [phone]          Schedule an appointment as soon as possible for a visit with Pain Management.    Why:  For pain management             Julio Cesar Ferrer NP  05/14/17 8741

## 2017-05-14 NOTE — DISCHARGE INSTRUCTIONS
Chronic Pain  Pain serves an important role. It lets you know something is wrong that needs your attention. When the body heals, pain normally goes away.  When pain lasts longer than six months, it is called chronic pain. This is pain that is present even after the body has healed. Chronic pain can cause mood problems and get in the way of your relationships and your daily life.  A number of conditions can cause chronic pain. Some of the more common include:  · Previous surgery  · An old injury  · Infection  · Diseases such as diabetes  · Nerve damage  · Back injury  · Arthritis  · Migraine or other headaches  · Fibromyalgia  · Cancer  Depression and stress can make chronic pain symptoms worse. In some cases, a cause for the pain cannot be found.   Treatment  Treatment can greatly reduce pain. In many cases, pain can become less severe, occur less often, and interfere less with your daily life. Chronic pain is often treated with a combination of medicines, therapies, and lifestyle changes. You will work closely with your healthcare provider to find a treatment plan that works best for you.  · Ask your healthcare provider for a referral to a pain management specialty center. These can provide the most recent and proven pain management strategies, along with emotional support and comprehensive services.  · Several different types of medicines may be prescribed for chronic pain. Work with your healthcare provider to develop a medicine plan that helps manage your pain.  · Physical therapy can be very effective in helping reduce certain types of chronic pain.  · Occupational therapy teaches you how to do routine tasks of daily living in ways that lessen your discomfort.  · Psychological therapy can help you cope better with stress and pain.  · Other therapies such as meditation, yoga, biofeedback, massage, and acupuncture can also help manage chronic pain.  · Changing certain habits can help reduce chronic pain. They  include:  ¨ Eating healthy  ¨ Developing an exercise routine  ¨ Getting enough sleep at night  ¨ Stopping smoking and limiting alcohol use  ¨ Losing excess weight  Follow-up care  Follow up with your healthcare provider as advised. Let your healthcare provider know if your current treatment plan is working or if changes are needed.  Resources  For more information, contact:  · [employer] [place] [employer] www.achenet.org  · American Chronic Pain Association www.theacpa.org 019-623-5686        Sinus Headaches     When using nasal spray, keep your chin down and angle the spray away from center.     Sinus headaches can cause a gnawing pain behind the nose and eyes. The pain most often gets worse in the afternoon and evening. You may also run a fever. Sinus headaches are caused by colds or allergies that make the nasal passages inflamed or infected.  To help prevent sinus headaches:  · Treat colds promptly to keep mucus from backing up.  · Avoid things that trigger sinus problems, such as pollens, dust, smoke, fumes, and strong odors.  · Take allergy medicines as directed by your healthcare provider.  To relieve the pain:  · Keep your sinuses open and free of mucus. Try over-the-counter sinus rinse products.  · Use a nasal decongestant as directed to reduce the inflammation.  · Drink fluids to keep the mucus thinner. This helps it drain more easily. You can also use a humidifier.  · Apply hot packs to the area around your sinuses. Use a hot water bottle.  · See your healthcare provider if your sinus headache lasts more than 2 weeks. You may need medicine for a sinus infection or an exam to check for other headache conditions, like migraines.        Self-Care for Headaches  Most headaches aren't serious and can be relieved with self-care. But some headaches may be a sign of another health problem like eye trouble or high blood pressure. To find the best treatment, learn what kind of headaches you get. For  "tension headaches, self-care will usually help. To treat migraines, ask your healthcare provider for advice. It is also possible to get both tension and migraine headaches. Self-care involves relieving the pain and avoiding headache triggers if you can.  Ways to reduce pain and tension  Try these steps:  · Apply a cold compress or ice pack to the pain site.  · Drink fluids. If nausea makes it hard to drink, try sucking on ice.  · Rest. Protect yourself from bright light and loud noises.  · Calm your emotions by imagining a peaceful scene.  · Massage tight neck, shoulder, and head muscles.  · To relax muscles, soak in a hot bath or use a hot shower.  Use medicines  Aspirin or aspirin substitutes, such as ibuprofen and acetaminophen, can relieve headache. Remember: Never give aspirin to anyone 18 years old or younger because of the risk of developing Reye syndrome. Use pain medicines only when necessary.  Track your headaches  Keeping a headache diary can help you and your healthcare provider identify what's causing your headaches:  · Note when each headache happens.  · Identify your activities and the foods you've eaten 6 to 8 hours before the headache began.  · Look for any trends or "triggers."  Signs of tension headache  Any of the following can be signs:  · Dull pain or feeling of pressure in a tight band around your head  · Pain in your neck or shoulders  · Headache without a definite beginning or end  · Headache after an activity such as driving or working on a computer  Signs of migraine  Any of the following can be signs:  · Throbbing pain on one or both sides of your head  · Nausea or vomiting  · Extreme sensitivity to light, sound, and smells  · Bright spots, flashes, or other visual changes  · Pain or nausea so severe that you can't continue your daily activities  Call your healthcare provider   If you have any of the following symptoms, contact your healthcare provider:  · A headache that lingers after " "a recent injury or bump to the head.  · A fever with a stiff neck or pain when you bend your head toward your chest.  · A headache along with slurred speech, changes in your vision, or numbness or weakness in your arms or legs.  · A headache for longer than 3 days.  · Frequent headaches, especially in the morning.  · Headaches with seizures   · Seek immediate medical attention if you have a headache that you would call "the worst headache you have ever had."         Sinusitis     The sinuses are air-filled spaces within the bones of the face. They connect to the inside of the nose. Sinusitis is an inflammation of the tissue lining the sinus cavity. Sinus inflammation can occur during a cold. It can also be due to allergies to pollens and other particles in the air. It can cause symptoms such as sinus congestion, headache, sore throat, facial swelling and fullness. It may also cause a low-grade fever. No infection is present, and no antibiotic treatment is needed.  Home care  · Drink plenty of water, hot tea, and other liquids. This may help thin mucus. It also may promote sinus drainage.  · Heat may help soothe painful areas of the face. Use a towel soaked in hot water. Or,  the shower and direct the hot spray onto your face. Using a vaporizer along with a menthol rub at night may also help.   · An expectorant containing guaifenesin may help thin the mucus and promote drainage from the sinuses.  · Over-the-counter decongestants may be used unless a similar medicine was prescribed. Nasal sprays work the fastest. Use one that contains phenylephrine or oxymetazoline. First blow the nose gently. Then use the spray. Do not use these medicines more often than directed on the label or symptoms may get worse. You may also use tablets containing pseudoephedrine. Avoid products that combine ingredients, because side effects may be increased. Read labels. You can also ask the pharmacist for help. (NOTE: Persons with " high blood pressure should not use decongestants. They can raise blood pressure.)  · Over-the-counter antihistamines may help if allergies contributed to your sinusitis.    · Use acetaminophen or ibuprofen to control pain, unless another pain medicine was prescribed. (If you have chronic liver or kidney disease or ever had a stomach ulcer, talk with your doctor before using these medicines. Aspirin should never be used in anyone under 18 years of age who is ill with a fever. It may cause severe liver damage.)  · Use nasal rinses or irrigation as instructed by your health care provider.  · Don't smoke. This can worsen symptoms.  Follow-up care  Follow up with your healthcare provider or our staff if you are not improving within the next week.  When to seek medical advice  Call your healthcare provider if any of these occur:  · Green or yellow discharge from the nose or into the throat  · Facial pain or headache becoming more severe  · Stiff neck  · Unusual drowsiness or confusion  · Swelling of the forehead or eyelids  · Vision problems, including blurred or double vision  · Fever of 100.4ºF (38ºC) or higher, or as directed by your healthcare provider  · Seizure  · Breathing problems  · Symptoms not resolving within 10 days

## 2017-05-21 ENCOUNTER — HOSPITAL ENCOUNTER (EMERGENCY)
Facility: HOSPITAL | Age: 37
Discharge: HOME | End: 2017-05-21
Attending: EMERGENCY MEDICINE
Payer: MEDICAID

## 2017-05-21 VITALS
HEART RATE: 82 BPM | BODY MASS INDEX: 19.39 KG/M2 | RESPIRATION RATE: 20 BRPM | OXYGEN SATURATION: 98 % | TEMPERATURE: 99 F | WEIGHT: 106 LBS | DIASTOLIC BLOOD PRESSURE: 91 MMHG | SYSTOLIC BLOOD PRESSURE: 163 MMHG

## 2017-05-21 DIAGNOSIS — R51.9 CHRONIC NONINTRACTABLE HEADACHE, UNSPECIFIED HEADACHE TYPE: Primary | ICD-10-CM

## 2017-05-21 DIAGNOSIS — G89.29 CHRONIC NONINTRACTABLE HEADACHE, UNSPECIFIED HEADACHE TYPE: Primary | ICD-10-CM

## 2017-05-21 PROCEDURE — 25000003 PHARM REV CODE 250: Performed by: EMERGENCY MEDICINE

## 2017-05-21 PROCEDURE — 99283 EMERGENCY DEPT VISIT LOW MDM: CPT

## 2017-05-21 RX ORDER — LORAZEPAM 1 MG/1
1 TABLET ORAL
Status: COMPLETED | OUTPATIENT
Start: 2017-05-21 | End: 2017-05-21

## 2017-05-21 RX ORDER — BUTALBITAL, ACETAMINOPHEN AND CAFFEINE 50; 325; 40 MG/1; MG/1; MG/1
1 TABLET ORAL
Status: COMPLETED | OUTPATIENT
Start: 2017-05-21 | End: 2017-05-21

## 2017-05-21 RX ORDER — HYDROCODONE BITARTRATE AND ACETAMINOPHEN 10; 325 MG/1; MG/1
1 TABLET ORAL
Status: DISCONTINUED | OUTPATIENT
Start: 2017-05-21 | End: 2017-05-21

## 2017-05-21 RX ORDER — BUTALBITAL, ACETAMINOPHEN AND CAFFEINE 50; 325; 40 MG/1; MG/1; MG/1
1 TABLET ORAL EVERY 4 HOURS PRN
Qty: 20 TABLET | Refills: 0 | Status: SHIPPED | OUTPATIENT
Start: 2017-05-21 | End: 2017-06-20

## 2017-05-21 RX ADMIN — BUTALBITAL, ACETAMINOPHEN, AND CAFFEINE 1 TABLET: 50; 325; 40 TABLET ORAL at 10:05

## 2017-05-21 RX ADMIN — LORAZEPAM 1 MG: 1 TABLET ORAL at 10:05

## 2017-05-21 NOTE — ED PROVIDER NOTES
"Encounter Date: 5/21/2017       History     Chief Complaint   Patient presents with    Headache     head pain and pressure, ongoing, "I ran out of pain medication cause I was on a trip"     Review of patient's allergies indicates:  No Known Allergies  Patient with a history of chronic headaches following an aneurysm bleed, presents to the ED after she ran out of her headache medications.  Patient also states that she has several family members in town, and all of the commotion is causing her a lot of anxiety.      The history is provided by the patient.   Headache    This is a chronic problem. The current episode started in the past 7 days. The problem occurs daily. The problem has been unchanged. The pain is located in the bilateral region. The pain does not radiate. The pain quality is similar to prior headaches. The quality of the pain is described as aching. Associated symptoms include photophobia. Pertinent negatives include no abnormal behavior, back pain, blurred vision, coughing, eye pain, fever, nausea, phonophobia, scalp tenderness, sinus pressure, sore throat, tingling, visual change, vomiting or weakness. The symptoms are aggravated by activity. She has tried nothing for the symptoms.     Past Medical History:   Diagnosis Date    Anxiety     Cerebral aneurysm rupture     Chronic headache     Depression     Stroke      Past Surgical History:   Procedure Laterality Date    BRAIN SURGERY      CHOLECYSTECTOMY      CRANIOTOMY      HERNIA REPAIR      HYSTERECTOMY       Family History   Problem Relation Age of Onset    No Known Problems Mother     Arthritis Father      Social History   Substance Use Topics    Smoking status: Current Every Day Smoker     Packs/day: 0.50     Types: Cigarettes    Smokeless tobacco: Never Used    Alcohol use Yes      Comment: occasional     Review of Systems   Constitutional: Negative for fever.   HENT: Negative for sinus pressure and sore throat.    Eyes: Positive " for photophobia. Negative for blurred vision and pain.   Respiratory: Negative for cough and shortness of breath.    Cardiovascular: Negative for chest pain.   Gastrointestinal: Negative for nausea and vomiting.   Genitourinary: Negative for dysuria.   Musculoskeletal: Negative for back pain.   Skin: Negative for rash.   Neurological: Positive for headaches. Negative for tingling and weakness.   Hematological: Does not bruise/bleed easily.       Physical Exam     Initial Vitals [05/21/17 0949]   BP Pulse Resp Temp SpO2   (!) 136/104 96 16 98.8 °F (37.1 °C) 96 %     Physical Exam    Nursing note and vitals reviewed.  Constitutional: She appears well-developed and well-nourished. No distress.   HENT:   Head: Normocephalic and atraumatic.   Mouth/Throat: Oropharynx is clear and moist.   Eyes: Conjunctivae and EOM are normal. Pupils are equal, round, and reactive to light.   Neck: Normal range of motion. Neck supple.   Cardiovascular: Normal rate, regular rhythm and normal heart sounds. Exam reveals no gallop and no friction rub.    No murmur heard.  Pulmonary/Chest: Breath sounds normal. No respiratory distress. She has no wheezes. She has no rhonchi. She has no rales.   Abdominal: Soft. Bowel sounds are normal. She exhibits no distension and no mass. There is no tenderness. There is no rebound and no guarding.   Musculoskeletal: Normal range of motion. She exhibits no edema or tenderness.   Neurological: She is alert and oriented to person, place, and time. She has normal strength.   Skin: Skin is warm and dry. No rash noted.   Psychiatric: Thought content normal. Her mood appears anxious.         ED Course   Procedures  Labs Reviewed - No data to display     10:05 AM: Reassessed pt at this time.  Pt states her condition has improved at this time. Discussed with pt all pertinent ED information and results. Discussed pt dx of headache and plan of tx. Gave pt all f/u and return to the ED instructions. All questions and  concerns were addressed at this time. Pt expresses understanding of information and instructions, and is comfortable with plan to discharge. Pt is stable for discharge.  Informed patient that she needs to follow up with PCP and neurology for her chronic headache.  Patient and family members verbalize understanding, and state that her PCP has not done anything, and they are having trouble getting setup with a neurologist.  Gave patient information on finding a PCP that takes her insurance.                          ED Course     Clinical Impression:       ICD-10-CM ICD-9-CM   1. Chronic nonintractable headache, unspecified headache type R51 784.0         Disposition:   Disposition: Discharged  Condition: Stable       Tavo Osorio MD  05/21/17 1006

## 2017-07-23 ENCOUNTER — HOSPITAL ENCOUNTER (EMERGENCY)
Facility: HOSPITAL | Age: 37
Discharge: HOME | End: 2017-07-23
Attending: EMERGENCY MEDICINE
Payer: MEDICAID

## 2017-07-23 VITALS
WEIGHT: 110 LBS | HEART RATE: 75 BPM | DIASTOLIC BLOOD PRESSURE: 68 MMHG | OXYGEN SATURATION: 98 % | RESPIRATION RATE: 16 BRPM | HEIGHT: 62 IN | TEMPERATURE: 99 F | BODY MASS INDEX: 20.24 KG/M2 | SYSTOLIC BLOOD PRESSURE: 110 MMHG

## 2017-07-23 DIAGNOSIS — G40.909 SEIZURE DISORDER: ICD-10-CM

## 2017-07-23 DIAGNOSIS — G89.29 CHRONIC NONINTRACTABLE HEADACHE, UNSPECIFIED HEADACHE TYPE: Primary | ICD-10-CM

## 2017-07-23 DIAGNOSIS — Z72.0 TOBACCO ABUSE: ICD-10-CM

## 2017-07-23 DIAGNOSIS — F12.10 MILD TETRAHYDROCANNABINOL (THC) ABUSE: ICD-10-CM

## 2017-07-23 DIAGNOSIS — F44.5 PSEUDOSEIZURE: ICD-10-CM

## 2017-07-23 DIAGNOSIS — R05.9 COUGH: ICD-10-CM

## 2017-07-23 DIAGNOSIS — R51.9 CHRONIC NONINTRACTABLE HEADACHE, UNSPECIFIED HEADACHE TYPE: Primary | ICD-10-CM

## 2017-07-23 LAB
ALBUMIN SERPL BCP-MCNC: 4.2 G/DL
ALP SERPL-CCNC: 72 U/L
ALT SERPL W/O P-5'-P-CCNC: 52 U/L
AMPHET+METHAMPHET UR QL: NEGATIVE
ANION GAP SERPL CALC-SCNC: 17 MMOL/L
AST SERPL-CCNC: 25 U/L
BARBITURATES UR QL SCN>200 NG/ML: NORMAL
BASOPHILS # BLD AUTO: 0.02 K/UL
BASOPHILS NFR BLD: 0.3 %
BENZODIAZ UR QL SCN>200 NG/ML: NORMAL
BILIRUB SERPL-MCNC: 0.7 MG/DL
BILIRUB UR QL STRIP: NEGATIVE
BUN SERPL-MCNC: 13 MG/DL
BZE UR QL SCN: NEGATIVE
CALCIUM SERPL-MCNC: 9 MG/DL
CANNABINOIDS UR QL SCN: NORMAL
CHLORIDE SERPL-SCNC: 109 MMOL/L
CLARITY UR REFRACT.AUTO: CLEAR
CO2 SERPL-SCNC: 14 MMOL/L
COLOR UR AUTO: YELLOW
CREAT SERPL-MCNC: 0.7 MG/DL
CREAT UR-MCNC: 229.2 MG/DL
DIFFERENTIAL METHOD: ABNORMAL
EOSINOPHIL # BLD AUTO: 0.1 K/UL
EOSINOPHIL NFR BLD: 0.8 %
ERYTHROCYTE [DISTWIDTH] IN BLOOD BY AUTOMATED COUNT: 12.8 %
EST. GFR  (AFRICAN AMERICAN): >60 ML/MIN/1.73 M^2
EST. GFR  (NON AFRICAN AMERICAN): >60 ML/MIN/1.73 M^2
GLUCOSE SERPL-MCNC: 103 MG/DL
GLUCOSE UR QL STRIP: NEGATIVE
HCT VFR BLD AUTO: 40.7 %
HGB BLD-MCNC: 14 G/DL
HGB UR QL STRIP: NEGATIVE
KETONES UR QL STRIP: NEGATIVE
LEUKOCYTE ESTERASE UR QL STRIP: NEGATIVE
LYMPHOCYTES # BLD AUTO: 1.2 K/UL
LYMPHOCYTES NFR BLD: 20 %
MAGNESIUM SERPL-MCNC: 1.8 MG/DL
MCH RBC QN AUTO: 32.1 PG
MCHC RBC AUTO-ENTMCNC: 34.4 G/DL
MCV RBC AUTO: 93 FL
METHADONE UR QL SCN>300 NG/ML: NEGATIVE
MONOCYTES # BLD AUTO: 0.6 K/UL
MONOCYTES NFR BLD: 9.6 %
NEUTROPHILS # BLD AUTO: 4.2 K/UL
NEUTROPHILS NFR BLD: 69 %
NITRITE UR QL STRIP: NEGATIVE
OPIATES UR QL SCN: NEGATIVE
PCP UR QL SCN>25 NG/ML: NEGATIVE
PH UR STRIP: 6 [PH] (ref 5–8)
PLATELET # BLD AUTO: 214 K/UL
PMV BLD AUTO: 10.4 FL
POTASSIUM SERPL-SCNC: 3.6 MMOL/L
PROLACTIN SERPL IA-MCNC: 8.7 NG/ML
PROT SERPL-MCNC: 7.4 G/DL
PROT UR QL STRIP: ABNORMAL
RBC # BLD AUTO: 4.36 M/UL
SODIUM SERPL-SCNC: 140 MMOL/L
SP GR UR STRIP: >=1.03 (ref 1–1.03)
TOXICOLOGY INFORMATION: NORMAL
URN SPEC COLLECT METH UR: ABNORMAL
UROBILINOGEN UR STRIP-ACNC: <2 EU/DL
WBC # BLD AUTO: 6.04 K/UL

## 2017-07-23 PROCEDURE — 80177 DRUG SCRN QUAN LEVETIRACETAM: CPT

## 2017-07-23 PROCEDURE — 63600175 PHARM REV CODE 636 W HCPCS: Performed by: EMERGENCY MEDICINE

## 2017-07-23 PROCEDURE — 96374 THER/PROPH/DIAG INJ IV PUSH: CPT

## 2017-07-23 PROCEDURE — 25000003 PHARM REV CODE 250: Performed by: EMERGENCY MEDICINE

## 2017-07-23 PROCEDURE — 85025 COMPLETE CBC W/AUTO DIFF WBC: CPT

## 2017-07-23 PROCEDURE — 93010 ELECTROCARDIOGRAM REPORT: CPT | Mod: ,,, | Performed by: NUCLEAR MEDICINE

## 2017-07-23 PROCEDURE — 83735 ASSAY OF MAGNESIUM: CPT

## 2017-07-23 PROCEDURE — 81003 URINALYSIS AUTO W/O SCOPE: CPT

## 2017-07-23 PROCEDURE — 80053 COMPREHEN METABOLIC PANEL: CPT

## 2017-07-23 PROCEDURE — 96361 HYDRATE IV INFUSION ADD-ON: CPT

## 2017-07-23 PROCEDURE — 96375 TX/PRO/DX INJ NEW DRUG ADDON: CPT

## 2017-07-23 PROCEDURE — 80307 DRUG TEST PRSMV CHEM ANLYZR: CPT

## 2017-07-23 PROCEDURE — 93005 ELECTROCARDIOGRAM TRACING: CPT

## 2017-07-23 PROCEDURE — 99900035 HC TECH TIME PER 15 MIN (STAT)

## 2017-07-23 PROCEDURE — 99285 EMERGENCY DEPT VISIT HI MDM: CPT | Mod: 25

## 2017-07-23 PROCEDURE — 84146 ASSAY OF PROLACTIN: CPT

## 2017-07-23 PROCEDURE — P9612 CATHETERIZE FOR URINE SPEC: HCPCS

## 2017-07-23 RX ORDER — ONDANSETRON 2 MG/ML
4 INJECTION INTRAMUSCULAR; INTRAVENOUS ONCE
Status: COMPLETED | OUTPATIENT
Start: 2017-07-23 | End: 2017-07-23

## 2017-07-23 RX ORDER — HYDROCODONE BITARTRATE AND ACETAMINOPHEN 5; 325 MG/1; MG/1
1 TABLET ORAL
Status: COMPLETED | OUTPATIENT
Start: 2017-07-23 | End: 2017-07-23

## 2017-07-23 RX ORDER — GABAPENTIN 300 MG/1
600 CAPSULE ORAL 3 TIMES DAILY
COMMUNITY
End: 2023-10-19 | Stop reason: SDUPTHER

## 2017-07-23 RX ORDER — CLONAZEPAM 1 MG/1
0.5 TABLET ORAL 2 TIMES DAILY PRN
Qty: 10 TABLET | Refills: 0 | Status: SHIPPED | OUTPATIENT
Start: 2017-07-23 | End: 2018-12-01

## 2017-07-23 RX ORDER — PAROXETINE 10 MG/1
30 TABLET, FILM COATED ORAL DAILY
COMMUNITY
End: 2018-12-10 | Stop reason: DRUGHIGH

## 2017-07-23 RX ORDER — LORAZEPAM 2 MG/ML
1 INJECTION INTRAMUSCULAR
Status: COMPLETED | OUTPATIENT
Start: 2017-07-23 | End: 2017-07-23

## 2017-07-23 RX ADMIN — HYDROCODONE BITARTRATE AND ACETAMINOPHEN 1 TABLET: 5; 325 TABLET ORAL at 11:07

## 2017-07-23 RX ADMIN — LORAZEPAM 1 MG: 2 INJECTION, SOLUTION INTRAMUSCULAR; INTRAVENOUS at 10:07

## 2017-07-23 RX ADMIN — SODIUM CHLORIDE 1000 ML: 0.9 INJECTION, SOLUTION INTRAVENOUS at 10:07

## 2017-07-23 RX ADMIN — ONDANSETRON 4 MG: 2 INJECTION INTRAMUSCULAR; INTRAVENOUS at 10:07

## 2017-07-23 NOTE — ED PROVIDER NOTES
Encounter Date: 7/23/2017       History     Chief Complaint   Patient presents with    Seizures     in and out of seizure like activity since about 8:45     CHIEF COMPLIANT: Seizures (in and out of seizure like activity since about 8:45)      7/23/2017, 9:36 Am    The history is provided by the patient, EMS, sister and boyfriend. Isabelle Bonds is a 37 y.o. female presenting to the ED for seizure activity.  Patient has a history of chronic headaches, cerebral aneurysm repair, depression, and anxiety.  Patient started having shaking activity that started at approximately 845 this morning.  The reports that she's been having 5 or 6 of these spells.  According to EMS, patient is not postictal.  The spells are lasting 2-3 minutes.  They're characterized by generalized shaking.  Mix it better, nothing makes it worse.  There is no loss of consciousness, bowel or bladder incontinence, or tongue biting.  Prior treatment includes 5 mg of Versed.  According to the patient she has a history of seizure disorder.  She normally is on Keppra 500 mg twice a day as well as Neurontin 100 mg 3 times a day.  She notes she is on clonazepam 0.5 mg for anxiety.  She reports that she is almost out.  She's had to stretch her medication for the last couple of days.  Patient is supposed to see a neurologist on October 1.  Patient notes that she has a right-sided throbbing headache.  It is 8 out of 10.  It was gradual in onset.  It was not sudden.  It is not similar to when she had her subarachnoid hemorrhage.  Patient denies any temperature, fever, nausea, vomiting, numbness or weakness to one side, dysuria, urgency, or frequency.  She notes that she has occasional cough.  Patient was seen at a local hospital 2 days ago had a reportedly negative head CT.    PCP: VIVIANA Weaver  Specialist:             Review of patient's allergies indicates:  No Known Allergies  Past Medical History:   Diagnosis Date    Anxiety     Cerebral  "aneurysm rupture     Chronic headache     Depression     Stroke      Past Surgical History:   Procedure Laterality Date    BRAIN SURGERY      CHOLECYSTECTOMY      CRANIOTOMY      HERNIA REPAIR      HYSTERECTOMY       Family History   Problem Relation Age of Onset    No Known Problems Mother     Arthritis Father      Social History   Substance Use Topics    Smoking status: Current Every Day Smoker     Packs/day: 0.50     Types: Cigarettes    Smokeless tobacco: Never Used    Alcohol use Yes      Comment: occasional     Review of Systems   Constitutional: Negative for fever.   HENT: Negative for sore throat.    Respiratory: Positive for cough. Negative for shortness of breath.    Cardiovascular: Negative for chest pain.   Gastrointestinal: Negative for nausea.   Genitourinary: Negative for dysuria.   Musculoskeletal: Negative for back pain.   Skin: Negative for rash.   Neurological: Positive for seizures and headaches. Negative for tremors, syncope, speech difficulty, weakness, light-headedness and numbness.   Hematological: Does not bruise/bleed easily.   Psychiatric/Behavioral: Positive for agitation. The patient is nervous/anxious.        Physical Exam     Initial Vitals [07/23/17 0952]   BP Pulse Resp Temp SpO2   (!) 117/59 (!) 124 17 99 °F (37.2 °C) 97 %      MAP       78.33         Vitals:    07/23/17 0952 07/23/17 1017 07/23/17 1032 07/23/17 1047   BP: (!) 117/59 115/79 111/74 119/76   Pulse: (!) 124 88 78 73   Resp: 17 14 19 (!) 21   Temp: 99 °F (37.2 °C)      TempSrc: Oral      SpO2: 97% 96% 99% 100%   Weight: 49.9 kg (110 lb)      Height: 5' 2" (1.575 m)       07/23/17 1117   BP: 111/74   Pulse: 71   Resp: 20   Temp:    TempSrc:    SpO2: 99%   Weight:    Height:          Physical Exam    Nursing note and vitals reviewed.  Constitutional: She appears well-developed and well-nourished.   Patient is thin appearing.  She is having shaking episode while [place].  " She is making eye contact with me and talking.  There is no incontinence of bowel or bladder.   HENT:   Head: Normocephalic and atraumatic.   Right Ear: External ear normal.   Left Ear: External ear normal.   Mouth/Throat: Oropharynx is clear and moist.   No tongue contusion.   Eyes: Conjunctivae and EOM are normal. Pupils are equal, round, and reactive to light.   Neck: Normal range of motion. No thyromegaly present. No tracheal deviation present.   Cardiovascular: Normal rate, regular rhythm and normal heart sounds.   Pulmonary/Chest: Breath sounds normal. No respiratory distress. She has no wheezes. She has no rhonchi. She has no rales.   Abdominal: Soft. Bowel sounds are normal. She exhibits no mass. There is no rebound and no guarding.   Musculoskeletal: Normal range of motion.   Lymphadenopathy:     She has no cervical adenopathy.   Neurological: She is alert and oriented to person, place, and time. She has normal strength. No cranial nerve deficit.   Cranial nerves II-XII intact skull, scale 15.  Finger to nose intact.  Negative pronator drift.  Negative heel drop.  Speech is clear, patient is oriented.   Skin: Skin is warm and dry. Capillary refill takes less than 2 seconds.   Psychiatric: She has a normal mood and affect. Her speech is normal and behavior is normal. Thought content normal.         ED Course   Procedures  Labs Reviewed   CBC W/ AUTO DIFFERENTIAL - Abnormal; Notable for the following:        Result Value    MCH 32.1 (*)     All other components within normal limits   URINALYSIS - Abnormal; Notable for the following:     Specific Gravity, UA >=1.030 (*)     Protein, UA Trace (*)     All other components within normal limits   COMPREHENSIVE METABOLIC PANEL - Abnormal; Notable for the following:     CO2 14 (*)     ALT 52 (*)     Anion Gap 17 (*)     All other components within normal limits   DRUG SCREEN PANEL, URINE EMERGENCY   MAGNESIUM   LEVETIRACETAM  (KEPPRA) LEVEL   PROLACTIN     EKG  Readings: (Independently Interpreted)   EKG:  Rate 93.  Sinus rhythm.  Normal axis.  No acute ST or T-wave changes noted.     Results for orders placed or performed during the hospital encounter of 07/23/17   CBC auto differential   Result Value Ref Range    WBC 6.04 3.90 - 12.70 K/uL    RBC 4.36 4.00 - 5.40 M/uL    Hemoglobin 14.0 12.0 - 16.0 g/dL    Hematocrit 40.7 37.0 - 48.5 %    MCV 93 82 - 98 fL    MCH 32.1 (H) 27.0 - 31.0 pg    MCHC 34.4 32.0 - 36.0 g/dL    RDW 12.8 11.5 - 14.5 %    Platelets 214 150 - 350 K/uL    MPV 10.4 9.2 - 12.9 fL    Gran # 4.2 1.8 - 7.7 K/uL    Lymph # 1.2 1.0 - 4.8 K/uL    Mono # 0.6 0.3 - 1.0 K/uL    Eos # 0.1 0.0 - 0.5 K/uL    Baso # 0.02 0.00 - 0.20 K/uL    Gran% 69.0 38.0 - 73.0 %    Lymph% 20.0 18.0 - 48.0 %    Mono% 9.6 4.0 - 15.0 %    Eosinophil% 0.8 0.0 - 8.0 %    Basophil% 0.3 0.0 - 1.9 %    Differential Method Automated    Urinalysis   Result Value Ref Range    Specimen UA Urine, Catheterized     Color, UA Yellow Yellow, Straw, Jessy    Appearance, UA Clear Clear    pH, UA 6.0 5.0 - 8.0    Specific Gravity, UA >=1.030 (A) 1.005 - 1.030    Protein, UA Trace (A) Negative    Glucose, UA Negative Negative    Ketones, UA Negative Negative    Bilirubin (UA) Negative Negative    Occult Blood UA Negative Negative    Nitrite, UA Negative Negative    Urobilinogen, UA <2.0 <2.0 EU/dL    Leukocytes, UA Negative Negative   Comprehensive metabolic panel   Result Value Ref Range    Sodium 140 136 - 145 mmol/L    Potassium 3.6 3.5 - 5.1 mmol/L    Chloride 109 95 - 110 mmol/L    CO2 14 (L) 23 - 29 mmol/L    Glucose 103 70 - 110 mg/dL    BUN, Bld 13 6 - 20 mg/dL    Creatinine 0.7 0.5 - 1.4 mg/dL    Calcium 9.0 8.7 - 10.5 mg/dL    Total Protein 7.4 6.0 - 8.4 g/dL    Albumin 4.2 3.5 - 5.2 g/dL    Total Bilirubin 0.7 0.1 - 1.0 mg/dL    Alkaline Phosphatase 72 55 - 135 U/L    AST 25 10 - 40 U/L    ALT 52 (H) 10 - 44 U/L    Anion Gap 17 (H) 8 - 16 mmol/L    eGFR if African American >60.0 >60  mL/min/1.73 m^2    eGFR if non African American >60.0 >60 mL/min/1.73 m^2   Drug screen panel, emergency   Result Value Ref Range    Benzodiazepines Presumptve Positive     Methadone metabolites Negative     Cocaine (Metab.) Negative     Opiate Scrn, Ur Negative     Barbiturate Screen, Ur Presumptive Positive     Amphetamine Screen, Ur Negative     THC Presumptive Positive     Phencyclidine Negative     Creatinine, Random Ur 229.2 15.0 - 325.0 mg/dL    Toxicology Information SEE COMMENT    Magnesium   Result Value Ref Range    Magnesium 1.8 1.6 - 2.6 mg/dL            Medical Decision Making:   History:   I obtained history from: another health care provider.       <> Summary of History: Patient was seen at [facility] for similar symptoms.  Had Ct of Head  EXAM: CT of the head without contrast, 7/21/2017 8:52 PM    COMPARISON: Outside head CT 3/14/2017    HISTORY: hx of aneurysm, seizures     TECHNIQUE: Helically acquired axial CT images were obtained from the skull base through the vertex without IV contrast.    Automated exposure control was used for dose reduction.    FINDINGS:  There is no acute intracranial hemorrhage. Patient is status post right pterional craniotomy. There is underlying encephalomalacia and gliosis as well as cortical calcifications at the site of prior hemorrhage. Ventricles are normal in size and configuration. No abnormal extra-axial collections.    The paranasal sinuses and mastoid air cells are clear.    IMPRESSION:  1. No acute intracranial abnormality seen.  2. Encephalomalacia and gliosis in the right parietal and temporal lobes related to previous hemorrhage.                     ED Course   Reviewed :  Last rx for Klonazepam  0.5 mg on 7/21/2017 for #10    Medications   sodium chloride 0.9% bolus 1,000 mL (0 mLs Intravenous Stopped 7/23/17 1100)   lorazepam injection 1 mg (1 mg Intravenous Given 7/23/17 1015)   ondansetron injection 4 mg (4 mg Intravenous Given 7/23/17 1014)    hydrocodone-acetaminophen 5-325mg per tablet 1 tablet (1 tablet Oral Given 7/23/17 1111)       12:43 PM Reassessment: Dr. Blevins reassessed the pt.  I discussed head CT.  Boyfriend, sister, and patient at this time declined repeat head CT as she had one 2 days ago.  According to boyfriend and family, patient is acting at her baseline.  The pt is resting comfortably and is NAD.  Pt states their sx have improved.  And has been on of her clonazepam.  She's been on it since she was younger.  Symptoms appear to be more consistent with pseudoseizure.  [place] acute changes.  Patient is awake alert oriented.  She is smiling and conversational.  [place] Coma Scale 15.  As her precautions were reiterated.  All questions answered.  Discussed test results, shared treatment plan, specific conditions for return, and the need for f/u.  Answered their questions at this time.  Pt understands and agrees to the plan.  The pt has remained hemodynamically stable through ED course and is stable for discharge.     Follow-up Information     VIVIANA Weaver. Schedule an appointment as soon as possible for a visit in 2 days.    Specialty:  Family Medicine  Why:  No driving, no baths.   Return to the ED for:  prolonged seizure, fever, confusion, numbness/weakness onone side or other concerns.  Contact information:  [id number] [address] [phone]             Your Neurologist as scheduled..                   Current Discharge Medication List           Current Discharge Medication List      STOP taking these medications       citalopram (CELEXA) 20 MG tablet Comments:   Reason for Stopping:         hydrocodone-acetaminophen 10-325mg (NORCO)  mg Tab Comments:   Reason for Stopping:         loratadine (CLARITIN) 10 mg tablet Comments:   Reason for Stopping:         predniSONE (DELTASONE) 10 MG tablet Comments:   Reason for Stopping:             Current  Discharge Medication List      CONTINUE these medications which have CHANGED    Details   clonazePAM (KLONOPIN) 1 MG tablet Take 0.5 tablets (0.5 mg total) by mouth 2 (two) times daily as needed for Anxiety.  Qty: 10 tablet, Refills: 0             Patient's headache is either consistent with previous headache and/or lacks features concerning for emergent or life threatening condition.  I do not suspect SAH, meningitis, increased IC pressure, infectious, toxic, vascular, CNS, or other EMC.  I have discussed this at length with patient and/or family/caretaker.    Patient presents with seizure or seizure-like symptoms. I have no suspicion of an intracranial, traumatic, infectious, metabolic, toxic, cardiovascular (specifically arrhythmia), or other emergent medical condition requiring further intervention. Seizure precautions were discussed with the patient and/or caretaker, specifically not to swim unattended, not to operate motor vehicles or other machinery, and to avoid heights or other areas where falls may occur until cleared by primary care physician. Patient is safe for discharge.    Clinical Impression:       ICD-10-CM ICD-9-CM   1. Chronic nonintractable headache, unspecified headache type R51 784.0   2. Cough R05 786.2   3. 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 Seizure disorder G40.909 345.90   5. Tobacco abuse Z72.0 305.1   6. Mild tetrahydrocannabinol (THC) abuse F12.10 305.20         Disposition:   Disposition: Discharged  Condition: Stable                        Sammi Blevins, DO  07/23/17 0880

## 2017-07-23 NOTE — ED NOTES
Pt has occasional seizure like activity (jerking) that last about 20-30 seconds. Pt awake with no postictal period. AAO x 3 after each episode.

## 2017-07-25 LAB — LEVETIRACETAM SERPL-MCNC: 12.7 UG/ML (ref 3–60)

## 2017-08-10 ENCOUNTER — HOSPITAL ENCOUNTER (EMERGENCY)
Facility: HOSPITAL | Age: 37
Discharge: HOME | End: 2017-08-10
Attending: EMERGENCY MEDICINE
Payer: MEDICAID

## 2017-08-10 VITALS
WEIGHT: 115 LBS | RESPIRATION RATE: 16 BRPM | TEMPERATURE: 99 F | SYSTOLIC BLOOD PRESSURE: 119 MMHG | HEART RATE: 96 BPM | HEIGHT: 62 IN | BODY MASS INDEX: 21.16 KG/M2 | DIASTOLIC BLOOD PRESSURE: 73 MMHG | OXYGEN SATURATION: 97 %

## 2017-08-10 DIAGNOSIS — F41.9 ANXIETY: ICD-10-CM

## 2017-08-10 DIAGNOSIS — R51.9 NONINTRACTABLE HEADACHE, UNSPECIFIED CHRONICITY PATTERN, UNSPECIFIED HEADACHE TYPE: Primary | ICD-10-CM

## 2017-08-10 LAB
ALBUMIN SERPL BCP-MCNC: 4.3 G/DL
ALP SERPL-CCNC: 65 U/L
ALT SERPL W/O P-5'-P-CCNC: 20 U/L
ANION GAP SERPL CALC-SCNC: 11 MMOL/L
AST SERPL-CCNC: 14 U/L
BASOPHILS # BLD AUTO: 0.04 K/UL
BASOPHILS NFR BLD: 0.6 %
BILIRUB SERPL-MCNC: 0.4 MG/DL
BUN SERPL-MCNC: 16 MG/DL
CALCIUM SERPL-MCNC: 8.8 MG/DL
CHLORIDE SERPL-SCNC: 109 MMOL/L
CO2 SERPL-SCNC: 19 MMOL/L
CREAT SERPL-MCNC: 0.8 MG/DL
DIFFERENTIAL METHOD: ABNORMAL
EOSINOPHIL # BLD AUTO: 0.2 K/UL
EOSINOPHIL NFR BLD: 3.1 %
ERYTHROCYTE [DISTWIDTH] IN BLOOD BY AUTOMATED COUNT: 12.2 %
EST. GFR  (AFRICAN AMERICAN): >60 ML/MIN/1.73 M^2
EST. GFR  (NON AFRICAN AMERICAN): >60 ML/MIN/1.73 M^2
GLUCOSE SERPL-MCNC: 88 MG/DL
HCT VFR BLD AUTO: 43.2 %
HGB BLD-MCNC: 14.5 G/DL
LYMPHOCYTES # BLD AUTO: 2.4 K/UL
LYMPHOCYTES NFR BLD: 36.6 %
MCH RBC QN AUTO: 31.5 PG
MCHC RBC AUTO-ENTMCNC: 33.6 G/DL
MCV RBC AUTO: 94 FL
MONOCYTES # BLD AUTO: 0.6 K/UL
MONOCYTES NFR BLD: 8.6 %
NEUTROPHILS # BLD AUTO: 3.3 K/UL
NEUTROPHILS NFR BLD: 50.8 %
PLATELET # BLD AUTO: 215 K/UL
PMV BLD AUTO: 9.9 FL
POTASSIUM SERPL-SCNC: 4.3 MMOL/L
PROT SERPL-MCNC: 7 G/DL
RBC # BLD AUTO: 4.61 M/UL
SODIUM SERPL-SCNC: 139 MMOL/L
WBC # BLD AUTO: 6.53 K/UL

## 2017-08-10 PROCEDURE — 25000003 PHARM REV CODE 250: Performed by: EMERGENCY MEDICINE

## 2017-08-10 PROCEDURE — 85025 COMPLETE CBC W/AUTO DIFF WBC: CPT

## 2017-08-10 PROCEDURE — 96361 HYDRATE IV INFUSION ADD-ON: CPT

## 2017-08-10 PROCEDURE — 96375 TX/PRO/DX INJ NEW DRUG ADDON: CPT

## 2017-08-10 PROCEDURE — 96374 THER/PROPH/DIAG INJ IV PUSH: CPT

## 2017-08-10 PROCEDURE — 80053 COMPREHEN METABOLIC PANEL: CPT

## 2017-08-10 PROCEDURE — 99284 EMERGENCY DEPT VISIT MOD MDM: CPT | Mod: 25

## 2017-08-10 PROCEDURE — 63600175 PHARM REV CODE 636 W HCPCS: Performed by: EMERGENCY MEDICINE

## 2017-08-10 RX ORDER — ONDANSETRON 2 MG/ML
4 INJECTION INTRAMUSCULAR; INTRAVENOUS
Status: COMPLETED | OUTPATIENT
Start: 2017-08-10 | End: 2017-08-10

## 2017-08-10 RX ORDER — HYDROMORPHONE HYDROCHLORIDE 2 MG/ML
1 INJECTION, SOLUTION INTRAMUSCULAR; INTRAVENOUS; SUBCUTANEOUS
Status: COMPLETED | OUTPATIENT
Start: 2017-08-10 | End: 2017-08-10

## 2017-08-10 RX ORDER — LORAZEPAM 2 MG/ML
1 INJECTION INTRAMUSCULAR
Status: COMPLETED | OUTPATIENT
Start: 2017-08-10 | End: 2017-08-10

## 2017-08-10 RX ORDER — MORPHINE SULFATE 4 MG/ML
4 INJECTION, SOLUTION INTRAMUSCULAR; INTRAVENOUS
Status: COMPLETED | OUTPATIENT
Start: 2017-08-10 | End: 2017-08-10

## 2017-08-10 RX ADMIN — LORAZEPAM 1 MG: 2 INJECTION INTRAMUSCULAR; INTRAVENOUS at 01:08

## 2017-08-10 RX ADMIN — ONDANSETRON HYDROCHLORIDE 4 MG: 2 INJECTION INTRAMUSCULAR; INTRAVENOUS at 12:08

## 2017-08-10 RX ADMIN — SODIUM CHLORIDE 1000 ML: 0.9 INJECTION, SOLUTION INTRAVENOUS at 12:08

## 2017-08-10 RX ADMIN — MORPHINE SULFATE 4 MG: 4 INJECTION, SOLUTION INTRAMUSCULAR; INTRAVENOUS at 12:08

## 2017-08-10 RX ADMIN — HYDROMORPHONE HYDROCHLORIDE 1 MG: 2 INJECTION, SOLUTION INTRAMUSCULAR; INTRAVENOUS; SUBCUTANEOUS at 01:08

## 2017-08-10 NOTE — ED PROVIDER NOTES
Encounter Date: 8/10/2017       History     Chief Complaint   Patient presents with    Tremors     history of pseudoseizures, received 5mg versed via [facility].      The history is provided by the patient.   Headache    This is a chronic problem. The current episode started in the past 7 days. The problem occurs daily. The problem has been unchanged. The pain is located in the bilateral region. The pain does not radiate. The pain quality is similar to prior headaches. The quality of the pain is described as aching. Pertinent negatives include no back pain, blurred vision, fever, nausea, seizures, sinus pressure, sore throat, swollen glands or weakness.     Review of patient's allergies indicates:  No Known Allergies  Past Medical History:   Diagnosis Date    Anxiety     Cerebral aneurysm rupture     Chronic headache     Depression     Stroke      Past Surgical History:   Procedure Laterality Date    BRAIN SURGERY      CHOLECYSTECTOMY      CRANIOTOMY      HERNIA REPAIR      HYSTERECTOMY       Family History   Problem Relation Age of Onset    No Known Problems Mother     Arthritis Father      Social History   Substance Use Topics    Smoking status: Current Every Day Smoker     Packs/day: 0.50     Types: Cigarettes    Smokeless tobacco: Never Used    Alcohol use Yes      Comment: occasional     Review of Systems   Constitutional: Negative for fever.   HENT: Negative for sinus pressure and sore throat.    Eyes: Negative for blurred vision.   Respiratory: Negative for shortness of breath.    Cardiovascular: Negative for chest pain.   Gastrointestinal: Negative for nausea.   Genitourinary: Negative for dysuria.   Musculoskeletal: Negative for back pain.   Skin: Negative for rash.   Neurological: Positive for headaches. Negative for seizures and weakness.   Hematological: Does not bruise/bleed easily.       Physical Exam     Initial Vitals [08/10/17 1236]   BP Pulse Resp Temp SpO2   136/71 (!) 115 18 98.5 °F  (36.9 °C) 98 %      MAP       92.67         Physical Exam    Nursing note and vitals reviewed.  Constitutional: She appears well-developed and well-nourished. No distress.   HENT:   Head: Normocephalic and atraumatic.   Mouth/Throat: Oropharynx is clear and moist.   Eyes: Conjunctivae and EOM are normal. Pupils are equal, round, and reactive to light.   Neck: Normal range of motion. Neck supple.   Cardiovascular: Normal rate, regular rhythm and normal heart sounds. Exam reveals no gallop and no friction rub.    No murmur heard.  Pulmonary/Chest: Breath sounds normal. No respiratory distress. She has no wheezes. She has no rhonchi. She has no rales.   Abdominal: Soft. Bowel sounds are normal. She exhibits no distension and no mass. There is no tenderness. There is no rebound and no guarding.   Musculoskeletal: Normal range of motion. She exhibits no edema or tenderness.   Neurological: She is alert and oriented to person, place, and time. She has normal strength.   Patient with hip thrusting and arm jerking, which stops when you talk to her.  She answers questions appropriately immediately after jerking stops.  No postictal state.   Skin: Skin is warm and dry. No rash noted.   Psychiatric: She has a normal mood and affect. Thought content normal.         ED Course   Procedures  Labs Reviewed   CBC W/ AUTO DIFFERENTIAL - Abnormal; Notable for the following:        Result Value    MCH 31.5 (*)     All other components within normal limits   COMPREHENSIVE METABOLIC PANEL - Abnormal; Notable for the following:     CO2 19 (*)     All other components within normal limits   URINALYSIS   DRUG SCREEN PANEL, URINE EMERGENCY   URINALYSIS   DRUG SCREEN PANEL, URINE EMERGENCY        ED Vital Signs:  Vitals:    08/10/17 1236 08/10/17 1247 08/10/17 1310   BP: 136/71  119/73   Pulse: (!) 115 (!) 115 96   Resp: 18  16   Temp: 98.5 °F (36.9 °C)  99.2 °F (37.3 °C)   TempSrc: Oral  Oral   SpO2: 98%  97%   Weight: 52.2 kg (115 lb)    "  Height: 5' 2" (1.575 m)           Abnormal Lab Results:  Labs Reviewed   CBC W/ AUTO DIFFERENTIAL - Abnormal; Notable for the following:        Result Value    MCH 31.5 (*)     All other components within normal limits   COMPREHENSIVE METABOLIC PANEL - Abnormal; Notable for the following:     CO2 19 (*)     All other components within normal limits   URINALYSIS   DRUG SCREEN PANEL, URINE EMERGENCY   URINALYSIS   DRUG SCREEN PANEL, URINE EMERGENCY          All Lab Results:  Results for orders placed or performed during the hospital encounter of 08/10/17   CBC auto differential   Result Value Ref Range    WBC 6.53 3.90 - 12.70 K/uL    RBC 4.61 4.00 - 5.40 M/uL    Hemoglobin 14.5 12.0 - 16.0 g/dL    Hematocrit 43.2 37.0 - 48.5 %    MCV 94 82 - 98 fL    MCH 31.5 (H) 27.0 - 31.0 pg    MCHC 33.6 32.0 - 36.0 g/dL    RDW 12.2 11.5 - 14.5 %    Platelets 215 150 - 350 K/uL    MPV 9.9 9.2 - 12.9 fL    Gran # 3.3 1.8 - 7.7 K/uL    Lymph # 2.4 1.0 - 4.8 K/uL    Mono # 0.6 0.3 - 1.0 K/uL    Eos # 0.2 0.0 - 0.5 K/uL    Baso # 0.04 0.00 - 0.20 K/uL    Gran% 50.8 38.0 - 73.0 %    Lymph% 36.6 18.0 - 48.0 %    Mono% 8.6 4.0 - 15.0 %    Eosinophil% 3.1 0.0 - 8.0 %    Basophil% 0.6 0.0 - 1.9 %    Differential Method Automated    Comprehensive metabolic panel   Result Value Ref Range    Sodium 139 136 - 145 mmol/L    Potassium 4.3 3.5 - 5.1 mmol/L    Chloride 109 95 - 110 mmol/L    CO2 19 (L) 23 - 29 mmol/L    Glucose 88 70 - 110 mg/dL    BUN, Bld 16 6 - 20 mg/dL    Creatinine 0.8 0.5 - 1.4 mg/dL    Calcium 8.8 8.7 - 10.5 mg/dL    Total Protein 7.0 6.0 - 8.4 g/dL    Albumin 4.3 3.5 - 5.2 g/dL    Total Bilirubin 0.4 0.1 - 1.0 mg/dL    Alkaline Phosphatase 65 55 - 135 U/L    AST 14 10 - 40 U/L    ALT 20 10 - 44 U/L    Anion Gap 11 8 - 16 mmol/L    eGFR if African American >60.0 >60 mL/min/1.73 m^2    eGFR if non African American >60.0 >60 mL/min/1.73 m^2           Imaging Results:  Imaging Results          CT Head Without Contrast (Final " result)  Result time 08/10/17 12:55:43    Final result by SUSANA Graff Sr., MD (08/10/17 12:55:43)                 Impression:      1. There is no evidence of an acute ischemic event.   2. There is no intracranial hemorrhage.   3. There are craniotomy changes on the right side of the skull. There are encephalomalacic changes in the right occipital temporal region with a mild amount of calcification in this area.       All CT scans at this facility use dose modulation, iterative reconstruction, and/or weight base dosing when appropriate to reduce radiation dose when appropriate to reduce radiation dose to as low as reasonably achievable.      Electronically signed by: SUSANA GRAFF MD  Date:     08/10/17  Time:    12:55              Narrative:    CT of Head without IV contrast    History:     Headache    Technique: Standard brain CT protocol without IV contrast was performed.     Finding: Comparison was made to prior examination performed on 5/8/2017. There are craniotomy changes on the right side of the skull. There are encephalomalacic changes in the right occipital temporal region with a mild amount of calcification in this area. There is no evidence of an acute ischemic event. There is no intracranial hemorrhage. The visualized portion of the paranasal sinuses is clear.                                 The Emergency Provider reviewed the vital signs and test results, which are outlined above.    ED Discussions:  1:51 PM: Reassessed pt at this time.  Pt states her condition has improved at this time. Discussed with pt all pertinent ED information and results. Discussed pt dx of headache and plan of tx. Gave pt all f/u and return to the ED instructions. All questions and concerns were addressed at this time. Pt expresses understanding of information and instructions, and is comfortable with plan to discharge. Pt is stable for discharge.                                 ED Course     Clinical Impression:        ICD-10-CM ICD-9-CM   1. Nonintractable headache, unspecified chronicity pattern, unspecified headache type R51 784.0   2. Anxiety F41.9 300.00         Disposition:   Disposition: Discharged  Condition: Stable                        Tavo Osorio MD  08/10/17 8342

## 2017-08-10 NOTE — ED NOTES
Received report from Erinn Figueredo. Patient noted to be shaking and vibrating on ER stretcher at present. Spouse at bedside. When talking to patient patient able to respond appropriately and decrease shaking. Spouse reports patient has appt with Neurologist in [place] in  In a few months and is inquiring if the MD here and get her a sooner appt.  Pt in SR to ST on Cm. BBSCTA, +BS, No incontinence noted with tremors/shaking, no drooping or drooling noted. Airway patent, Skin W/D, resp even and unlabored. Patient c/o of a right sided headache that she rates 9/10 at present. MD made aware , will con't to monitor.

## 2017-08-10 NOTE — ED NOTES
Pt presents to ED after call from [facility] reported seizure like activity. Upon arrival, pt having what appeared to be tremors that she could stop on demand. Pt making eye contact during tremors. Pt oriented when asked and will answer questions appropriately. History of brain bleed and brain surgery as well as severe headaches Pt has not seen a neurologist since d/c from hospital but does have an appt for October.     Level of Consciousness: The patient is awake, alert, and oriented to person, place and time. Pts affect is appropriate, speech is appropriate.   Appearance: Pt is resting in stretcher, no acute distress is noted. Clothing and hygiene are appropriate.   Skin: Skin is W/D/I. Normal skin turgor. Mucous membranes are moist. Skin color normal.   Musculoskeletal: Moves all extremities well, full range of motion. No obvious deformities noted. Pt ambulates independently.   Respiratory: Airway open and patent. Respiration rate even and unlabored. No use of accessory muscles noted.    Cardiac: Regular rate and rhythm. No peripheral edema noted. Peripheral pulses palpated. Capillary refill brisk.   Abdomen: No distension noted. Soft and non-tender to palpation.  Neurologic: Symmetrical expression noted in face. Hand grasps equal.  Psychosocial: Family at bedside.     Pt made aware of plan of care, verbalizes understanding and denies any questions at this time. Bed low and locked, side rails upx2. Call light in reach. Cardiac monitor applied, alarms on. Will continue to monitor patient.

## 2017-08-10 NOTE — ED NOTES
"Patient ok to be Dc'd by Dr. Osorio. Dilaudid decreased patients pain to 0/10 by discharge. No tremors noted on dc. Pt dressed self and ambulated to W/C on Discharge. Dr Osorio also is aware of the urine and the UDS that was not completed but he reports " It's ok we don't need it"  "

## 2017-08-11 ENCOUNTER — HOSPITAL ENCOUNTER (EMERGENCY)
Facility: HOSPITAL | Age: 37
Discharge: HOME | End: 2017-08-11
Attending: EMERGENCY MEDICINE
Payer: MEDICAID

## 2017-08-11 VITALS
DIASTOLIC BLOOD PRESSURE: 75 MMHG | TEMPERATURE: 99 F | SYSTOLIC BLOOD PRESSURE: 119 MMHG | HEART RATE: 70 BPM | BODY MASS INDEX: 21.16 KG/M2 | HEIGHT: 62 IN | WEIGHT: 115 LBS | OXYGEN SATURATION: 97 % | RESPIRATION RATE: 16 BRPM

## 2017-08-11 DIAGNOSIS — R51.9 ACUTE NONINTRACTABLE HEADACHE, UNSPECIFIED HEADACHE TYPE: Primary | ICD-10-CM

## 2017-08-11 DIAGNOSIS — F44.5 PSEUDOSEIZURES: ICD-10-CM

## 2017-08-11 DIAGNOSIS — F41.9 ANXIETY: ICD-10-CM

## 2017-08-11 PROCEDURE — 96374 THER/PROPH/DIAG INJ IV PUSH: CPT

## 2017-08-11 PROCEDURE — 25000003 PHARM REV CODE 250: Performed by: EMERGENCY MEDICINE

## 2017-08-11 PROCEDURE — 96375 TX/PRO/DX INJ NEW DRUG ADDON: CPT

## 2017-08-11 PROCEDURE — 63600175 PHARM REV CODE 636 W HCPCS: Performed by: EMERGENCY MEDICINE

## 2017-08-11 PROCEDURE — 96361 HYDRATE IV INFUSION ADD-ON: CPT

## 2017-08-11 PROCEDURE — 99284 EMERGENCY DEPT VISIT MOD MDM: CPT | Mod: 25

## 2017-08-11 RX ORDER — METOCLOPRAMIDE 10 MG/1
10 TABLET ORAL EVERY 6 HOURS PRN
Qty: 16 TABLET | Refills: 0 | Status: SHIPPED | OUTPATIENT
Start: 2017-08-11 | End: 2018-12-01

## 2017-08-11 RX ORDER — HYDROCODONE BITARTRATE AND ACETAMINOPHEN 7.5; 325 MG/1; MG/1
1 TABLET ORAL EVERY 6 HOURS PRN
Qty: 10 TABLET | Refills: 0 | Status: SHIPPED | OUTPATIENT
Start: 2017-08-11 | End: 2017-08-16

## 2017-08-11 RX ORDER — HYDROMORPHONE HYDROCHLORIDE 2 MG/ML
1 INJECTION, SOLUTION INTRAMUSCULAR; INTRAVENOUS; SUBCUTANEOUS
Status: COMPLETED | OUTPATIENT
Start: 2017-08-11 | End: 2017-08-11

## 2017-08-11 RX ORDER — DIPHENHYDRAMINE HYDROCHLORIDE 50 MG/ML
25 INJECTION INTRAMUSCULAR; INTRAVENOUS
Status: COMPLETED | OUTPATIENT
Start: 2017-08-11 | End: 2017-08-11

## 2017-08-11 RX ORDER — METOCLOPRAMIDE HYDROCHLORIDE 5 MG/ML
10 INJECTION INTRAMUSCULAR; INTRAVENOUS
Status: COMPLETED | OUTPATIENT
Start: 2017-08-11 | End: 2017-08-11

## 2017-08-11 RX ADMIN — METOCLOPRAMIDE 10 MG: 5 INJECTION, SOLUTION INTRAMUSCULAR; INTRAVENOUS at 12:08

## 2017-08-11 RX ADMIN — HYDROMORPHONE HYDROCHLORIDE 1 MG: 2 INJECTION, SOLUTION INTRAMUSCULAR; INTRAVENOUS; SUBCUTANEOUS at 12:08

## 2017-08-11 RX ADMIN — DIPHENHYDRAMINE HYDROCHLORIDE 25 MG: 50 INJECTION, SOLUTION INTRAMUSCULAR; INTRAVENOUS at 12:08

## 2017-08-11 RX ADMIN — SODIUM CHLORIDE 500 ML: 0.9 INJECTION, SOLUTION INTRAVENOUS at 12:08

## 2017-08-12 NOTE — ED PROVIDER NOTES
"Encounter Date: 8/11/2017       History     Chief Complaint   Patient presents with    Headache     since last night. pt reports seizures today      The history is provided by the patient.   Headache    This is a chronic problem. The current episode started yesterday. The problem occurs intermittently. The problem has been waxing and waning. The pain is located in the bilateral region. The pain does not radiate. The pain quality is similar to prior headaches. The quality of the pain is described as aching and pounding. The pain is at a severity of 8/10. Associated symptoms include muscle aches, nausea, seizures and weakness. Pertinent negatives include no abdominal pain, abnormal behavior, back pain, dizziness, fever, neck pain, scalp tenderness or sore throat. Exacerbated by: sz. She has tried NSAIDs for the symptoms. The treatment provided mild relief. There is no history of migraine headaches or recent head traumas. (Sz/brain surg)     Pt c long hx sz/pseudosz/ HA seen here multiple times in the past for the same.  Presented yesterday c sim. Sx and s and was given "dilaudid" that made her feel better.  Yesterday she was seen for sz but she commonly has HA after her sz and she thinks that is what is causing her HA today.  Pt has had difficulty c f/u neurology and says her PCP will not assist or give her pain meds.  She is due to see [facility] neuro in Oct.  She attempted [facility] in [place] but says they will not see her due to the complexity of her disorder.      Review of patient's allergies indicates:  No Known Allergies  Past Medical History:   Diagnosis Date    Anxiety     Cerebral aneurysm rupture     Chronic headache     Depression     Stroke      Past Surgical History:   Procedure Laterality Date    BRAIN SURGERY      CHOLECYSTECTOMY      CRANIOTOMY      HERNIA REPAIR      HYSTERECTOMY       Family History   Problem Relation Age of Onset    No Known Problems Mother     Arthritis Father      Social " "History   Substance Use Topics    Smoking status: Current Every Day Smoker     Packs/day: 0.50     Types: Cigarettes    Smokeless tobacco: Never Used    Alcohol use Yes      Comment: occasional     Review of Systems   Constitutional: Negative for fever.   HENT: Negative for sore throat.    Respiratory: Negative for shortness of breath.    Cardiovascular: Negative for chest pain.   Gastrointestinal: Positive for nausea. Negative for abdominal pain.   Genitourinary: Negative for dysuria.   Musculoskeletal: Negative for back pain and neck pain.   Skin: Negative for rash.   Neurological: Positive for tremors, seizures, weakness and headaches. Negative for dizziness, syncope and speech difficulty.   Hematological: Does not bruise/bleed easily.   All other systems reviewed and are negative.      Physical Exam     Initial Vitals [08/11/17 1125]   BP Pulse Resp Temp SpO2   (!) 167/95 (!) 118 20 98.5 °F (36.9 °C) 99 %      MAP       119           Vitals:    08/11/17 1125 08/11/17 1245   BP: (!) 167/95 119/75   Pulse: (!) 118 70   Resp: 20 16   Temp: 98.5 °F (36.9 °C)    TempSrc: Oral    SpO2: 99% 97%   Weight: 52.2 kg (115 lb)    Height: 5' 2" (1.575 m)       Physical Exam    Nursing note and vitals reviewed.  Constitutional: She appears well-developed and well-nourished. No distress.   HENT:   Head: Normocephalic and atraumatic.   Eyes: EOM are normal. Pupils are equal, round, and reactive to light.   Neck: Normal range of motion. Neck supple.   Cardiovascular: Normal rate, regular rhythm, normal heart sounds and intact distal pulses.   Pulmonary/Chest: Breath sounds normal. No stridor. She has no wheezes. She has no rhonchi.   Abdominal: Soft. Bowel sounds are normal. She exhibits no mass. There is no rebound and no guarding.   Musculoskeletal: Normal range of motion. She exhibits no edema.   Neurological: She is alert and oriented to person, place, and time. She has normal strength. She displays tremor. No cranial " "nerve deficit or sensory deficit. She displays seizure activity. GCS eye subscore is 4. GCS verbal subscore is 5. GCS motor subscore is 6.   Pt actively shaking B upper/lower ext, moaning and yelling, but is distractable and will answer questions.  I witnessed her symptoms from my desk and were unrelenting and dramatic until I went to the bedside to discuss H&P and tx options and her "sz" activity stopped for the entire time I was in the room.  No post ictal state.  I suspect pseudosz activity.   Skin: Skin is warm and dry. No rash noted.   Psychiatric: She has a normal mood and affect. Her behavior is normal. Judgment and thought content normal.         ED Course   Procedures  Labs Reviewed - No data to display                            ED Course   Ct head done yesterday and pt had complete resolution of symptoms c pain meds.  I had a long discussion c pt and family about need for neuro and PCP involvement in her care.  I did offer some meds for symptomatic relief but counseled them that the ED is not a treatment facility for chronic complaints and management of her sx/s falls in the responsibility of her providers.  They voiced understanding and frustrations over their lack of access to their PCP and subspecialist.  They will f/u c their docs and return to the ED for any worsening sx/s.    Patient presents with seizure or seizure-like symptoms. I have no suspicion of an intracranial, traumatic, infectious, metabolic, toxic, cardiovascular (specifically arrhythmia), or other emergent medical condition requiring further intervention. Seizure precautions were discussed with the patient and/or caretaker, specifically not to swim unattended, not to operate motor vehicles or other machinery, and to avoid heights or other areas where falls may occur until cleared by primary care physician. Patient is safe for discharge.      Clinical Impression:   The primary encounter diagnosis was Acute nonintractable headache, " unspecified headache type. Diagnoses of Pseudoseizures and Anxiety were also pertinent to this visit.    Disposition:   Disposition: Discharged  Condition: Stable                        Obi Owen MD  08/12/17 1333       Obi Owen MD  08/12/17 6110

## 2017-08-16 ENCOUNTER — HOSPITAL ENCOUNTER (EMERGENCY)
Facility: HOSPITAL | Age: 37
Discharge: HOME | End: 2017-08-16
Attending: EMERGENCY MEDICINE
Payer: MEDICAID

## 2017-08-16 VITALS
HEART RATE: 71 BPM | SYSTOLIC BLOOD PRESSURE: 134 MMHG | DIASTOLIC BLOOD PRESSURE: 81 MMHG | OXYGEN SATURATION: 99 % | HEIGHT: 62 IN | BODY MASS INDEX: 20.98 KG/M2 | RESPIRATION RATE: 16 BRPM | TEMPERATURE: 98 F | WEIGHT: 114 LBS

## 2017-08-16 DIAGNOSIS — G89.29 CHRONIC NONINTRACTABLE HEADACHE, UNSPECIFIED HEADACHE TYPE: ICD-10-CM

## 2017-08-16 DIAGNOSIS — R51.9 CHRONIC NONINTRACTABLE HEADACHE, UNSPECIFIED HEADACHE TYPE: ICD-10-CM

## 2017-08-16 DIAGNOSIS — F44.5 PSEUDOSEIZURES: Primary | ICD-10-CM

## 2017-08-16 LAB
ALBUMIN SERPL BCP-MCNC: 4.1 G/DL
ALP SERPL-CCNC: 67 U/L
ALT SERPL W/O P-5'-P-CCNC: 21 U/L
AMPHET+METHAMPHET UR QL: NEGATIVE
ANION GAP SERPL CALC-SCNC: 10 MMOL/L
APAP SERPL-MCNC: <3 UG/ML
AST SERPL-CCNC: 14 U/L
B-HCG UR QL: NEGATIVE
BARBITURATES UR QL SCN>200 NG/ML: NEGATIVE
BASOPHILS # BLD AUTO: 0.03 K/UL
BASOPHILS NFR BLD: 0.5 %
BENZODIAZ UR QL SCN>200 NG/ML: NORMAL
BILIRUB SERPL-MCNC: 0.4 MG/DL
BILIRUB UR QL STRIP: NEGATIVE
BUN SERPL-MCNC: 12 MG/DL
BZE UR QL SCN: NEGATIVE
CALCIUM SERPL-MCNC: 9.3 MG/DL
CANNABINOIDS UR QL SCN: NORMAL
CHLORIDE SERPL-SCNC: 110 MMOL/L
CLARITY UR REFRACT.AUTO: CLEAR
CO2 SERPL-SCNC: 21 MMOL/L
COLOR UR AUTO: YELLOW
CREAT SERPL-MCNC: 0.7 MG/DL
CREAT UR-MCNC: 127.6 MG/DL
DIFFERENTIAL METHOD: ABNORMAL
EOSINOPHIL # BLD AUTO: 0.1 K/UL
EOSINOPHIL NFR BLD: 2.1 %
ERYTHROCYTE [DISTWIDTH] IN BLOOD BY AUTOMATED COUNT: 12.2 %
EST. GFR  (AFRICAN AMERICAN): >60 ML/MIN/1.73 M^2
EST. GFR  (NON AFRICAN AMERICAN): >60 ML/MIN/1.73 M^2
ETHANOL SERPL-MCNC: <10 MG/DL
GLUCOSE SERPL-MCNC: 96 MG/DL
GLUCOSE UR QL STRIP: NEGATIVE
HCT VFR BLD AUTO: 40.8 %
HGB BLD-MCNC: 13.8 G/DL
HGB UR QL STRIP: NEGATIVE
KETONES UR QL STRIP: NEGATIVE
LEUKOCYTE ESTERASE UR QL STRIP: NEGATIVE
LYMPHOCYTES # BLD AUTO: 1.8 K/UL
LYMPHOCYTES NFR BLD: 27.7 %
MCH RBC QN AUTO: 31.4 PG
MCHC RBC AUTO-ENTMCNC: 33.8 G/DL
MCV RBC AUTO: 93 FL
METHADONE UR QL SCN>300 NG/ML: NEGATIVE
MONOCYTES # BLD AUTO: 0.4 K/UL
MONOCYTES NFR BLD: 6.5 %
NEUTROPHILS # BLD AUTO: 4.2 K/UL
NEUTROPHILS NFR BLD: 63 %
NITRITE UR QL STRIP: NEGATIVE
OPIATES UR QL SCN: NEGATIVE
PCP UR QL SCN>25 NG/ML: NEGATIVE
PH UR STRIP: 6 [PH] (ref 5–8)
PLATELET # BLD AUTO: 183 K/UL
PMV BLD AUTO: 10.6 FL
POTASSIUM SERPL-SCNC: 4.2 MMOL/L
PROT SERPL-MCNC: 7.2 G/DL
PROT UR QL STRIP: NEGATIVE
RBC # BLD AUTO: 4.39 M/UL
SODIUM SERPL-SCNC: 141 MMOL/L
SP GR UR STRIP: 1.02 (ref 1–1.03)
TOXICOLOGY INFORMATION: NORMAL
TSH SERPL DL<=0.005 MIU/L-ACNC: 2.1 UIU/ML
URN SPEC COLLECT METH UR: NORMAL
UROBILINOGEN UR STRIP-ACNC: NEGATIVE EU/DL
WBC # BLD AUTO: 6.61 K/UL

## 2017-08-16 PROCEDURE — 80320 DRUG SCREEN QUANTALCOHOLS: CPT

## 2017-08-16 PROCEDURE — P9612 CATHETERIZE FOR URINE SPEC: HCPCS

## 2017-08-16 PROCEDURE — 85025 COMPLETE CBC W/AUTO DIFF WBC: CPT

## 2017-08-16 PROCEDURE — 96360 HYDRATION IV INFUSION INIT: CPT

## 2017-08-16 PROCEDURE — 25000003 PHARM REV CODE 250: Performed by: EMERGENCY MEDICINE

## 2017-08-16 PROCEDURE — 81003 URINALYSIS AUTO W/O SCOPE: CPT

## 2017-08-16 PROCEDURE — 99284 EMERGENCY DEPT VISIT MOD MDM: CPT | Mod: 25

## 2017-08-16 PROCEDURE — 81025 URINE PREGNANCY TEST: CPT

## 2017-08-16 PROCEDURE — 84443 ASSAY THYROID STIM HORMONE: CPT

## 2017-08-16 PROCEDURE — 80307 DRUG TEST PRSMV CHEM ANLYZR: CPT

## 2017-08-16 PROCEDURE — 80053 COMPREHEN METABOLIC PANEL: CPT

## 2017-08-16 PROCEDURE — 80329 ANALGESICS NON-OPIOID 1 OR 2: CPT

## 2017-08-16 RX ORDER — SODIUM CHLORIDE 9 MG/ML
1000 INJECTION, SOLUTION INTRAVENOUS
Status: COMPLETED | OUTPATIENT
Start: 2017-08-16 | End: 2017-08-16

## 2017-08-16 RX ORDER — BUTALBITAL, ACETAMINOPHEN AND CAFFEINE 50; 325; 40 MG/1; MG/1; MG/1
1 TABLET ORAL EVERY 4 HOURS PRN
Qty: 30 TABLET | Refills: 0 | Status: SHIPPED | OUTPATIENT
Start: 2017-08-16 | End: 2017-09-16

## 2017-08-16 RX ADMIN — SODIUM CHLORIDE 1000 ML: 0.9 INJECTION, SOLUTION INTRAVENOUS at 11:08

## 2017-08-16 NOTE — ED PROVIDER NOTES
Encounter Date: 8/16/2017       History     Chief Complaint   Patient presents with    Seizures     pt arrived per amb with c/o seizures this am. versed given 10mg enroute . 18g left wrist area     The history is provided by the patient, a relative and the EMS personnel.   Seizures    This is a chronic problem. The current episode started just prior to arrival. The problem has been gradually improving. There were 2 to 3 seizures. The most recent episode lasted 2 to 5 minutes. Associated symptoms include headaches. Pertinent negatives include no sleepiness, no confusion, no speech difficulty, no visual disturbance, no neck stiffness, no sore throat, no chest pain, no cough, no nausea, no vomiting, no diarrhea and no muscle weakness. Characteristics include eye blinking and rhythmic jerking. Characteristics do not include eye deviation, bowel incontinence, bladder incontinence, loss of consciousness (pt able to voluntarily stop her shaking to answer questions, then returns to shaking.  there does not seem to be a foci for seizure-like activity.), bit tongue, apnea or cyanosis. The episode was witnessed. There was no sensation of an aura present. The seizures continued in the ED (pt recieved 10mg versed in ambulance.  no meds administered here in ER.  pt was able to earnestine shaking activity via talking to patient and reassurance.). The seizure(s) had no focality (at times it seems shaking would start in pelvic area, then stop.  other times, starts in bilateral upper extremities.). Possible causes do not include medication or dosage change, sleep deprivation, missed seizure meds, recent illness or change in alcohol use. pt states she occasionally smokes marijuana. Meds prior to arrival: 10mg versed via EMS.     Review of patient's allergies indicates:  No Known Allergies  Past Medical History:   Diagnosis Date    Anxiety     Cerebral aneurysm rupture     Chronic headache     Depression     Stroke      Past Surgical  History:   Procedure Laterality Date    BRAIN SURGERY      CHOLECYSTECTOMY      CRANIOTOMY      HERNIA REPAIR      HYSTERECTOMY       Family History   Problem Relation Age of Onset    No Known Problems Mother     Arthritis Father      Social History   Substance Use Topics    Smoking status: Current Every Day Smoker     Packs/day: 0.50     Types: Cigarettes    Smokeless tobacco: Never Used    Alcohol use Yes      Comment: occasional     Review of Systems   Constitutional: Negative for fever.   HENT: Negative for sore throat.    Eyes: Negative for visual disturbance.   Respiratory: Negative for apnea, cough and shortness of breath.    Cardiovascular: Negative for chest pain and cyanosis.   Gastrointestinal: Negative for bowel incontinence, diarrhea, nausea and vomiting.   Genitourinary: Negative for bladder incontinence and dysuria.   Musculoskeletal: Negative for back pain.   Skin: Negative for rash.   Neurological: Positive for seizures and headaches. Negative for loss of consciousness (pt able to voluntarily stop her shaking to answer questions, then returns to shaking.  there does not seem to be a foci for seizure-like activity.), speech difficulty and weakness.   Hematological: Does not bruise/bleed easily.   Psychiatric/Behavioral: Negative for confusion.   All other systems reviewed and are negative.      Physical Exam     Initial Vitals [08/16/17 1024]   BP Pulse Resp Temp SpO2   136/88 105 20 98.4 °F (36.9 °C) 99 %      MAP       104         Physical Exam    Nursing note and vitals reviewed.  Constitutional: She appears well-developed and well-nourished.   HENT:   Head: Normocephalic and atraumatic.       Mouth/Throat: No oropharyngeal exudate.   HA in area diagrammed.  This is where her previous surgery and cranial plate placed. No new HA symptoms and similar to previous episodes.   Eyes: Conjunctivae and EOM are normal. Pupils are equal, round, and reactive to light.   Neck: Normal range of  motion. Neck supple. No thyromegaly present.   Cardiovascular: Normal rate, regular rhythm, normal heart sounds and intact distal pulses. Exam reveals no gallop and no friction rub.    No murmur heard.  Pulmonary/Chest: Breath sounds normal. No respiratory distress. She has no wheezes. She has no rhonchi. She exhibits no tenderness.   Abdominal: Soft. Bowel sounds are normal. She exhibits no distension. There is no tenderness. There is no rebound and no guarding.   Musculoskeletal: Normal range of motion. She exhibits no edema or tenderness.   Lymphadenopathy:     She has no cervical adenopathy.   Neurological: She is alert and oriented to person, place, and time. She has normal strength. No cranial nerve deficit or sensory deficit. GCS eye subscore is 4. GCS verbal subscore is 5. GCS motor subscore is 6.   Pt presents to ER with rhythmic shaking that can be voluntarily pause shaking to answer questions, then returns to shaking.  Moving all extremities.   Skin: Skin is warm and dry. No rash noted.   Psychiatric: She has a normal mood and affect. Her behavior is normal. Judgment and thought content normal.         ED Course   Procedures  Labs Reviewed   CBC W/ AUTO DIFFERENTIAL - Abnormal; Notable for the following:        Result Value    MCH 31.4 (*)     All other components within normal limits   COMPREHENSIVE METABOLIC PANEL - Abnormal; Notable for the following:     CO2 21 (*)     All other components within normal limits   ACETAMINOPHEN LEVEL - Abnormal; Notable for the following:     Acetaminophen (Tylenol), Serum <3.0 (*)     All other components within normal limits   TSH   URINALYSIS   DRUG SCREEN PANEL, URINE EMERGENCY   ALCOHOL,MEDICAL (ETHANOL)   PREGNANCY TEST, URINE RAPID          Vitals:    08/16/17 1024 08/16/17 1040 08/16/17 1132 08/16/17 1216   BP: 136/88  (!) 154/84 (!) 141/94   Pulse: 105 103 79 74   Resp: 20  (!) 23 20   Temp: 98.4 °F (36.9 °C)      TempSrc: Oral      SpO2: 99%  100% 99%  "  Weight: 51.7 kg (114 lb)      Height: 5' 2" (1.575 m)       08/16/17 1326   BP: 134/81   Pulse: 71   Resp: 16   Temp:    TempSrc:    SpO2: 99%   Weight:    Height:        Results for orders placed or performed during the hospital encounter of 08/16/17   CBC auto differential   Result Value Ref Range    WBC 6.61 3.90 - 12.70 K/uL    RBC 4.39 4.00 - 5.40 M/uL    Hemoglobin 13.8 12.0 - 16.0 g/dL    Hematocrit 40.8 37.0 - 48.5 %    MCV 93 82 - 98 fL    MCH 31.4 (H) 27.0 - 31.0 pg    MCHC 33.8 32.0 - 36.0 g/dL    RDW 12.2 11.5 - 14.5 %    Platelets 183 150 - 350 K/uL    MPV 10.6 9.2 - 12.9 fL    Gran # 4.2 1.8 - 7.7 K/uL    Lymph # 1.8 1.0 - 4.8 K/uL    Mono # 0.4 0.3 - 1.0 K/uL    Eos # 0.1 0.0 - 0.5 K/uL    Baso # 0.03 0.00 - 0.20 K/uL    Gran% 63.0 38.0 - 73.0 %    Lymph% 27.7 18.0 - 48.0 %    Mono% 6.5 4.0 - 15.0 %    Eosinophil% 2.1 0.0 - 8.0 %    Basophil% 0.5 0.0 - 1.9 %    Differential Method Automated    Comprehensive metabolic panel   Result Value Ref Range    Sodium 141 136 - 145 mmol/L    Potassium 4.2 3.5 - 5.1 mmol/L    Chloride 110 95 - 110 mmol/L    CO2 21 (L) 23 - 29 mmol/L    Glucose 96 70 - 110 mg/dL    BUN, Bld 12 6 - 20 mg/dL    Creatinine 0.7 0.5 - 1.4 mg/dL    Calcium 9.3 8.7 - 10.5 mg/dL    Total Protein 7.2 6.0 - 8.4 g/dL    Albumin 4.1 3.5 - 5.2 g/dL    Total Bilirubin 0.4 0.1 - 1.0 mg/dL    Alkaline Phosphatase 67 55 - 135 U/L    AST 14 10 - 40 U/L    ALT 21 10 - 44 U/L    Anion Gap 10 8 - 16 mmol/L    eGFR if African American >60.0 >60 mL/min/1.73 m^2    eGFR if non African American >60.0 >60 mL/min/1.73 m^2   TSH   Result Value Ref Range    TSH 2.096 0.400 - 4.000 uIU/mL   Urinalysis - clean catch   Result Value Ref Range    Specimen UA Urine, Catheterized     Color, UA Yellow Yellow, Straw, Jessy    Appearance, UA Clear Clear    pH, UA 6.0 5.0 - 8.0    Specific Gravity, UA 1.020 1.005 - 1.030    Protein, UA Negative Negative    Glucose, UA Negative Negative    Ketones, UA Negative Negative "    Bilirubin (UA) Negative Negative    Occult Blood UA Negative Negative    Nitrite, UA Negative Negative    Urobilinogen, UA Negative <2.0 EU/dL    Leukocytes, UA Negative Negative   Drug screen panel, emergency   Result Value Ref Range    Benzodiazepines Presumptve Positive     Methadone metabolites Negative     Cocaine (Metab.) Negative     Opiate Scrn, Ur Negative     Barbiturate Screen, Ur Negative     Amphetamine Screen, Ur Negative     THC Presumptive Positive     Phencyclidine Negative     Creatinine, Random Ur 127.6 15.0 - 325.0 mg/dL    Toxicology Information SEE COMMENT    Ethanol   Result Value Ref Range    Alcohol, Medical, Serum <10 <10 mg/dL   Acetaminophen level   Result Value Ref Range    Acetaminophen (Tylenol), Serum <3.0 (L) 10.0 - 20.0 ug/mL   Pregnancy, urine rapid   Result Value Ref Range    Preg Test, Ur Negative          Imaging Results    None         Medications   0.9%  NaCl infusion (0 mLs Intravenous Stopped 8/16/17 1211)         11:42 AM  - Re-evaluation:  The patient is resting comfortably and is in no acute distress. Discussed with pt and father about possibility of pseudoseizures.  Father agrees these episodes are associated with stress.  Due to recent CT head wnl last week, I do not feel a repeat CT is warranted since pt has not injured head and no new symptoms at this presentation.  Discussed test results and notified of pending labs. Answered questions at this time.     12:33 PM - Re-evaluation: The patient is resting comfortably and is in no acute distress.  Advised pt to f/u and keep apt with [facility]. She states that her symptoms have improved after treatment within ER. Discussed test results, shared treatment plan, specific conditions for return, and importance of follow up with patient and family.  She understands and agrees with the plan as discussed. Answered  her questions at this time. She has remained hemodynamically stable throughout the ED course and is  appropriate for discharge home.     SEIZURE PRECAUTION  [facility]- Department of Emergency Medicine has discussed and alerted you to the danger of driving, after being diagnosed with a Seizure or possible Seizures Disorder.  The situation of driving and Seizure Disorder is very dangerous for you as the patient, as well as others on the road. It was explained to you that seizure medication does not guarantee the full control of seizure episodes. Seizures can occur at any time and anywhere and in any situation.  Your Physician discussed with you Seizure Safety Precautions, and you were informed that patients with Seizure Disorders should avoid the followin. Unattended Swimming.  2. Working in the fireplace.  3. Climbing high ladders, steps, and trees.  4. Working with sharp cutting machines and tools, or any other potentially harmful activity.  You understand that the [facility] does not require the doctor to report Seizure Disorders to the Department of Motor Vehicles. However, you are aware that you, as a patient with a Seizure Disorder, are personally obligated to inform the doctor and the Motor Vehicle Department if a situation arises.    Pre-hypertension/Hypertension: The pt has been informed that they may have pre-hypertension or hypertension based on a blood pressure reading in the ED. I recommend that the pt call the PCP listed on their discharge instructions or a physician of their choice this week to arrange f/u for further evaluation of possible pre-hypertension or hypertension.     Isabelle Bonds was given a handout which discussed their disease process, precautions, and instructions for follow-up and therapy.    Follow-up Information     VIVIANA Weaver. Schedule an appointment as soon as possible for a visit in 1 week.    Specialty:  Family Medicine  Why:  and keep apt with [facility] in about 2 weeks  Contact information:  [id number]  [id number]  [phone]             [facility].    Specialty:  Emergency Medicine  Why:  As needed, If symptoms worsen  Contact information:  [address] [place] [id number] [phone]                 Discharge Medication List as of 8/16/2017 12:38 PM      START taking these medications    Details   butalbital-acetaminophen-caffeine -40 mg (FIORICET, ESGIC) -40 mg per tablet Take 1 tablet by mouth every 4 (four) hours as needed for Pain., Starting Wed 8/16/2017, Until Fri 9/15/2017, Print                ED Diagnosis  1. Pseudoseizures    2. Chronic nonintractable headache, unspecified headache type                             ED Course     Clinical Impression:   The primary encounter diagnosis was Pseudoseizures. A diagnosis of Chronic nonintractable headache, unspecified headache type was also pertinent to this visit.    Disposition:   Disposition: Discharged  Condition: Stable                        Renard Jansen Jr., MD  08/18/17 0654

## 2017-08-16 NOTE — DISCHARGE INSTRUCTIONS
Patient's headache is consistent with previous headaches and lacks features concerning for emergent or life threatening condition.  I do not suspect SAH, meningitis, increased IC pressure, infectious, toxic, vascular, CNS, or other EMC.  I have discussed this at length with patient.  Regarding treatment, advised patient to take nonsteroidal antiinflammatory medications, acetaminophen, or any medications prescribed as instructed.  To prevent headaches, patient advised to avoid muscle tension (do not stay in one position for long periods of time), avoid eye strain (make sure there is adequate lighting for reading and routine tasks), eat healthy foods, exercise, and do not smoke or drink excessive alcohol.  Patient also advised to avoid overuse of over-the-counter or prescription medications. Patient was instructed to contact primary healthcare provider if: headaches continue to get worse; occur often enough that they affect daily work or normal activities; frequent medication use is needed to manage headaches; headaches that worsen and cause vomiting; or there are any questions or concerns about the condition or care. Advised patient to return to the emergency department or call 911 if they develop a sudden headache that presents suddenly and much worse than usual headaches; have difficulty seeing, speaking, or moving; become confused or have seizure activity; or develop a fever and stiff neck with the headache.     SEIZURE PRECAUTION  [facility]- Department of Emergency Medicine has discussed and alerted you to the danger of driving, after being diagnosed with a Seizure or possible Seizures Disorder.  The situation of driving and Seizure Disorder is very dangerous for you as the patient, as well as others on the road. It was explained to you that seizure medication does not guarantee the full control of seizure episodes. Seizures can occur at any time and anywhere and in any situation.  Your Physician  discussed with you Seizure Safety Precautions, and you were informed that patients with Seizure Disorders should avoid the following:  Unattended Swimming.  Working in the fireplace.  Climbing high ladders, steps, and trees.  Working with sharp cutting machines and tools, or any other potentially harmful activity.  You understand that the [facility] does not require the doctor to report Seizure Disorders to the Department of Motor Vehicles. However, you are aware that you, as a patient with a Seizure Disorder, are personally obligated to inform the doctor and the Motor Vehicle Department if a situation arises.

## 2017-09-16 ENCOUNTER — HOSPITAL ENCOUNTER (EMERGENCY)
Facility: HOSPITAL | Age: 37
Discharge: HOME | End: 2017-09-16
Payer: MEDICAID

## 2017-09-16 VITALS
WEIGHT: 121 LBS | OXYGEN SATURATION: 99 % | DIASTOLIC BLOOD PRESSURE: 82 MMHG | RESPIRATION RATE: 18 BRPM | HEIGHT: 62 IN | HEART RATE: 79 BPM | BODY MASS INDEX: 22.26 KG/M2 | SYSTOLIC BLOOD PRESSURE: 158 MMHG | TEMPERATURE: 98 F

## 2017-09-16 DIAGNOSIS — T63.461A WASP STING, ACCIDENTAL OR UNINTENTIONAL, INITIAL ENCOUNTER: Primary | ICD-10-CM

## 2017-09-16 PROCEDURE — 63600175 PHARM REV CODE 636 W HCPCS: Performed by: PHYSICIAN ASSISTANT

## 2017-09-16 PROCEDURE — 25000003 PHARM REV CODE 250: Performed by: PHYSICIAN ASSISTANT

## 2017-09-16 PROCEDURE — 99283 EMERGENCY DEPT VISIT LOW MDM: CPT

## 2017-09-16 RX ORDER — PREDNISONE 20 MG/1
40 TABLET ORAL DAILY
Qty: 6 TABLET | Refills: 0 | Status: SHIPPED | OUTPATIENT
Start: 2017-09-16 | End: 2017-09-20

## 2017-09-16 RX ORDER — PREDNISONE 20 MG/1
60 TABLET ORAL
Status: COMPLETED | OUTPATIENT
Start: 2017-09-16 | End: 2017-09-16

## 2017-09-16 RX ORDER — FAMOTIDINE 20 MG/1
20 TABLET, FILM COATED ORAL 2 TIMES DAILY
Qty: 20 TABLET | Refills: 0 | Status: SHIPPED | OUTPATIENT
Start: 2017-09-16 | End: 2019-11-07 | Stop reason: CLARIF

## 2017-09-16 RX ORDER — DIPHENHYDRAMINE HCL 25 MG
50 CAPSULE ORAL
Status: COMPLETED | OUTPATIENT
Start: 2017-09-16 | End: 2017-09-16

## 2017-09-16 RX ORDER — FAMOTIDINE 20 MG/1
40 TABLET, FILM COATED ORAL
Status: COMPLETED | OUTPATIENT
Start: 2017-09-16 | End: 2017-09-16

## 2017-09-16 RX ORDER — PROPRANOLOL HYDROCHLORIDE 10 MG/1
10 TABLET ORAL 3 TIMES DAILY
COMMUNITY
End: 2018-12-10 | Stop reason: DRUGHIGH

## 2017-09-16 RX ADMIN — DIPHENHYDRAMINE HYDROCHLORIDE 50 MG: 25 CAPSULE ORAL at 06:09

## 2017-09-16 RX ADMIN — PREDNISONE 60 MG: 20 TABLET ORAL at 06:09

## 2017-09-16 RX ADMIN — FAMOTIDINE 40 MG: 20 TABLET ORAL at 06:09

## 2017-09-16 NOTE — ED PROVIDER NOTES
History      Chief Complaint   Patient presents with    Insect Bite     pt reports being stung by something. Allergic to red wasp. Swelling to both orbital area. resp e/u. took 1 benadryl pta       Review of patient's allergies indicates:  No Known Allergies     HPI   HPI    9/16/2017, 6:10 PM   History obtained from the patient      History of Present Illness: Isabelle Bonds is a 37 y.o. female patient who presents to the Emergency Department for insect sting at around 430 this afternoon.  She says she went outside and something stung forehead, +swelling to forehead and eyes.  Denies sob, oropharyngeal edema, cp. Symptoms are moderate in severity.     No further complaints or concerns at this time.           PCP: VIVIANA Weaver       Past Medical History:  Past Medical History:   Diagnosis Date    Anxiety     Cerebral aneurysm rupture     Chronic headache     Depression     Stroke          Past Surgical History:  Past Surgical History:   Procedure Laterality Date    BRAIN SURGERY      CHOLECYSTECTOMY      CRANIOTOMY      HERNIA REPAIR      HYSTERECTOMY             Family History:  Family History   Problem Relation Age of Onset    No Known Problems Mother     Arthritis Father            Social History:  Social History     Social History Main Topics    Smoking status: Current Every Day Smoker     Packs/day: 0.50     Types: Cigarettes    Smokeless tobacco: Never Used    Alcohol use Yes      Comment: occasional    Drug use: No    Sexual activity: Not Currently       ROS     Review of Systems   Constitutional: Negative for chills and fever.   HENT: Negative for sore throat and trouble swallowing.    Respiratory: Negative for chest tightness and shortness of breath.    Cardiovascular: Negative for chest pain.   Gastrointestinal: Negative for nausea.   Genitourinary: Negative for dysuria.   Musculoskeletal: Negative for back pain.   Skin: Negative for rash.   Neurological: Negative for  "weakness.   Hematological: Does not bruise/bleed easily.   All other systems reviewed and are negative.      Physical Exam      Initial Vitals [09/16/17 1758]   BP Pulse Resp Temp SpO2   (!) 158/82 79 18 98.2 °F (36.8 °C) 99 %      MAP       107.33         Physical Exam  Vital signs and nursing notes reviewed.  Constitutional: Patient is in NAD. Awake and alert. Well-developed and well-nourished.  Head: Atraumatic. Normocephalic.  Eyes: PERRL. EOM intact. Conjunctivae nl. No scleral icterus.  ENT: Mucous membranes are moist. Oropharynx is clear with no edema.  Neck: Supple. No JVD. No lymphadenopathy.  No meningismus  Cardiovascular: Regular rate and rhythm. No murmurs, rubs, or gallops. Distal pulses are 2+ and symmetric.  Pulmonary/Chest: No respiratory distress. Clear to auscultation bilaterally. No wheezing, rales, or rhonchi.  Abdominal: Soft. Non-distended. No TTP. No rebound, guarding, or rigidity. Good bowel sounds.  Genitourinary: No CVA tenderness  Musculoskeletal: Moves all extremities. No edema.   Skin: Warm and dry.  Forehead and periorbits with mild edema  Neurological: Awake and alert. No acute focal neurological deficits are appreciated.  Psychiatric: Normal affect. Good eye contact. Appropriate in content.      ED Course          Procedures  ED Vital Signs:  Vitals:    09/16/17 1758   BP: (!) 158/82   Pulse: 79   Resp: 18   Temp: 98.2 °F (36.8 °C)   TempSrc: Oral   SpO2: 99%   Weight: 54.9 kg (121 lb)   Height: 5' 2" (1.575 m)                 Imaging Results:  Imaging Results    None            The Emergency Provider reviewed the vital signs and test results, which are outlined above.    ED Discussion             Medication(s) given in the ER:  Medications   predniSONE tablet 60 mg (not administered)   diphenhydrAMINE capsule 50 mg (not administered)   famotidine tablet 40 mg (not administered)           Follow-up Information     VIVIANA Weaver In 2 days.    Specialty:  Family " Medicine  Contact information:  [address] [address]  [phone]                       New Prescriptions    FAMOTIDINE (PEPCID) 20 MG TABLET    Take 1 tablet (20 mg total) by mouth 2 (two) times daily.    PREDNISONE (DELTASONE) 20 MG TABLET    Take 2 tablets (40 mg total) by mouth once daily.          Medical Decision Making        All findings were reviewed with the patient/family in detail.   All remaining questions and concerns were addressed at that time.  Patient/family has been counseled regarding the need for follow-up as well as the indication to return to the emergency room should new or worrisome developments occur.        MDM               Clinical Impression:        ICD-10-CM ICD-9-CM   1. Wasp sting, accidental or unintentional, initial encounter T63.461A 989.5     E905.3             Sary Hendricks PA-C  09/16/17 1813

## 2017-09-16 NOTE — ED NOTES
Pt states no further needs/concerns. resp even, unlabored. No distress noted the patient AAOx3. Pt denies difficulty breathing/swallowing/handling secretions. SHEILA Hendricks aware of pt's vital signs and states ok to d/c. PA states no need to further monitor patient here in ED and ok to discharge.

## 2018-02-26 ENCOUNTER — HOSPITAL ENCOUNTER (EMERGENCY)
Facility: HOSPITAL | Age: 38
Discharge: HOME | End: 2018-02-26
Attending: EMERGENCY MEDICINE
Payer: MEDICAID

## 2018-02-26 ENCOUNTER — TELEPHONE (OUTPATIENT)
Dept: EMERGENCY MEDICINE | Facility: HOSPITAL | Age: 38
End: 2018-02-26

## 2018-02-26 VITALS
BODY MASS INDEX: 20.68 KG/M2 | TEMPERATURE: 99 F | HEART RATE: 65 BPM | RESPIRATION RATE: 16 BRPM | DIASTOLIC BLOOD PRESSURE: 87 MMHG | HEIGHT: 62 IN | WEIGHT: 112.38 LBS | OXYGEN SATURATION: 100 % | SYSTOLIC BLOOD PRESSURE: 175 MMHG

## 2018-02-26 DIAGNOSIS — K08.89 TOOTHACHE: Primary | ICD-10-CM

## 2018-02-26 DIAGNOSIS — K04.7 DENTAL ABSCESS: ICD-10-CM

## 2018-02-26 PROCEDURE — 99283 EMERGENCY DEPT VISIT LOW MDM: CPT

## 2018-02-26 RX ORDER — PENICILLIN V POTASSIUM 500 MG/1
500 TABLET, FILM COATED ORAL 4 TIMES DAILY
Qty: 40 TABLET | Refills: 0 | Status: SHIPPED | OUTPATIENT
Start: 2018-02-26 | End: 2018-03-05

## 2018-02-26 NOTE — ED PROVIDER NOTES
Encounter Date: 2/26/2018       History     Chief Complaint   Patient presents with    Dental Pain     right lower dental pain, pt states seen at ER in [facility] on Jan 1 for same tooth, states unable to find dentist to pull tooth      The history is provided by the patient.   Dental Pain   The primary symptoms include mouth pain. Primary symptoms do not include fever, shortness of breath or sore throat. The symptoms began several days ago. The symptoms are unchanged. The symptoms are recurrent. The symptoms occur constantly.   Mouth pain occurs constantly. Affected locations include: teeth.   Additional symptoms include: gum swelling and gum tenderness. Additional symptoms do not include: trismus, trouble swallowing, ear pain and hearing loss.     Review of patient's allergies indicates:  No Known Allergies  Past Medical History:   Diagnosis Date    Anxiety     Cerebral aneurysm rupture     Chronic headache     Depression     Stroke      Past Surgical History:   Procedure Laterality Date    BRAIN SURGERY      CHOLECYSTECTOMY      CRANIOTOMY      HERNIA REPAIR      HYSTERECTOMY       Family History   Problem Relation Age of Onset    No Known Problems Mother     Arthritis Father      Social History   Substance Use Topics    Smoking status: Current Every Day Smoker     Packs/day: 0.50     Types: Cigarettes    Smokeless tobacco: Never Used    Alcohol use Yes      Comment: occasional     Review of Systems   Constitutional: Negative for fever.   HENT: Negative for ear pain, hearing loss, sore throat and trouble swallowing.    Respiratory: Negative for shortness of breath.    Cardiovascular: Negative for chest pain.   Gastrointestinal: Negative for nausea.   Genitourinary: Negative for dysuria.   Musculoskeletal: Negative for back pain.   Skin: Negative for rash.   Neurological: Negative for weakness.   Hematological: Does not bruise/bleed easily.       Physical Exam     Initial Vitals [02/26/18 1307]   BP  Pulse Resp Temp SpO2   (!) 175/87 65 16 98.5 °F (36.9 °C) 100 %      MAP       116.33         Physical Exam    Nursing note and vitals reviewed.  Constitutional: She appears well-developed and well-nourished. No distress.   HENT:   Head: Normocephalic and atraumatic.   Mouth/Throat: Oropharynx is clear and moist.       Multiple missing teeth, poor dentition.     Eyes: Conjunctivae and EOM are normal. Pupils are equal, round, and reactive to light.   Neck: Normal range of motion. Neck supple.   Cardiovascular: Normal rate, regular rhythm and normal heart sounds. Exam reveals no gallop and no friction rub.    No murmur heard.  Pulmonary/Chest: Breath sounds normal. No respiratory distress. She has no wheezes. She has no rhonchi. She has no rales.   Abdominal: Soft. Bowel sounds are normal. She exhibits no distension and no mass. There is no tenderness. There is no rebound and no guarding.   Musculoskeletal: Normal range of motion. She exhibits no edema or tenderness.   Neurological: She is alert and oriented to person, place, and time. She has normal strength.   Skin: Skin is warm and dry. No rash noted.   Psychiatric: She has a normal mood and affect. Thought content normal.         ED Course   Procedures  Labs Reviewed - No data to display                               Clinical Impression:       ICD-10-CM ICD-9-CM   1. Toothache K08.89 525.9   2. Dental abscess K04.7 522.5         Disposition:   Disposition: Discharged  Condition: Stable                        Tavo Osorio MD  02/26/18 9692

## 2018-08-30 ENCOUNTER — HOSPITAL ENCOUNTER (EMERGENCY)
Facility: HOSPITAL | Age: 38
Discharge: HOME | End: 2018-08-30
Attending: EMERGENCY MEDICINE
Payer: MEDICAID

## 2018-08-30 VITALS
RESPIRATION RATE: 18 BRPM | HEART RATE: 84 BPM | BODY MASS INDEX: 24.84 KG/M2 | WEIGHT: 135.81 LBS | SYSTOLIC BLOOD PRESSURE: 147 MMHG | OXYGEN SATURATION: 100 % | TEMPERATURE: 98 F | DIASTOLIC BLOOD PRESSURE: 89 MMHG

## 2018-08-30 DIAGNOSIS — R03.0 ELEVATED BLOOD PRESSURE READING: ICD-10-CM

## 2018-08-30 DIAGNOSIS — Z72.0 TOBACCO USE: Primary | ICD-10-CM

## 2018-08-30 DIAGNOSIS — R51.9 HEADACHE: ICD-10-CM

## 2018-08-30 PROCEDURE — 96374 THER/PROPH/DIAG INJ IV PUSH: CPT

## 2018-08-30 PROCEDURE — 99284 EMERGENCY DEPT VISIT MOD MDM: CPT | Mod: 25

## 2018-08-30 PROCEDURE — 96375 TX/PRO/DX INJ NEW DRUG ADDON: CPT | Mod: 59

## 2018-08-30 PROCEDURE — 25000003 PHARM REV CODE 250: Performed by: EMERGENCY MEDICINE

## 2018-08-30 PROCEDURE — 96361 HYDRATE IV INFUSION ADD-ON: CPT

## 2018-08-30 PROCEDURE — 63600175 PHARM REV CODE 636 W HCPCS: Performed by: EMERGENCY MEDICINE

## 2018-08-30 RX ORDER — HYDROMORPHONE HYDROCHLORIDE 2 MG/ML
1 INJECTION, SOLUTION INTRAMUSCULAR; INTRAVENOUS; SUBCUTANEOUS
Status: COMPLETED | OUTPATIENT
Start: 2018-08-30 | End: 2018-08-30

## 2018-08-30 RX ORDER — BUTALBITAL, ACETAMINOPHEN AND CAFFEINE 50; 325; 40 MG/1; MG/1; MG/1
1 TABLET ORAL EVERY 6 HOURS PRN
Qty: 16 TABLET | Refills: 0 | Status: SHIPPED | OUTPATIENT
Start: 2018-08-30 | End: 2018-09-29

## 2018-08-30 RX ORDER — METOCLOPRAMIDE HYDROCHLORIDE 5 MG/ML
10 INJECTION INTRAMUSCULAR; INTRAVENOUS
Status: COMPLETED | OUTPATIENT
Start: 2018-08-30 | End: 2018-08-30

## 2018-08-30 RX ADMIN — SODIUM CHLORIDE 1000 ML: 0.9 INJECTION, SOLUTION INTRAVENOUS at 01:08

## 2018-08-30 RX ADMIN — METOCLOPRAMIDE 10 MG: 5 INJECTION, SOLUTION INTRAMUSCULAR; INTRAVENOUS at 01:08

## 2018-08-30 RX ADMIN — HYDROMORPHONE HYDROCHLORIDE 1 MG: 2 INJECTION, SOLUTION INTRAMUSCULAR; INTRAVENOUS; SUBCUTANEOUS at 01:08

## 2018-08-30 NOTE — ED NOTES
Pt c/o right sided headache for a few days, associated with nausea. Has tried OTC medications with no improvement.

## 2018-08-30 NOTE — ED NOTES
Pt complains of  Headache. Onset for 3 days.     Level of Consciousness: The patient is  awake, alert, and oriented to person, place and time. Pts affect is appropriate, speech is appropriate.   Appearance: Pt is resting in table, no distress is noted. Clothing and hygiene are appropriate for age and situation.   Skin: Skin is W/D/I. Normal skin turgor. Mucous membranes are moist. Skin color normal.  No deformities noted.  Musculoskeletal: Moves all extremities with no difficulty. full range of motion. No obvious deformities noted. Pt ambulates independently.   Respiratory: Airway open and patent. Respiration rate even and unlabored. No use of accessory muscles noted. Breath sounds clear to ausculation.   Cardiac: Regular rate and rhythm. Denies chest pain at this time. No peripheral edema noted. Peripheral pulses palpated. Capillary refill brisk.   Abdomen: No abdominal distension noted. Abdomin soft and non-tender to palpation. Active bowel sounds in all four quadrants.   Neurologic: Symmetrical expression noted in face. Hand grasps equal bilaterally. Normal sensation reported in all extremities. No obvious neurological deficits noted. +HA. Denies numb ting. States this headache is worse than her usual.   Pain: c/o 10/10 pain.       Pt made aware of plan of care, verbalizes understanding and denies any questions at this time. Pt resting on table. Physician notified of pain.  Will continue to monitor patient.

## 2018-08-30 NOTE — ED PROVIDER NOTES
Encounter Date: 8/30/2018       History     Chief Complaint   Patient presents with    Headache     for 3 days, no OTC medications are working. hx of brain aneurysm/brain surgery     CHIEF COMPLIANT: Headache (for 3 days, no OTC medications are working. hx of brain aneurysm/brain surgery)      8/30/2018, 12:02 PM     The history is provided by the patient. Isabelle Bonds is a 38 y.o. female presenting to the ED for right-sided headache.  Patient reportedly has a history of cerebral aneurysm rupture requiring ventriculostomy.  Patient complains of right-sided headache that was gradual in onset.  The pain is described as throbbing.  It is located behind the right eye.  Pain started after she had walked into a dark kitchen and then bent over to stock the refrigerator with water.  She reports that she hit her forehead on a chair.  She states that that is when the pain had started.  It was gradual in onset and worsened afterwards.  Pain is rated 6/10.  It is not the worse headache that she has experienced.  There is no loss of consciousness.  There is no use of anticoagulants.  Patient reports that she has been having a persistent headache for the last 3 days.  It is associated with nausea.  She states that yesterday she walked into the bathroom where she slipped landing on her back and hitting the back of her head.  Again she denies loss of consciousness, vomiting, neck pain.   She denies any numbness or tingling to the extremities, anticoagulant use, photophobia, loss of control of bowel or bladder, or sudden onset of headache. Patient denies any recent fever, rhinorrhea, recent antibiotics, sinus issues, dental pain.    PCP: VIVIANA Weaver  Specialist:               Review of patient's allergies indicates:  No Known Allergies  Past Medical History:   Diagnosis Date    Anxiety     Cerebral aneurysm rupture     Chronic headache     Depression     Stroke      Past Surgical History:   Procedure Laterality  Subjective:      History was provided by the patient and mother and patient was brought in for weight issues  .    History of Present Illness:         Tre presents today for an ADHD medication check and concern over appetite.  He has been taking Adderall XR 20 mg for several years.  Mom has noticed a significant, steady decrease in his appetite over the past two months.  He has been weaker and more tired than usual.   He is sleeping well.  He takes 2 mg of melatonin at night.  No headache or stomach.  No chest pain.  No dizziness.  Mom feels like he has started to bruise more easily.  No gingival bleeding.  No blood in the urine or stool.  No excessive thirst.  No enuresis.  No vomiting or nausea.  No diarrhea.  His appetite is especially poor during the day.  He does not take his ADHD medication on weekends or over the Summer break.    Diet:    1-2 [place] Instant Breakfast per day, 2 egg muffins  Peanut butter cracker (1-2 crackers out of pack), 1 granula bar, water  Grapes or a fiber bar for snack  Protein, vegetable, and carb for dinner (chicken, turkey meatballs, broccoli or corn, bread or pasta) - eats 75%     Response to medication: Good  School: 5th grade [employer]  Grades: A's and B's, 1 C  Discipline: Good  Side effects: Poor Appetite  Mood: Good  Headache: None  Abdominal pain: None  Appetite: Poor  Weight loss: Weight stable  Insomnia: None  OCD: None  Tics: None  Chest pain: None  Irregular/Skipped heart beats: None    HPI    Review of Systems   Constitutional: Positive for activity change, appetite change, fatigue and unexpected weight change. Negative for chills, diaphoresis and fever.   HENT: Negative for mouth sores and nosebleeds.    Eyes: Negative for visual disturbance.   Respiratory: Negative for shortness of breath.    Cardiovascular: Negative for chest pain, palpitations and leg swelling.   Gastrointestinal: Negative for abdominal pain, blood in stool, constipation, diarrhea, nausea and  Date    BRAIN SURGERY      CHOLECYSTECTOMY      CRANIOTOMY      HERNIA REPAIR      HYSTERECTOMY       Family History   Problem Relation Age of Onset    No Known Problems Mother     Arthritis Father      Social History     Tobacco Use    Smoking status: Current Every Day Smoker     Packs/day: 0.50     Types: Cigarettes    Smokeless tobacco: Never Used   Substance Use Topics    Alcohol use: Yes     Comment: occasional    Drug use: No     Review of Systems   Constitutional: Negative for fever.   HENT: Negative for congestion, postnasal drip, rhinorrhea, sinus pressure, sinus pain and sore throat.    Eyes: Negative for photophobia and visual disturbance.   Respiratory: Negative for shortness of breath.    Cardiovascular: Negative for chest pain.   Gastrointestinal: Positive for nausea. Negative for vomiting.   Genitourinary: Negative for dysuria.   Musculoskeletal: Negative for back pain, neck pain and neck stiffness.   Skin: Negative for rash.   Neurological: Positive for headaches. Negative for seizures, speech difficulty, weakness, light-headedness and numbness.   Hematological: Does not bruise/bleed easily.       Physical Exam     Initial Vitals [08/30/18 1129]   BP Pulse Resp Temp SpO2   135/83 82 18 98.2 °F (36.8 °C) 97 %      MAP       --         Vitals:    08/30/18 1129   BP: 135/83   Pulse: 82   Resp: 18   Temp: 98.2 °F (36.8 °C)   TempSrc: Oral   SpO2: 97%   Weight: 61.6 kg (135 lb 12.9 oz)       Physical Exam  Nursing Notes and Vital Signs Reviewed.  Constitutional:  Well developed, well nourished.  She is awake & alert.  She is in no apparent distress.  Patient is smiling.  Head:  Atraumatic.  Normocephalic.    Eyes:  PERRL.  EOMI.  Conjunctivae are not pale. No scleral icterus.  ENT:  Mucous membranes are moist and intact.  Oropharynx is clear and symmetric.  tympanic membranes normal in appearance.  Nasal mucosa normal in appearance  Neck:  Supple. Full ROM.  No lymphadenopathy.  No  vomiting.   Endocrine: Negative for cold intolerance, heat intolerance, polydipsia, polyphagia and polyuria.   Genitourinary: Negative for decreased urine volume, difficulty urinating, dysuria, enuresis and hematuria.   Musculoskeletal: Negative for back pain and myalgias.   Skin: Positive for pallor (seems pale to mom). Negative for rash.   Allergic/Immunologic: Negative for food allergies.   Neurological: Negative for seizures, syncope, weakness and headaches.   Hematological: Negative for adenopathy. Bruises/bleeds easily (bruises easily per mom, no abnormal bleeding).   Psychiatric/Behavioral: Positive for decreased concentration (improved on Adderrall). Negative for behavioral problems, dysphoric mood and sleep disturbance. The patient is hyperactive (improved on Adderall).        Objective:     Physical Exam   Constitutional: Vital signs are normal. He appears well-developed.  Non-toxic appearance. He does not appear ill. No distress.   HENT:   Right Ear: Tympanic membrane normal.   Left Ear: Tympanic membrane normal.   Nose: Nose normal.   Mouth/Throat: Mucous membranes are moist. Oropharynx is clear. Pharynx is normal.   Eyes: Conjunctivae and EOM are normal. Pupils are equal, round, and reactive to light.   Neck: Normal range of motion. Neck supple. No rigidity or adenopathy.   Cardiovascular: Normal rate, regular rhythm, S1 normal and S2 normal.  Pulses are palpable.    No murmur heard.  Pulses:       Radial pulses are 2+ on the right side, and 2+ on the left side.   Pulmonary/Chest: Effort normal and breath sounds normal. There is normal air entry. No stridor. No respiratory distress. Air movement is not decreased. He has no wheezes. He has no rhonchi. He has no rales. He exhibits no retraction.   Abdominal: Soft. Bowel sounds are normal. He exhibits no distension. There is no hepatosplenomegaly. There is no tenderness.   Musculoskeletal: Normal range of motion. He exhibits no deformity or signs of  injury.   Lymphadenopathy: No anterior cervical adenopathy or posterior cervical adenopathy. No occipital adenopathy is present.     He has no cervical adenopathy.   Neurological: He is alert. He has normal reflexes. No cranial nerve deficit. He exhibits normal muscle tone.   Skin: Skin is warm. Capillary refill takes less than 3 seconds. Bruising (fading bruise to left cheek (hit with a pack of [place] beads at a parade)) noted. No petechiae and no rash noted. He is not diaphoretic. No cyanosis. No jaundice or pallor.   Fair skin, allergic shiners   Nursing note and vitals reviewed.      Assessment:        1. Weight loss, non-intentional    2. Fatigue, unspecified type    3. ADHD (attention deficit hyperactivity disorder), combined type    4. Poor appetite         Plan:   1. Weight loss, non-intentional  - 1.6 kg weight loss over the past 4 months  - CBC auto differential; Future  - Comprehensive metabolic panel; Future  - Hemoglobin A1c; Future  - TSH; Future  - T4, FREE; Future  - Urinalysis  - Urine culture  - TISSUE TRANSGLUTAMINASE, IGA; Future  - cyproheptadine (PERIACTIN) 4 mg tablet; Take 1 tablet (4 mg total) by mouth 2 (two) times daily.  Dispense: 60 tablet; Refill: 0    2. Fatigue, unspecified type  - CBC auto differential; Future  - TSH; Future  - T4, FREE; Future  - Heterophile Ab Screen; Future  - GURPREET-BARR VIRUS ANTIBODY PANEL; Future    3. ADHD (attention deficit hyperactivity disorder), combined type  - will continue Adderall XR 20 mg at this time, will re-evaluate in 2 weeks if appetite still poor  - cyproheptadine (PERIACTIN) 4 mg tablet; Take 1 tablet (4 mg total) by mouth 2 (two) times daily.  Dispense: 60 tablet; Refill: 0    4. Poor appetite  - TISSUE TRANSGLUTAMINASE, IGA; Future  - cyproheptadine (PERIACTIN) 4 mg tablet; Take 1 tablet (4 mg total) by mouth 2 (two) times daily.  Dispense: 60 tablet; Refill: 0     CBC reassuring with normal H/H, normal urinalysis.  Will start Periactin  meningismus.  Cardiovascular:  Regular rate. Regular rhythm. S1 and S2 heart sounds present.  No murmurs, rubs, or gallops.  Distal pulses are 2+ and symmetric.  Pulmonary/Chest:  No evidence of respiratory distress.  Clear to auscultation bilaterally.  No wheezing, rales or rhonchi.  Abdominal:  Soft and non-distended.  There is no tenderness.  No rebound, guarding, or rigidity.  Good bowel sounds.  No organomegaly    Genitourinary: No CVA tenderness.  Musculoskeletal:  Moves all four extremities. No obvious deformities.  No edema. No calf tenderness.    Skin:  Skin is warm and dry. No rashes.      Neurological:  Alert, awake, and appropriate.  Normal speech.  No acute focal neurological deficits are appreciated. Cranial nerves 2-12 intact.  GCS 15.  No pronator drift.  NIH stroke scale 0.  Psychiatric:  Good eye contact.  Appropriate in content/context. Normal affect.    ED Course   Procedures  Labs Reviewed - No data to display       Imaging Results    None           Imaging Results          CT Head Without Contrast (Final result)  Result time 08/30/18 13:00:26    Final result by Julio C Mcmahan MD (08/30/18 13:00:26)                 Impression:      No acute abnormality.    All CT scans at this facility use dose modulation, iterative reconstruction, and/or weight based dosing when appropriate to reduce radiation dose to as low as reasonable achievable.      Electronically signed by: Julio C Mcmahan MD  Date:    08/30/2018  Time:    13:00             Narrative:    EXAMINATION:  CT HEAD WITHOUT CONTRAST    CLINICAL HISTORY:  headache - hx of cerebral aneurysm; Headache    TECHNIQUE:  Low dose axial CT images obtained throughout the head without intravenous contrast. Sagittal and coronal reconstructions were performed.    All CT scans at this facility use dose modulation, iterative reconstruction, and/or weight based dosing when appropriate to reduce radiation dose to as low as reasonable  achievable.    COMPARISON:  08/10/2017.    FINDINGS:  Intracranial compartment: Right parietal craniotomy with encephalomalacia involving the right temporal parietal lobe.  Dystrophic calcifications are noted.  No hemorrhage is seen.  Remaining parenchyma appears normal.  No evidence of intracranial hemorrhage.  No mass effect.    Ventricles and sulci are normal in size for age without evidence of hydrocephalus.    No extra-axial blood or fluid collections.    Skull/extracranial contents (limited evaluation): No fracture. Mastoid air cells and paranasal sinuses are essentially clear.                                   Medications   hydromorphone (PF) injection 1 mg (1 mg Intravenous Given 8/30/18 1300)   metoclopramide HCl injection 10 mg (10 mg Intravenous Given 8/30/18 1302)   sodium chloride 0.9% bolus 1,000 mL (1,000 mLs Intravenous New Bag 8/30/18 1303)       2:51 PM Reassessment: Dr. Blevins reassessed the pt.  The pt is resting comfortably and is NAD.  Pt states their sx have improved. Discussed test results, shared treatment plan, specific conditions for return, and the need for f/u.  Answered their questions at this time.  Pt understands and agrees to the plan.  The pt has remained hemodynamically stable through ED course and is stable for discharge.    Follow-up Information     VIVIANA Weaver In 2 days.    Specialty:  Family Medicine  Why:  Stop smoking.  Return to emergency department for severe headache, numbness or weakness to 1 side, slurred speech, confusion, severe neck pain, or other concerns.  Contact information:  [id number] [id number] [phone]                   New Prescriptions    BUTALBITAL-ACETAMINOPHEN-CAFFEINE -40 MG (FIORICET, ESGIC) -40 MG PER TABLET    Take 1 tablet by mouth every 6 (six) hours as needed for Pain.        Discontinued Medications    No medications on file       I discussed with patient and/or family/caretaker that evaluation in  twice daily, start Pediasure two per day, increase caloric intake.  Will update mom with lab results.  F/u in 2 weeks for weight check.    Patient Instructions   -Start Pediasure with goal of two per day.  -Start a multivitamin once daily.  -Start Periactin twice daily.  -Follow-up in 2 weeks for weight check.       the ED does not suggest any emergent or life threatening medical conditions requiring immediate intervention beyond what was provided in the ED, and I believe patient is safe for discharge.  Regardless, an unremarkable evaluation in the ED does not preclude the development or presence of a serious of life threatening condition. As such, patient was instructed to return immediately for any worsening or change in current symptoms.    Pre-hypertension/Hypertension: The pt has been informed that they may have pre-hypertension or hypertension based on a blood pressure reading in the ED. I recommend that the pt call the PCP listed on their discharge instructions or a physician of their choice this week to arrange f/u for further evaluation of possible pre-hypertension or hypertension.    Medical Decision Making:   Initial Assessment:   Patient is a 38-year-old female presenting to the emergency room with right-sided throbbing headache after bumping her head on a chair.  There was no loss of consciousness with a headache.  Patient does have prior history of subarachnoid hemorrhage resulting in ventriculostomy.  The headache was not sudden or severe in onset.  It was not associated with loss of consciousness, was not associated with slurred speech, photophobia, neck pain, numbness or weakness to 1 side, or other neurologic symptoms.  Patient has had multiple visits to the emergency room for chronic headaches.  Differential Diagnosis:   Migraine headache, post concussive syndrome, stress headache, less likely subarachnoid hemorrhage.  Clinical Tests:   Radiological Study: Ordered and Reviewed  ED Management:  Patient had IV established.  Patient was given anti emetic, her chronic pain medicine, and fluids.  As patient does have prior history of subarachnoid bleed, and 2 episodes of trauma with hitting her head within last 2 days, head CT was ordered.  CT head CT did not show any evidence of subarachnoid bleed.  The patient  was counseled on tobacco smoking.  Patient did not have any worrisome signs such as the most severe headache sudden headache, vomiting, or neurologic symptoms consistent with subarachnoid hemorrhage.  Therefore lumbar puncture was not indicated in this patient.  Patient felt better after treatment in the emergency room.  Patient remained neurologically intact.  Discharged home.    Additional MDM:   Smoking Cessation: The patient is a smoker. The patient was counseled on smoking cessation for: 3 minutes. The patient was counseled on tobacco related  health complications. Appropriate patient literature was given to the patient concerning tobacco cessation.                    Clinical Impression:       ICD-10-CM ICD-9-CM   1. Tobacco use Z72.0 305.1   2. Headache R51 784.0   3. Elevated blood pressure reading R03.0 796.2   \        Disposition:   Disposition: Discharged  Condition: Stable                        Sammi Blevins, DO  08/30/18 1924       Sammi Blevins,   08/30/18 1924

## 2018-12-01 ENCOUNTER — HOSPITAL ENCOUNTER (EMERGENCY)
Facility: HOSPITAL | Age: 38
Discharge: HOME | End: 2018-12-01
Attending: EMERGENCY MEDICINE
Payer: MEDICAID

## 2018-12-01 VITALS
WEIGHT: 134.25 LBS | DIASTOLIC BLOOD PRESSURE: 86 MMHG | HEIGHT: 62 IN | OXYGEN SATURATION: 98 % | RESPIRATION RATE: 19 BRPM | TEMPERATURE: 99 F | SYSTOLIC BLOOD PRESSURE: 157 MMHG | BODY MASS INDEX: 24.7 KG/M2 | HEART RATE: 108 BPM

## 2018-12-01 DIAGNOSIS — T85.848A DENTAL IMPLANT PAIN, INITIAL ENCOUNTER: Primary | ICD-10-CM

## 2018-12-01 PROCEDURE — 99283 EMERGENCY DEPT VISIT LOW MDM: CPT

## 2018-12-01 RX ORDER — FOLIC ACID 1 MG/1
1000 TABLET ORAL DAILY
Refills: 1 | COMMUNITY
Start: 2018-08-28 | End: 2023-10-19 | Stop reason: SDUPTHER

## 2018-12-01 RX ORDER — NAPROXEN 500 MG/1
500 TABLET ORAL 2 TIMES DAILY WITH MEALS
Qty: 60 TABLET | Refills: 0 | Status: SHIPPED | OUTPATIENT
Start: 2018-12-01 | End: 2019-11-07 | Stop reason: ALTCHOICE

## 2018-12-01 RX ORDER — LEVETIRACETAM 500 MG/1
500 TABLET ORAL 2 TIMES DAILY
COMMUNITY
End: 2019-09-03

## 2018-12-01 RX ORDER — CLONAZEPAM 0.5 MG/1
0.5 TABLET ORAL 2 TIMES DAILY PRN
COMMUNITY
End: 2018-12-01

## 2018-12-01 RX ORDER — AMOXICILLIN AND CLAVULANATE POTASSIUM 875; 125 MG/1; MG/1
1 TABLET, FILM COATED ORAL 2 TIMES DAILY
Qty: 14 TABLET | Refills: 0 | Status: SHIPPED | OUTPATIENT
Start: 2018-12-01 | End: 2019-09-03

## 2018-12-01 RX ORDER — LOVASTATIN 10 MG/1
TABLET ORAL
Refills: 3 | COMMUNITY
Start: 2018-08-28 | End: 2019-09-03

## 2018-12-01 NOTE — ED PROVIDER NOTES
Encounter Date: 12/1/2018       History     Chief Complaint   Patient presents with    Dental Pain     lower R x 3 days.     The history is provided by the patient.   Dental Pain   The primary symptoms include mouth pain. Dental injury:  Primary symptoms comment: right fron tooth cracked for 1 year pain over last 3 days . The symptoms began several days ago. The symptoms are worsening. The symptoms are recurrent. The symptoms occur intermittently (usama states she had left over PENicillin that she started taking ).   Mouth pain began more than 1 month ago. Mouth pain occurs intermittently. Mouth pain is worsening. Affected locations include: teeth. At its highest the mouth pain was at 10/10. The mouth pain is currently at 9/10.   Additional symptoms include: gum tenderness. Additional symptoms do not include: dental sensitivity to temperature, gum swelling, purulent gums, trismus, jaw pain, facial swelling, trouble swallowing, pain with swallowing, excessive salivation, dry mouth, taste disturbance, smell disturbance, drooling, ear pain, hearing loss, nosebleeds, swollen glands, goiter and fatigue. Medical issues do not include: alcohol problem, smoking, chewing tobacco, immunosuppression, periodontal disease and cancer.     Review of patient's allergies indicates:  No Known Allergies  Past Medical History:   Diagnosis Date    Anxiety     Cerebral aneurysm rupture     Chronic headache     Depression     Stroke      Past Surgical History:   Procedure Laterality Date    BRAIN SURGERY      CHOLECYSTECTOMY      CRANIOTOMY      HERNIA REPAIR      HYSTERECTOMY       Family History   Problem Relation Age of Onset    No Known Problems Mother     Arthritis Father      Social History     Tobacco Use    Smoking status: Current Every Day Smoker     Packs/day: 0.50     Types: Cigarettes    Smokeless tobacco: Never Used   Substance Use Topics    Alcohol use: Yes     Comment: occasional    Drug use: No      Review of Systems   Constitutional: Negative for fatigue.   HENT: Positive for dental problem. Negative for drooling, ear pain, facial swelling, hearing loss, nosebleeds and trouble swallowing.    All other systems reviewed and are negative.      Physical Exam     Initial Vitals [12/01/18 1653]   BP Pulse Resp Temp SpO2   (!) 157/86 108 19 98.5 °F (36.9 °C) 98 %      MAP       --         Physical Exam    Nursing note and vitals reviewed.  Constitutional: She appears well-developed and well-nourished.   HENT:   Head: Normocephalic and atraumatic.   Mouth/Throat: No oropharyngeal exudate.       Tooth cracked and discolored, no gum erythema, no fluctuation, poor dentation   Eyes: Conjunctivae, EOM and lids are normal. Pupils are equal, round, and reactive to light. Lids are everted and swept, no foreign bodies found.   Neck: Trachea normal, normal range of motion, full passive range of motion without pain and phonation normal. Neck supple.   Cardiovascular: Normal rate, regular rhythm, S1 normal, S2 normal, normal heart sounds, intact distal pulses and normal pulses.   Pulmonary/Chest: Effort normal and breath sounds normal. No respiratory distress.   Abdominal: Soft. Bowel sounds are normal.   Lymphadenopathy:     She has no cervical adenopathy.     She has no axillary adenopathy.   Neurological: She is alert and oriented to person, place, and time. She has normal strength and normal reflexes. No cranial nerve deficit or sensory deficit. She displays a negative Romberg sign.   Skin: Skin is warm and dry. Capillary refill takes less than 2 seconds.         ED Course   Procedures  Labs Reviewed - No data to display       Imaging Results    None                          All historical, clinical, radiographic, and laboratory findings were reviewed with the patient in detail.  Findings are consistent with a diagnosis of dental pain .  All remaining questions and concerns were addressed at that time.  Patient has been  counseled regarding the need for follow-up as well as the indication to return to the emergency room should new or worrisome developments occur.         Clinical Impression:   The encounter diagnosis was Dental implant pain, initial encounter.                               Stuart Casarez NP  12/01/18 [id number]

## 2018-12-03 ENCOUNTER — HOSPITAL ENCOUNTER (EMERGENCY)
Facility: HOSPITAL | Age: 38
Discharge: HOME | End: 2018-12-03
Attending: EMERGENCY MEDICINE
Payer: MEDICAID

## 2018-12-03 VITALS
TEMPERATURE: 98 F | SYSTOLIC BLOOD PRESSURE: 140 MMHG | DIASTOLIC BLOOD PRESSURE: 87 MMHG | RESPIRATION RATE: 18 BRPM | WEIGHT: 134 LBS | BODY MASS INDEX: 24.66 KG/M2 | HEART RATE: 94 BPM | HEIGHT: 62 IN | OXYGEN SATURATION: 97 %

## 2018-12-03 DIAGNOSIS — R51.9 NONINTRACTABLE HEADACHE, UNSPECIFIED CHRONICITY PATTERN, UNSPECIFIED HEADACHE TYPE: Primary | ICD-10-CM

## 2018-12-03 LAB
ALBUMIN SERPL BCP-MCNC: 4.6 G/DL
ALP SERPL-CCNC: 77 U/L
ALT SERPL W/O P-5'-P-CCNC: 15 U/L
ANION GAP SERPL CALC-SCNC: 9 MMOL/L
AST SERPL-CCNC: 14 U/L
BASOPHILS # BLD AUTO: 0.01 K/UL
BASOPHILS NFR BLD: 0.2 %
BILIRUB SERPL-MCNC: 0.7 MG/DL
BUN SERPL-MCNC: 13 MG/DL
CALCIUM SERPL-MCNC: 9.8 MG/DL
CHLORIDE SERPL-SCNC: 106 MMOL/L
CO2 SERPL-SCNC: 24 MMOL/L
CREAT SERPL-MCNC: 0.9 MG/DL
DIFFERENTIAL METHOD: ABNORMAL
EOSINOPHIL # BLD AUTO: 0.1 K/UL
EOSINOPHIL NFR BLD: 2.2 %
ERYTHROCYTE [DISTWIDTH] IN BLOOD BY AUTOMATED COUNT: 11.9 %
EST. GFR  (AFRICAN AMERICAN): >60 ML/MIN/1.73 M^2
EST. GFR  (NON AFRICAN AMERICAN): >60 ML/MIN/1.73 M^2
GLUCOSE SERPL-MCNC: 106 MG/DL
HCT VFR BLD AUTO: 41.9 %
HGB BLD-MCNC: 14.6 G/DL
LYMPHOCYTES # BLD AUTO: 1.4 K/UL
LYMPHOCYTES NFR BLD: 22.6 %
MCH RBC QN AUTO: 31.9 PG
MCHC RBC AUTO-ENTMCNC: 34.8 G/DL
MCV RBC AUTO: 92 FL
MONOCYTES # BLD AUTO: 0.7 K/UL
MONOCYTES NFR BLD: 10.9 %
NEUTROPHILS # BLD AUTO: 4.1 K/UL
NEUTROPHILS NFR BLD: 63.8 %
PLATELET # BLD AUTO: 193 K/UL
PMV BLD AUTO: 10.3 FL
POTASSIUM SERPL-SCNC: 3.7 MMOL/L
PROT SERPL-MCNC: 8.1 G/DL
RBC # BLD AUTO: 4.57 M/UL
SODIUM SERPL-SCNC: 139 MMOL/L
WBC # BLD AUTO: 6.34 K/UL

## 2018-12-03 PROCEDURE — 99284 EMERGENCY DEPT VISIT MOD MDM: CPT | Mod: 25

## 2018-12-03 PROCEDURE — 63600175 PHARM REV CODE 636 W HCPCS: Performed by: EMERGENCY MEDICINE

## 2018-12-03 PROCEDURE — 85025 COMPLETE CBC W/AUTO DIFF WBC: CPT

## 2018-12-03 PROCEDURE — 80053 COMPREHEN METABOLIC PANEL: CPT

## 2018-12-03 PROCEDURE — 96374 THER/PROPH/DIAG INJ IV PUSH: CPT

## 2018-12-03 PROCEDURE — 96375 TX/PRO/DX INJ NEW DRUG ADDON: CPT

## 2018-12-03 RX ORDER — LORAZEPAM 2 MG/ML
INJECTION INTRAMUSCULAR
Status: DISCONTINUED
Start: 2018-12-03 | End: 2018-12-03 | Stop reason: HOSPADM

## 2018-12-03 RX ORDER — MORPHINE SULFATE 4 MG/ML
4 INJECTION, SOLUTION INTRAMUSCULAR; INTRAVENOUS
Status: COMPLETED | OUTPATIENT
Start: 2018-12-03 | End: 2018-12-03

## 2018-12-03 RX ORDER — TIZANIDINE 4 MG/1
4 TABLET ORAL EVERY 6 HOURS PRN
COMMUNITY
End: 2019-11-07 | Stop reason: CLARIF

## 2018-12-03 RX ORDER — CLONAZEPAM 1 MG/1
1 TABLET ORAL 2 TIMES DAILY PRN
COMMUNITY

## 2018-12-03 RX ORDER — HYDROMORPHONE HYDROCHLORIDE 2 MG/ML
1 INJECTION, SOLUTION INTRAMUSCULAR; INTRAVENOUS; SUBCUTANEOUS
Status: DISCONTINUED | OUTPATIENT
Start: 2018-12-03 | End: 2018-12-03

## 2018-12-03 RX ORDER — HYDROMORPHONE HYDROCHLORIDE 2 MG/ML
0.5 INJECTION, SOLUTION INTRAMUSCULAR; INTRAVENOUS; SUBCUTANEOUS
Status: COMPLETED | OUTPATIENT
Start: 2018-12-03 | End: 2018-12-03

## 2018-12-03 RX ADMIN — LORAZEPAM 2 MG: 2 INJECTION INTRAMUSCULAR; INTRAVENOUS at 02:12

## 2018-12-03 RX ADMIN — MORPHINE SULFATE 4 MG: 4 INJECTION, SOLUTION INTRAMUSCULAR; INTRAVENOUS at 03:12

## 2018-12-03 RX ADMIN — HYDROMORPHONE HYDROCHLORIDE 0.5 MG: 2 INJECTION INTRAMUSCULAR; INTRAVENOUS; SUBCUTANEOUS at 04:12

## 2018-12-03 NOTE — ED NOTES
Patient complains of headache. Reports onset of symptoms chronic but worse  today.     Level of Consciousness: Patient is awake, alert, and oriented to person, place, time, and situation. Speech is clear.   HEENT: Symmetrical face, PERRLA, moist mucous membranes, no congestion/drainage, no JVD, pt able to swallow; pt reports posterior and right side headache   Appearance: Patient resting comfortably in bed, hygiene and clothing are both intact and appropriate.   Skin: Skin is warm, dry, and intact. Skin is of normal color, free of any skin breakdown. Mucus membranes pink and moist.   Musculoskeletal: Moves all extremities well. Full active ROM. No deformities noted. Denies any weakness. Gait steady, patient ambulates independently, without assistive device. Pt reports generalized muscle spasms   Respiratory: Airway open and patent. Respirations equal and unlabored. Lung sounds clear upon auscultation. Patient denies any SOB.   Cardiac: Regular rate and rhythm. No peripheral edema noted to bilateral lower extremities. Denies any chest pain or discomfort.   GI: Abdomen soft, non-tender. Bowel sounds present and active in all quadrants x 4. No distention noted. Denies any nausea or vomiting.   : Denies any problem with urination. Denies any problem with bowel movement.   Neurological: Normal sensation reported to all extremities. Symmetrical expressions noted to face. No obvious neurological deficits noted.   Psychosocial: Patient is calm and cooperative, appropriate to situation. Family is involved in patient care.     Patient informed of plan of care, verbalizes understanding, and has no questions or concern at this time. Bed is in the lowest position and locked. Call bell within reach of the patient. Will continue to monitor.

## 2018-12-03 NOTE — ED PROVIDER NOTES
"Encounter Date: 12/3/2018       History     Chief Complaint   Patient presents with    Headache     pt states "had muscle spasms" recently but back on keppra      The history is provided by the patient.   Headache    This is a chronic problem. The current episode started in the past 7 days. The problem occurs constantly. The problem has been unchanged. The pain is located in the bilateral region. The pain does not radiate. The pain quality is similar to prior headaches. The quality of the pain is described as aching. Pertinent negatives include no abdominal pain, back pain, coughing, dizziness, fever, nausea, neck pain, numbness, rhinorrhea, seizures, sore throat, vomiting or weakness. The treatment provided no relief.     Review of patient's allergies indicates:  No Known Allergies  Past Medical History:   Diagnosis Date    Anxiety     Cerebral aneurysm rupture     Chronic headache     Depression     Stroke      Past Surgical History:   Procedure Laterality Date    BRAIN SURGERY      CHOLECYSTECTOMY      CRANIOTOMY      HERNIA REPAIR      HYSTERECTOMY       Family History   Problem Relation Age of Onset    No Known Problems Mother     Arthritis Father      Social History     Tobacco Use    Smoking status: Current Every Day Smoker     Packs/day: 0.50     Types: Cigarettes    Smokeless tobacco: Never Used   Substance Use Topics    Alcohol use: Yes     Comment: occasional    Drug use: No     Review of Systems   Constitutional: Negative for chills, diaphoresis and fever.   HENT: Negative.  Negative for congestion, drooling, rhinorrhea and sore throat.    Eyes: Negative.  Negative for discharge and itching.   Respiratory: Negative.  Negative for cough, shortness of breath and wheezing.    Cardiovascular: Negative for chest pain, palpitations and leg swelling.   Gastrointestinal: Negative for abdominal pain, constipation, diarrhea, nausea and vomiting.   Genitourinary: Negative for difficulty urinating " and dysuria.   Musculoskeletal: Negative for arthralgias, back pain, gait problem, neck pain and neck stiffness.   Skin: Negative for color change, pallor and rash.   Neurological: Positive for headaches. Negative for dizziness, seizures, weakness and numbness.   Hematological: Does not bruise/bleed easily.   Psychiatric/Behavioral: Negative for confusion and decreased concentration.   All other systems reviewed and are negative.      Physical Exam     Initial Vitals [12/03/18 1446]   BP Pulse Resp Temp SpO2   120/80 97 20 98.2 °F (36.8 °C) 100 %      MAP       --         Physical Exam    Nursing note and vitals reviewed.  Constitutional: She appears well-developed and well-nourished. No distress.   HENT:   Head: Normocephalic and atraumatic.   Mouth/Throat: Oropharynx is clear and moist.   Eyes: Conjunctivae and EOM are normal. Pupils are equal, round, and reactive to light.   Neck: Normal range of motion. Neck supple.   Cardiovascular: Normal rate, regular rhythm and normal heart sounds. Exam reveals no gallop and no friction rub.    No murmur heard.  Pulmonary/Chest: Breath sounds normal. No respiratory distress. She has no wheezes. She has no rhonchi. She has no rales.   Abdominal: Soft. Bowel sounds are normal. She exhibits no distension and no mass. There is no tenderness. There is no rebound and no guarding.   Musculoskeletal: Normal range of motion. She exhibits no edema or tenderness.   Neurological: She is alert and oriented to person, place, and time. She has normal strength.   Skin: Skin is warm and dry. No rash noted.   Psychiatric: She has a normal mood and affect. Thought content normal.         ED Course   Procedures  Labs Reviewed   CBC W/ AUTO DIFFERENTIAL - Abnormal; Notable for the following components:       Result Value    MCH 31.9 (*)     All other components within normal limits   COMPREHENSIVE METABOLIC PANEL   URINALYSIS, REFLEX TO URINE CULTURE          Imaging Results          CT Head  "Without Contrast (Final result)  Result time 12/03/18 15:14:08    Final result by Julio C Mcmahan MD (12/03/18 15:14:08)                 Impression:      No acute abnormality.    All CT scans at this facility use dose modulation, iterative reconstruction, and/or weight based dosing when appropriate to reduce radiation dose to as low as reasonable achievable.      Electronically signed by: Julio C Mcmahan MD  Date:    12/03/2018  Time:    15:14             Narrative:    EXAMINATION:  CT HEAD WITHOUT CONTRAST    CLINICAL HISTORY:  Headache, acute, norm neuro exam;    TECHNIQUE:  Low dose axial CT images obtained throughout the head without intravenous contrast. Sagittal and coronal reconstructions were performed.    All CT scans at this facility use dose modulation, iterative reconstruction, and/or weight based dosing when appropriate to reduce radiation dose to as low as reasonable achievable.    COMPARISON:  08/30/2018.    FINDINGS:  Intracranial compartment: Right temporoparietal craniotomy.  Encephalomalacia and calcifications seen within the right temporal lobe which were seen on prior exam and appear stable.  Findings are thought to be related to postsurgical or posttraumatic abnormality.  No acute hemorrhage is identified.    The brain parenchyma appears normal. No parenchymal mass, hemorrhage, edema or major vascular distribution infarct.    Ventricles and sulci are normal in size for age without evidence of hydrocephalus.    No extra-axial blood or fluid collections.    Skull/extracranial contents (limited evaluation): No fracture.  Mild ethmoid mucosal thickening.  Mastoid air cells and paranasal sinuses are essentially clear.                                  ED Vital Signs:  Vitals:    12/03/18 1446   BP: 120/80   Pulse: 97   Resp: 20   Temp: 98.2 °F (36.8 °C)   TempSrc: Oral   SpO2: 100%   Weight: 60.8 kg (134 lb)   Height: 5' 2" (1.575 m)         Abnormal Lab Results:  Labs Reviewed   CBC W/ AUTO " DIFFERENTIAL - Abnormal; Notable for the following components:       Result Value    MCH 31.9 (*)     All other components within normal limits   COMPREHENSIVE METABOLIC PANEL   URINALYSIS, REFLEX TO URINE CULTURE          All Lab Results:  Results for orders placed or performed during the hospital encounter of 12/03/18   CBC auto differential   Result Value Ref Range    WBC 6.34 3.90 - 12.70 K/uL    RBC 4.57 4.00 - 5.40 M/uL    Hemoglobin 14.6 12.0 - 16.0 g/dL    Hematocrit 41.9 37.0 - 48.5 %    MCV 92 82 - 98 fL    MCH 31.9 (H) 27.0 - 31.0 pg    MCHC 34.8 32.0 - 36.0 g/dL    RDW 11.9 11.5 - 14.5 %    Platelets 193 150 - 350 K/uL    MPV 10.3 9.2 - 12.9 fL    Gran # (ANC) 4.1 1.8 - 7.7 K/uL    Lymph # 1.4 1.0 - 4.8 K/uL    Mono # 0.7 0.3 - 1.0 K/uL    Eos # 0.1 0.0 - 0.5 K/uL    Baso # 0.01 0.00 - 0.20 K/uL    Gran% 63.8 38.0 - 73.0 %    Lymph% 22.6 18.0 - 48.0 %    Mono% 10.9 4.0 - 15.0 %    Eosinophil% 2.2 0.0 - 8.0 %    Basophil% 0.2 0.0 - 1.9 %    Differential Method Automated    Comprehensive metabolic panel   Result Value Ref Range    Sodium 139 136 - 145 mmol/L    Potassium 3.7 3.5 - 5.1 mmol/L    Chloride 106 95 - 110 mmol/L    CO2 24 23 - 29 mmol/L    Glucose 106 70 - 110 mg/dL    BUN, Bld 13 6 - 20 mg/dL    Creatinine 0.9 0.5 - 1.4 mg/dL    Calcium 9.8 8.7 - 10.5 mg/dL    Total Protein 8.1 6.0 - 8.4 g/dL    Albumin 4.6 3.5 - 5.2 g/dL    Total Bilirubin 0.7 0.1 - 1.0 mg/dL    Alkaline Phosphatase 77 55 - 135 U/L    AST 14 10 - 40 U/L    ALT 15 10 - 44 U/L    Anion Gap 9 8 - 16 mmol/L    eGFR if African American >60.0 >60 mL/min/1.73 m^2    eGFR if non African American >60.0 >60 mL/min/1.73 m^2           Imaging Results:  Imaging Results          CT Head Without Contrast (Final result)  Result time 12/03/18 15:14:08    Final result by Julio C Mcmahan MD (12/03/18 15:14:08)                 Impression:      No acute abnormality.    All CT scans at this facility use dose modulation, iterative reconstruction,  and/or weight based dosing when appropriate to reduce radiation dose to as low as reasonable achievable.      Electronically signed by: Julio C Mcmahan MD  Date:    12/03/2018  Time:    15:14             Narrative:    EXAMINATION:  CT HEAD WITHOUT CONTRAST    CLINICAL HISTORY:  Headache, acute, norm neuro exam;    TECHNIQUE:  Low dose axial CT images obtained throughout the head without intravenous contrast. Sagittal and coronal reconstructions were performed.    All CT scans at this facility use dose modulation, iterative reconstruction, and/or weight based dosing when appropriate to reduce radiation dose to as low as reasonable achievable.    COMPARISON:  08/30/2018.    FINDINGS:  Intracranial compartment: Right temporoparietal craniotomy.  Encephalomalacia and calcifications seen within the right temporal lobe which were seen on prior exam and appear stable.  Findings are thought to be related to postsurgical or posttraumatic abnormality.  No acute hemorrhage is identified.    The brain parenchyma appears normal. No parenchymal mass, hemorrhage, edema or major vascular distribution infarct.    Ventricles and sulci are normal in size for age without evidence of hydrocephalus.    No extra-axial blood or fluid collections.    Skull/extracranial contents (limited evaluation): No fracture.  Mild ethmoid mucosal thickening.  Mastoid air cells and paranasal sinuses are essentially clear.                                   The Emergency Provider reviewed the vital signs and test results, which are outlined above.    ED Discussions:  3:51 PM: Reassessed pt at this time.  Pt states her condition has improved at this time. Discussed with pt all pertinent ED information and results. Discussed pt dx of headache and plan of tx. Gave pt all f/u and return to the ED instructions. All questions and concerns were addressed at this time. Pt expresses understanding of information and instructions, and is comfortable with plan to  discharge. Pt is stable for discharge.                                 Clinical Impression:       ICD-10-CM ICD-9-CM   1. Nonintractable headache, unspecified chronicity pattern, unspecified headache type R51 784.0           Disposition:   Disposition: Discharged  Condition: Stable                        Tavo Osorio MD  12/03/18 1557

## 2018-12-04 ENCOUNTER — HOSPITAL ENCOUNTER (EMERGENCY)
Facility: HOSPITAL | Age: 38
Discharge: HOME | End: 2018-12-04
Attending: EMERGENCY MEDICINE
Payer: MEDICAID

## 2018-12-04 VITALS
RESPIRATION RATE: 18 BRPM | TEMPERATURE: 98 F | HEART RATE: 79 BPM | OXYGEN SATURATION: 95 % | DIASTOLIC BLOOD PRESSURE: 82 MMHG | SYSTOLIC BLOOD PRESSURE: 146 MMHG

## 2018-12-04 DIAGNOSIS — Z72.0 TOBACCO USE: Primary | ICD-10-CM

## 2018-12-04 DIAGNOSIS — R51.9 HEADACHE: ICD-10-CM

## 2018-12-04 DIAGNOSIS — R03.0 ELEVATED BLOOD PRESSURE READING: ICD-10-CM

## 2018-12-04 LAB
ALBUMIN SERPL BCP-MCNC: 4.5 G/DL
ALP SERPL-CCNC: 78 U/L
ALT SERPL W/O P-5'-P-CCNC: 14 U/L
ANION GAP SERPL CALC-SCNC: 14 MMOL/L
APTT BLDCRRT: 30.6 SEC
AST SERPL-CCNC: 13 U/L
BASOPHILS # BLD AUTO: 0.01 K/UL
BASOPHILS NFR BLD: 0.1 %
BILIRUB SERPL-MCNC: 0.6 MG/DL
BUN SERPL-MCNC: 13 MG/DL
CALCIUM SERPL-MCNC: 9.3 MG/DL
CHLORIDE SERPL-SCNC: 105 MMOL/L
CO2 SERPL-SCNC: 20 MMOL/L
CREAT SERPL-MCNC: 0.8 MG/DL
DIFFERENTIAL METHOD: ABNORMAL
EOSINOPHIL # BLD AUTO: 0.2 K/UL
EOSINOPHIL NFR BLD: 2.6 %
ERYTHROCYTE [DISTWIDTH] IN BLOOD BY AUTOMATED COUNT: 11.9 %
EST. GFR  (AFRICAN AMERICAN): >60 ML/MIN/1.73 M^2
EST. GFR  (NON AFRICAN AMERICAN): >60 ML/MIN/1.73 M^2
GLUCOSE SERPL-MCNC: 111 MG/DL
HCT VFR BLD AUTO: 41.4 %
HGB BLD-MCNC: 14.6 G/DL
INR PPP: 1
LYMPHOCYTES # BLD AUTO: 2 K/UL
LYMPHOCYTES NFR BLD: 22.8 %
MCH RBC QN AUTO: 32.3 PG
MCHC RBC AUTO-ENTMCNC: 35.3 G/DL
MCV RBC AUTO: 92 FL
MONOCYTES # BLD AUTO: 0.9 K/UL
MONOCYTES NFR BLD: 9.7 %
NEUTROPHILS # BLD AUTO: 5.7 K/UL
NEUTROPHILS NFR BLD: 64.7 %
PLATELET # BLD AUTO: 218 K/UL
PMV BLD AUTO: 11.1 FL
POTASSIUM SERPL-SCNC: 3.4 MMOL/L
PROT SERPL-MCNC: 7.9 G/DL
PROTHROMBIN TIME: 10.7 SEC
RBC # BLD AUTO: 4.52 M/UL
SODIUM SERPL-SCNC: 139 MMOL/L
WBC # BLD AUTO: 8.74 K/UL

## 2018-12-04 PROCEDURE — 85025 COMPLETE CBC W/AUTO DIFF WBC: CPT

## 2018-12-04 PROCEDURE — 96376 TX/PRO/DX INJ SAME DRUG ADON: CPT

## 2018-12-04 PROCEDURE — 80053 COMPREHEN METABOLIC PANEL: CPT

## 2018-12-04 PROCEDURE — 96375 TX/PRO/DX INJ NEW DRUG ADDON: CPT

## 2018-12-04 PROCEDURE — 96374 THER/PROPH/DIAG INJ IV PUSH: CPT

## 2018-12-04 PROCEDURE — 63600175 PHARM REV CODE 636 W HCPCS: Performed by: EMERGENCY MEDICINE

## 2018-12-04 PROCEDURE — 85730 THROMBOPLASTIN TIME PARTIAL: CPT

## 2018-12-04 PROCEDURE — 85610 PROTHROMBIN TIME: CPT

## 2018-12-04 PROCEDURE — 25000003 PHARM REV CODE 250: Performed by: EMERGENCY MEDICINE

## 2018-12-04 PROCEDURE — 96361 HYDRATE IV INFUSION ADD-ON: CPT

## 2018-12-04 PROCEDURE — 99284 EMERGENCY DEPT VISIT MOD MDM: CPT | Mod: 25

## 2018-12-04 RX ORDER — HYDROMORPHONE HYDROCHLORIDE 2 MG/ML
0.5 INJECTION, SOLUTION INTRAMUSCULAR; INTRAVENOUS; SUBCUTANEOUS
Status: COMPLETED | OUTPATIENT
Start: 2018-12-04 | End: 2018-12-04

## 2018-12-04 RX ORDER — BUTALBITAL, ACETAMINOPHEN, CAFFEINE AND CODEINE PHOSPHATE 300; 50; 40; 30 MG/1; MG/1; MG/1; MG/1
1 CAPSULE ORAL EVERY 6 HOURS PRN
Qty: 16 CAPSULE | Refills: 0 | Status: SHIPPED | OUTPATIENT
Start: 2018-12-04 | End: 2019-09-03

## 2018-12-04 RX ORDER — SODIUM CHLORIDE 9 MG/ML
1000 INJECTION, SOLUTION INTRAVENOUS
Status: COMPLETED | OUTPATIENT
Start: 2018-12-04 | End: 2018-12-04

## 2018-12-04 RX ORDER — ONDANSETRON 4 MG/1
4 TABLET, ORALLY DISINTEGRATING ORAL EVERY 6 HOURS PRN
Qty: 12 TABLET | Refills: 0 | Status: SHIPPED | OUTPATIENT
Start: 2018-12-04 | End: 2019-11-07 | Stop reason: CLARIF

## 2018-12-04 RX ORDER — ONDANSETRON 2 MG/ML
4 INJECTION INTRAMUSCULAR; INTRAVENOUS ONCE
Status: COMPLETED | OUTPATIENT
Start: 2018-12-04 | End: 2018-12-04

## 2018-12-04 RX ADMIN — HYDROMORPHONE HYDROCHLORIDE 0.5 MG: 2 INJECTION INTRAMUSCULAR; INTRAVENOUS; SUBCUTANEOUS at 01:12

## 2018-12-04 RX ADMIN — SODIUM CHLORIDE 1000 ML: 0.9 INJECTION, SOLUTION INTRAVENOUS at 02:12

## 2018-12-04 RX ADMIN — ONDANSETRON 4 MG: 2 INJECTION, SOLUTION INTRAMUSCULAR; INTRAVENOUS at 01:12

## 2018-12-04 NOTE — ED PROVIDER NOTES
History     Chief Complaint   Patient presents with    Headache       Review of patient's allergies indicates:  No Known Allergies    History of Present Illness   HPI    12/4/2018, 12:32 PM  The history is provided by the patient, Jessica Bonds is a 38 y.o. female presenting to the ED for headache. On March 14, 2017 she presented to an ED in [place] with complaints of nausea, vomiting and a headache. 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 Aguilar. Patient underwent a right temporal craniotomy with evacuation of a blood clot under pressure. Patient states she was also believed to have suffered a stroke. Since then she has been placed on Keppra, Gabapentin and Klonopin. She admits she followed up with Dr. Aguilar once since her surgery but was then referred to begin follow up in [place] secondary to her insurance. Patient has a history of a cerebral aneurysm rupture.  She does suffer from chronic headaches.  She is followed by Dr.Alexander Ferguson at [facility].   Patient states the headache started yesterday.  It is located in the right frontal region then radiates to the back.  She woke up this morning feeling well.  She states that after 2 hr of being up, she started having gradual onset of right-sided headache pain. Is associated with nausea, no vomiting. Significant other, marked denies any new noticeable neurologic symptoms. 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 Patient denies new neurologic symptoms. She notes that she had seen her neurologist on November 26, 2018 was started on Keppra.  Significant other is concerned the Keppra may be the source of the pain. The head ache is associated with a shaking episode.  During the shaking episode, patient is awake, alert, oriented. She is able to be directed.  She is able to follow commands.  There is  no loss of control of bowel or bladder.  Patient denies any fever, chills, body aches, dysuria, urgency, frequency, diarrhea, abdominal pain, chest pain, chest pressure.  Significant other states the only thing that helped her yesterday was Dilaudid.  Patient was seen in the emergency room with no acute findings on the head CT.      Arrival mode:  Personal Vehicle    PCP: VIVIANA Weaver     Allergies:  Review of patient's allergies indicates:  No Known Allergies    Past Medical History:  Past Medical History:   Diagnosis Date    Anxiety     Cerebral aneurysm rupture     Chronic headache     Depression     Stroke        Past Surgical History:  Past Surgical History:   Procedure Laterality Date    BRAIN SURGERY      CHOLECYSTECTOMY      CRANIOTOMY      HERNIA REPAIR      HYSTERECTOMY           Family History:  Family History   Problem Relation Age of Onset    No Known Problems Mother     Arthritis Father        Social History:  Social History     Tobacco Use    Smoking status: Current Every Day Smoker     Packs/day: 0.50     Types: Cigarettes    Smokeless tobacco: Never Used   Substance and Sexual Activity    Alcohol use: Yes     Comment: occasional    Drug use: No    Sexual activity: Not Currently        Review of Systems   Review of Systems   Constitutional: Negative for fever.   HENT: Negative for ear pain, facial swelling, rhinorrhea, sinus pressure and sore throat.    Respiratory: Negative for cough and shortness of breath.    Cardiovascular: Negative for chest pain and palpitations.   Gastrointestinal: Positive for nausea. Negative for abdominal pain and vomiting.   Genitourinary: Negative for dysuria.        Denies bowel or bladder incontinence.   Musculoskeletal: Negative for back pain.   Skin: Negative for rash.   Neurological: Positive for headaches. Negative for facial asymmetry, speech difficulty, weakness, light-headedness and numbness.   Hematological: Does not bruise/bleed  easily.   Psychiatric/Behavioral: The patient is nervous/anxious.         Physical Exam     Initial Vitals [12/04/18 1240]   BP Pulse Resp Temp SpO2   (!) 139/96 85 18 98.3 °F (36.8 °C) 97 %      MAP       --          Physical Exam    Nursing Notes and Vital Signs Reviewed.  Constitutional: Patient is in thin appearing.  Patient is in wheelchair.  Her body appears to be in an extended position.  She appears to be having shaking episode.  She states please give me something for my pain.  Speech is clear, but tearful.  She is able to follow commands.  Head: Atraumatic. Normocephalic.  Eyes: PERRL. EOM intact. Conjunctivae are not pale. No scleral icterus.  ENT: Mucous membranes are moist. Oropharynx is clear and symmetric.    Neck: Supple. Full ROM. No lymphadenopathy.  No bruits.  No meningismus.  Cardiovascular: Regular rate. Regular rhythm. No murmurs, rubs, or gallops. Distal pulses are 2+ and symmetric.  Pulmonary/Chest: No respiratory distress. Clear to auscultation bilaterally. No wheezing or rales.  Abdominal: Soft and non-distended.  There is no tenderness.  No rebound, guarding, or rigidity. Good bowel sounds.  Genitourinary: No CVA tenderness  Musculoskeletal: Moves all extremities. No obvious deformities. No edema. No calf tenderness.  Skin: Warm and dry.  Neurological:  Alert, awake, and appropriate.  Normal speech.  No acute focal neurological deficits are appreciated. Cranial nerves 2-12 are intact.  Appears hyperreflexic for patellar and biceps bilaterally.  Psychiatric: Normal affect. Good eye contact. Appropriate in content.     ED Course   Procedures  ED Vital Signs:  Vitals:    12/04/18 1240 12/04/18 1331 12/04/18 1346 12/04/18 1432   BP: (!) 139/96 131/85 125/85 (!) 146/82   Pulse: 85 86 80 79   Resp: 18 20 18 18   Temp: 98.3 °F (36.8 °C)      TempSrc: Oral      SpO2: 97% 98% 98% 95%       Abnormal Lab Results:  Labs Reviewed   CBC W/ AUTO DIFFERENTIAL - Abnormal; Notable for the following  components:       Result Value    MCH 32.3 (*)     All other components within normal limits   COMPREHENSIVE METABOLIC PANEL - Abnormal; Notable for the following components:    Potassium 3.4 (*)     CO2 20 (*)     Glucose 111 (*)     All other components within normal limits   PROTIME-INR   APTT        All Lab Results:  Results for orders placed or performed during the hospital encounter of 12/04/18   CBC auto differential   Result Value Ref Range    WBC 8.74 3.90 - 12.70 K/uL    RBC 4.52 4.00 - 5.40 M/uL    Hemoglobin 14.6 12.0 - 16.0 g/dL    Hematocrit 41.4 37.0 - 48.5 %    MCV 92 82 - 98 fL    MCH 32.3 (H) 27.0 - 31.0 pg    MCHC 35.3 32.0 - 36.0 g/dL    RDW 11.9 11.5 - 14.5 %    Platelets 218 150 - 350 K/uL    MPV 11.1 9.2 - 12.9 fL    Gran # (ANC) 5.7 1.8 - 7.7 K/uL    Lymph # 2.0 1.0 - 4.8 K/uL    Mono # 0.9 0.3 - 1.0 K/uL    Eos # 0.2 0.0 - 0.5 K/uL    Baso # 0.01 0.00 - 0.20 K/uL    Gran% 64.7 38.0 - 73.0 %    Lymph% 22.8 18.0 - 48.0 %    Mono% 9.7 4.0 - 15.0 %    Eosinophil% 2.6 0.0 - 8.0 %    Basophil% 0.1 0.0 - 1.9 %    Differential Method Automated    Comprehensive metabolic panel   Result Value Ref Range    Sodium 139 136 - 145 mmol/L    Potassium 3.4 (L) 3.5 - 5.1 mmol/L    Chloride 105 95 - 110 mmol/L    CO2 20 (L) 23 - 29 mmol/L    Glucose 111 (H) 70 - 110 mg/dL    BUN, Bld 13 6 - 20 mg/dL    Creatinine 0.8 0.5 - 1.4 mg/dL    Calcium 9.3 8.7 - 10.5 mg/dL    Total Protein 7.9 6.0 - 8.4 g/dL    Albumin 4.5 3.5 - 5.2 g/dL    Total Bilirubin 0.6 0.1 - 1.0 mg/dL    Alkaline Phosphatase 78 55 - 135 U/L    AST 13 10 - 40 U/L    ALT 14 10 - 44 U/L    Anion Gap 14 8 - 16 mmol/L    eGFR if African American >60.0 >60 mL/min/1.73 m^2    eGFR if non African American >60.0 >60 mL/min/1.73 m^2   Protime-INR   Result Value Ref Range    Prothrombin Time 10.7 9.0 - 12.5 sec    INR 1.0 0.8 - 1.2   APTT   Result Value Ref Range    aPTT 30.6 21.0 - 32.0 sec           Imaging Results:  Imaging Results          CT Head  Without Contrast (Final result)  Result time 12/04/18 13:05:39    Final result by SUSANA Graff Sr., MD (12/04/18 13:05:39)                 Impression:      1. There are craniotomy changes on the right side of the skull.  2. There is encephalomalacia in the right occipital temporal region. There is no evidence of an acute ischemic event.  3. There is no intracranial hemorrhage.  4. There is mild partial opacification of the maxillary sinuses.  This is characteristic of sinusitis.  All CT scans at this facility use dose modulation, iterative reconstruction, and/or weight base dosing when appropriate to reduce radiation dose when appropriate to reduce radiation dose to as low as reasonably achievable.      Electronically signed by: Christopher Graff MD  Date:    12/04/2018  Time:    13:05             Narrative:    EXAMINATION:  CT HEAD WITHOUT CONTRAST    CLINICAL HISTORY:  Headacheheadache;    TECHNIQUE:  Standard brain CT protocol without IV contrast was performed.    COMPARISON:  12/03/2018.    FINDINGS:  There are craniotomy changes on the right side of the skull.  There is encephalomalacia in the right occipital temporal region.  There is no evidence of an acute ischemic event.  There is no intracranial hemorrhage.  There is mild partial opacification of the maxillary sinuses.                                   The Emergency Provider reviewed the vital signs and test results, which are outlined above.     ED Discussion         2:36 PM Reassessment: Dr. Blevins reassessed the pt.  Patient is drinking Sprite.    GCS = 15.    Patient presentation is similar to prior headaches.   Patient is stating that Fioricet with codeine helps her the best. The pt is resting comfortably and is NAD.  Pt states their sx have improved.  Patient does appear to have a history of chronic headache. At the time, signs and symptoms do not warrant LP.  Discussed test results, shared treatment plan, specific conditions for return, and the  need for f/u.  Answered their questions at this time.  Pt understands and agrees to the plan.  The pt has remained hemodynamically stable through ED course and is stable for discharge.    Pain Medication Interaction warning.    Do not take any sedative medications (benadryl, ativan, xanax, Klonopin, trazadone); medicine for sleep; other pain pills (lortab, percocet), alcohol, with your narcotic prescription.  This could lead to an accidental overdose and possible death.    Return to the ED for:   Suicidal thoughts, unable to awaken, slurred speech, difficulty staying awake or other concerns.     2:38 PM   I discussed with the patient/guardian regarding the the quantity of the opioid.  I discussed the patient/guardian's option to fill the prescription in a lesser quantity.   I also discussed with the patient/guardian the risks associated with the opioid.      Note:  Attempted to speak with patient's treating Neurologist, Dr. FAITH Ferguson.   He did note return the page.      I discussed with patient and/or family/caretaker that evaluation in the ED does not suggest any emergent or life threatening medical conditions requiring immediate intervention beyond what was provided in the ED, and I believe patient is safe for discharge.  Regardless, an unremarkable evaluation in the ED does not preclude the development or presence of a serious of life threatening condition. As such, patient was instructed to return immediately for any worsening or change in current symptoms.      ED Medication(s):  Medications   hydromorphone (PF) injection 0.5 mg (0.5 mg Intravenous Given 12/4/18 1304)   ondansetron injection 4 mg (4 mg Intravenous Given 12/4/18 1303)   hydromorphone (PF) injection 0.5 mg (0.5 mg Intravenous Given 12/4/18 1347)   0.9%  NaCl infusion (1,000 mLs Intravenous New Bag 12/4/18 1413)          Medication List      START taking these medications    butalbital-acetaminop-caf-cod -00-30 mg Cap  Take 1 capsule by mouth  every 6 (six) hours as needed.     ondansetron 4 MG Tbdl  Commonly known as:  ZOFRAN-ODT  Take 1 tablet (4 mg total) by mouth every 6 (six) hours as needed (nausea).        ASK your doctor about these medications    amoxicillin-clavulanate 875-125mg 875-125 mg per tablet  Commonly known as:  AUGMENTIN  Take 1 tablet by mouth 2 (two) times daily.     clonazePAM 1 MG tablet  Commonly known as:  KLONOPIN     famotidine 20 MG tablet  Commonly known as:  PEPCID  Take 1 tablet (20 mg total) by mouth 2 (two) times daily.     folic acid 1 MG tablet  Commonly known as:  FOLVITE     gabapentin 300 MG capsule  Commonly known as:  NEURONTIN     levETIRAcetam 500 MG Tab  Commonly known as:  KEPPRA     lovastatin 10 MG tablet  Commonly known as:  MEVACOR     naproxen 500 MG tablet  Commonly known as:  NAPROSYN  Take 1 tablet (500 mg total) by mouth 2 (two) times daily with meals.     paroxetine 10 MG tablet  Commonly known as:  PAXIL     propranolol 10 MG tablet  Commonly known as:  INDERAL     ranitidine 150 MG tablet  Commonly known as:  ZANTAC     tiZANidine 4 MG tablet  Commonly known as:  ZANAFLEX           Where to Get Your Medications      You can get these medications from any pharmacy    Bring a paper prescription for each of these medications  · butalbital-acetaminop-caf-cod -15-30 mg Cap  · ondansetron 4 MG Tbdl         Follow-up Information     Dr. Jac Ferguson In 2 days.    Why:  Return to the ED for:   Nausea, vomiting, severe headache, numbness or weakness to 1 side, or other concerns  Contact information:  [id number]  [place] [address]                      [facility] Measures     Smoker? Yes     Hypertension: History of Hypertension: The patient has elevated blood pressure (higher than 120/80) while being treated in the ED but has a history of hypertension.         Medical Decision Making         Additional MDM:   Smoking Cessation: The patient is a smoker. The patient was counseled on smoking  cessation for: 3 minutes. Appropriate patient literature was given to the patient concerning tobacco cessation. The patient was counseled on tobacco related  health complications.             Clinical Impression       ICD-10-CM ICD-9-CM   1. Tobacco use Z72.0 305.1   2. Headache R51 784.0   3. Elevated blood pressure reading R03.0 796.2       Disposition:   Disposition: Discharged  Condition: Stable                  Sammi Blevins, DO  12/04/18 1453       Sammi Blevins, DO  12/04/18 1456

## 2018-12-04 NOTE — DISCHARGE INSTRUCTIONS
Pain Medication Interaction warning.    Do not take any sedative medications (benadryl, ativan, xanax, Klonopin, trazadone); medicine for sleep; other pain pills (lortab, percocet), alcohol, with your narcotic prescription.  This could lead to an accidental overdose and possible death.

## 2018-12-06 ENCOUNTER — HOSPITAL ENCOUNTER (EMERGENCY)
Facility: HOSPITAL | Age: 38
Discharge: HOME | End: 2018-12-06
Attending: EMERGENCY MEDICINE
Payer: MEDICAID

## 2018-12-06 VITALS
HEART RATE: 84 BPM | HEIGHT: 62 IN | SYSTOLIC BLOOD PRESSURE: 122 MMHG | RESPIRATION RATE: 18 BRPM | DIASTOLIC BLOOD PRESSURE: 78 MMHG | OXYGEN SATURATION: 98 % | TEMPERATURE: 98 F | WEIGHT: 134 LBS | BODY MASS INDEX: 24.66 KG/M2

## 2018-12-06 DIAGNOSIS — J01.00 ACUTE MAXILLARY SINUSITIS, RECURRENCE NOT SPECIFIED: Primary | ICD-10-CM

## 2018-12-06 PROCEDURE — 96372 THER/PROPH/DIAG INJ SC/IM: CPT

## 2018-12-06 PROCEDURE — 25000003 PHARM REV CODE 250: Performed by: EMERGENCY MEDICINE

## 2018-12-06 PROCEDURE — 99284 EMERGENCY DEPT VISIT MOD MDM: CPT | Mod: 25

## 2018-12-06 PROCEDURE — 63600175 PHARM REV CODE 636 W HCPCS: Performed by: EMERGENCY MEDICINE

## 2018-12-06 RX ORDER — ONDANSETRON 4 MG/1
4 TABLET, ORALLY DISINTEGRATING ORAL
Status: COMPLETED | OUTPATIENT
Start: 2018-12-06 | End: 2018-12-06

## 2018-12-06 RX ORDER — HYDROCODONE BITARTRATE AND ACETAMINOPHEN 5; 325 MG/1; MG/1
1 TABLET ORAL EVERY 4 HOURS PRN
Qty: 10 TABLET | Refills: 0 | Status: SHIPPED | OUTPATIENT
Start: 2018-12-06 | End: 2019-09-03

## 2018-12-06 RX ORDER — HYDROMORPHONE HYDROCHLORIDE 2 MG/ML
1 INJECTION, SOLUTION INTRAMUSCULAR; INTRAVENOUS; SUBCUTANEOUS
Status: COMPLETED | OUTPATIENT
Start: 2018-12-06 | End: 2018-12-06

## 2018-12-06 RX ADMIN — HYDROMORPHONE HYDROCHLORIDE 1 MG: 2 INJECTION INTRAMUSCULAR; INTRAVENOUS; SUBCUTANEOUS at 12:12

## 2018-12-06 RX ADMIN — ONDANSETRON 4 MG: 4 TABLET, ORALLY DISINTEGRATING ORAL at 12:12

## 2018-12-06 NOTE — ED PROVIDER NOTES
Encounter Date: 12/6/2018       History     Chief Complaint   Patient presents with    Spasms     chronic     The history is provided by the patient.   Sinusitis    This is a new problem. The current episode started several days ago. The problem has been unchanged. The pain is at a severity of 6/10. The pain has been intermittent since onset. Associated symptoms include congestion, sinus pressure and sore throat. Pertinent negatives include no chills, no sweats, no swollen glands, no cough and no shortness of breath. She has tried nothing for the symptoms. The treatment provided no relief.   Pt was Rx Augmentin on 12/1 but didn;t fill the rx.    Review of patient's allergies indicates:  No Known Allergies  Past Medical History:   Diagnosis Date    Anxiety     Cerebral aneurysm rupture     Chronic headache     Depression     Stroke      Past Surgical History:   Procedure Laterality Date    BRAIN SURGERY      CHOLECYSTECTOMY      CRANIOTOMY      HERNIA REPAIR      HYSTERECTOMY       Family History   Problem Relation Age of Onset    No Known Problems Mother     Arthritis Father      Social History     Tobacco Use    Smoking status: Current Every Day Smoker     Packs/day: 0.50     Types: Cigarettes    Smokeless tobacco: Never Used   Substance Use Topics    Alcohol use: Yes     Comment: occasional    Drug use: No     Review of Systems   Constitutional: Negative for chills.   HENT: Positive for congestion, sinus pressure and sore throat.    Respiratory: Negative for cough and shortness of breath.    All other systems reviewed and are negative.      Physical Exam     Initial Vitals [12/06/18 1203]   BP Pulse Resp Temp SpO2   129/81 88 20 98 °F (36.7 °C) 98 %      MAP       --         Physical Exam    Nursing note and vitals reviewed.  Constitutional: She appears well-developed and well-nourished. No distress.   HENT:   Head: Normocephalic and atraumatic.   Right Ear: Tympanic membrane normal.   Left Ear:  Tympanic membrane normal.   Nose: Right sinus exhibits maxillary sinus tenderness. Right sinus exhibits no frontal sinus tenderness. Left sinus exhibits maxillary sinus tenderness. Left sinus exhibits no frontal sinus tenderness.   Mouth/Throat: Uvula is midline, oropharynx is clear and moist and mucous membranes are normal.   Eyes: Conjunctivae and EOM are normal. Pupils are equal, round, and reactive to light.   Neck: Normal range of motion. Neck supple.   Cardiovascular: Normal rate, regular rhythm and normal heart sounds.   Pulmonary/Chest: Breath sounds normal. No respiratory distress. She has no wheezes.   Abdominal: Soft. Bowel sounds are normal.   Musculoskeletal: Normal range of motion.   Neurological: She is alert and oriented to person, place, and time. She has normal strength.   Skin: Skin is warm and dry.   Psychiatric: She has a normal mood and affect. Thought content normal.         ED Course   Procedures  Labs Reviewed - No data to display       Imaging Results    None     12:35 PM - Counseling: Spoke with the patient and discussed todays findings, in addition to providing specific details for the plan of care and counseling regarding the diagnosis and prognosis. Questions are answered at this time. Pt instructed to fill rx given on 12/1 for Augmentin. Pt agrees c plan and has close follow up with PCP in a few days. GCS 15. Pt is in NAD.                            Clinical Impression:   The encounter diagnosis was Acute maxillary sinusitis, recurrence not specified.      Disposition:   Disposition: Discharged  Condition: Stable                        Jason Ramos MD  12/06/18 [id number]

## 2018-12-10 ENCOUNTER — HOSPITAL ENCOUNTER (EMERGENCY)
Facility: HOSPITAL | Age: 38
Discharge: HOME | End: 2018-12-10
Attending: EMERGENCY MEDICINE
Payer: MEDICAID

## 2018-12-10 VITALS
SYSTOLIC BLOOD PRESSURE: 152 MMHG | WEIGHT: 134 LBS | TEMPERATURE: 98 F | BODY MASS INDEX: 24.66 KG/M2 | HEART RATE: 79 BPM | HEIGHT: 62 IN | OXYGEN SATURATION: 99 % | DIASTOLIC BLOOD PRESSURE: 96 MMHG | RESPIRATION RATE: 18 BRPM

## 2018-12-10 DIAGNOSIS — J32.9 SINUSITIS, UNSPECIFIED CHRONICITY, UNSPECIFIED LOCATION: Primary | ICD-10-CM

## 2018-12-10 PROCEDURE — 25000003 PHARM REV CODE 250: Performed by: EMERGENCY MEDICINE

## 2018-12-10 PROCEDURE — 99283 EMERGENCY DEPT VISIT LOW MDM: CPT

## 2018-12-10 RX ORDER — PROPRANOLOL HYDROCHLORIDE 60 MG/1
1 TABLET ORAL 2 TIMES DAILY
Refills: 6 | COMMUNITY
Start: 2018-11-05 | End: 2019-11-10

## 2018-12-10 RX ORDER — CHOLECALCIFEROL (VITAMIN D3) 50 MCG
1 TABLET ORAL DAILY
Refills: 3 | COMMUNITY
Start: 2018-09-27 | End: 2023-10-19 | Stop reason: SDUPTHER

## 2018-12-10 RX ORDER — PAROXETINE HYDROCHLORIDE 40 MG/1
40 TABLET, FILM COATED ORAL NIGHTLY
Refills: 6 | COMMUNITY
Start: 2018-11-05 | End: 2023-10-19 | Stop reason: SDUPTHER

## 2018-12-10 RX ORDER — ACETAMINOPHEN 325 MG/1
1000 TABLET ORAL
Status: COMPLETED | OUTPATIENT
Start: 2018-12-10 | End: 2018-12-10

## 2018-12-10 RX ADMIN — ACETAMINOPHEN 975 MG: 325 TABLET, FILM COATED ORAL at 05:12

## 2018-12-10 NOTE — ED PROVIDER NOTES
Encounter Date: 12/10/2018       History     Chief Complaint   Patient presents with    Sinusitis     Pt arrived the ER in convulsions c/o sinus pressure and pain.     The history is provided by the patient.   Sinusitis    This is a new problem. The current episode started several days ago. The problem has been unchanged. Pain scale: mild. The pain has been constant since onset. Associated symptoms include congestion, sinus pressure and cough. Pertinent negatives include no chills, no sweats, no ear pain, no hoarse voice, no sore throat, no swollen glands and no shortness of breath. She has tried nothing (pt went to her PCP today and was given a steroid shot and rx'd augmentin.  pt comes to ER today for second opinion) for the symptoms. The treatment provided no relief.     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   HENT: Positive for congestion and sinus pressure. Negative for ear pain, hoarse voice and sore throat.    Respiratory: Positive for cough. Negative for shortness of breath.    Cardiovascular: Negative for chest pain.   Gastrointestinal: Negative for nausea.   Genitourinary: Negative for dysuria.   Musculoskeletal: Negative for back pain.   Skin: Negative for rash.    Neurological: Negative for weakness.   Hematological: Does not bruise/bleed easily.   All other systems reviewed and are negative.      Physical Exam     Initial Vitals [12/10/18 1643]   BP Pulse Resp Temp SpO2   (!) 150/115 102 (!) 30 98 °F (36.7 °C) 99 %      MAP       --         Physical Exam    Nursing note and vitals reviewed.  Constitutional: She appears well-developed and well-nourished.   HENT:   Head: Normocephalic and atraumatic.   Right Ear: Hearing, tympanic membrane, external ear and ear canal normal.   Left Ear: Hearing, tympanic membrane, external ear and ear canal normal.   Nose: Right sinus exhibits frontal sinus tenderness. Left sinus exhibits frontal sinus tenderness.   Mouth/Throat: Uvula is midline and mucous membranes are normal. Posterior oropharyngeal erythema present. No oropharyngeal exudate or posterior oropharyngeal edema.   Eyes: Conjunctivae and EOM are normal. Pupils are equal, round, and reactive to light.   Neck: Normal range of motion. Neck supple. No thyromegaly present.   Cardiovascular: Normal rate, regular rhythm, normal heart sounds and intact distal pulses. Exam reveals no gallop and no friction rub.    No murmur heard.  Pulmonary/Chest: Breath sounds normal. No respiratory distress. She has no wheezes. She has no rhonchi. She exhibits no tenderness.   Abdominal: Soft. Bowel sounds are normal. She exhibits no distension. There is no tenderness. There is no rebound and no guarding.   Musculoskeletal: Normal range of motion. She exhibits no edema or tenderness.   Lymphadenopathy:     She has no cervical adenopathy.   Neurological: She is alert and oriented to person, place, and time. She has normal strength. No cranial nerve deficit or sensory deficit.   Skin: Skin is warm and dry. No rash noted.   Psychiatric: She has a normal mood and affect. Her behavior is normal. Judgment and thought content normal.         ED Course   Procedures  Labs Reviewed - No data to display    "    Imaging Results    None                Vitals:    12/10/18 1638 12/10/18 1643 12/10/18 1701   BP:  (!) 150/115 (!) 152/96   Pulse:  102 79   Resp:  (!) 30 18   Temp:  98 °F (36.7 °C)    TempSrc:  Oral    SpO2:  99% 99%   Weight: 60.8 kg (134 lb)     Height: 5' 2" (1.575 m)         Results for orders placed or performed during the hospital encounter of 12/04/18   CBC auto differential   Result Value Ref Range    WBC 8.74 3.90 - 12.70 K/uL    RBC 4.52 4.00 - 5.40 M/uL    Hemoglobin 14.6 12.0 - 16.0 g/dL    Hematocrit 41.4 37.0 - 48.5 %    MCV 92 82 - 98 fL    MCH 32.3 (H) 27.0 - 31.0 pg    MCHC 35.3 32.0 - 36.0 g/dL    RDW 11.9 11.5 - 14.5 %    Platelets 218 150 - 350 K/uL    MPV 11.1 9.2 - 12.9 fL    Gran # (ANC) 5.7 1.8 - 7.7 K/uL    Lymph # 2.0 1.0 - 4.8 K/uL    Mono # 0.9 0.3 - 1.0 K/uL    Eos # 0.2 0.0 - 0.5 K/uL    Baso # 0.01 0.00 - 0.20 K/uL    Gran% 64.7 38.0 - 73.0 %    Lymph% 22.8 18.0 - 48.0 %    Mono% 9.7 4.0 - 15.0 %    Eosinophil% 2.6 0.0 - 8.0 %    Basophil% 0.1 0.0 - 1.9 %    Differential Method Automated    Comprehensive metabolic panel   Result Value Ref Range    Sodium 139 136 - 145 mmol/L    Potassium 3.4 (L) 3.5 - 5.1 mmol/L    Chloride 105 95 - 110 mmol/L    CO2 20 (L) 23 - 29 mmol/L    Glucose 111 (H) 70 - 110 mg/dL    BUN, Bld 13 6 - 20 mg/dL    Creatinine 0.8 0.5 - 1.4 mg/dL    Calcium 9.3 8.7 - 10.5 mg/dL    Total Protein 7.9 6.0 - 8.4 g/dL    Albumin 4.5 3.5 - 5.2 g/dL    Total Bilirubin 0.6 0.1 - 1.0 mg/dL    Alkaline Phosphatase 78 55 - 135 U/L    AST 13 10 - 40 U/L    ALT 14 10 - 44 U/L    Anion Gap 14 8 - 16 mmol/L    eGFR if African American >60.0 >60 mL/min/1.73 m^2    eGFR if non African American >60.0 >60 mL/min/1.73 m^2   Protime-INR   Result Value Ref Range    Prothrombin Time 10.7 9.0 - 12.5 sec    INR 1.0 0.8 - 1.2   APTT   Result Value Ref Range    aPTT 30.6 21.0 - 32.0 sec         Imaging Results    None         Medications   acetaminophen tablet 975 mg (975 mg Oral Given " 12/10/18 1703)       4:56 PM - Re-evaluation: The patient is resting comfortably and is in no acute distress. She states that her symptoms have improved after treatment within ER. Discussed shared treatment plan, specific conditions for return, and importance of follow up with patient and family.  Advised to take all prescriptions as directed and to f/u c pcp in one week for reevaluation.  She understands and agrees with the plan as discussed. Answered  her questions at this time. She has remained hemodynamically stable throughout the ED course and is appropriate for discharge home.     Regarding SINUSITIS, for treatment, encouraged patient to refrain from smoking, drink plenty of fluids, rest, take medications as prescribed, and to use a humidifier or steam in the bathroom. Patient instructed to notify primary care provider if: they have a cough most days or have a cough that returns frequently; are coughing up blood; have a high fever or shaking chills; have a low-grade fever for three or more days; develop thick, greenish mucus, especially if it has a bad smell; and feel short of breath or have chest pain, or return to emergency department for further evaluation. Also recommended that the patient shower in the morning and evening to wash away any allergens and help reduce the production of mucus, avoid taking decongestants if diagnosed with hypertension.  If decongestants were recommended, advised patient to not take for longer than 5 days to help prevent rebound congestion. For prevention, discussed with patient the importance of not smoking, getting annual influenza vaccines, reducing exposure to air pollution, and to frequently wash hands to avoid spread of infection. Instructed patient to follow up with primary care provider.    Pre-hypertension/Hypertension: The pt has been informed that they may have pre-hypertension or hypertension based on a blood pressure reading in the ED. I recommend that the pt call  the PCP listed on their discharge instructions or a physician of their choice this week to arrange f/u for further evaluation of possible pre-hypertension or hypertension.     Isabelle Bonds was given a handout which discussed their disease process, precautions, and instructions for follow-up and therapy.    Follow-up Information     VIVIANA Weaver. Schedule an appointment as soon as possible for a visit in 1 week.    Specialty:  Family Medicine  Contact information:  [address]  [place] [address] [phone]             [facility].    Specialty:  Emergency Medicine  Why:  As needed, If symptoms worsen  Contact information:  [address] [address] [address] [phone]                    Medication List      ASK your doctor about these medications    amoxicillin-clavulanate 875-125mg 875-125 mg per tablet  Commonly known as:  AUGMENTIN  Take 1 tablet by mouth 2 (two) times daily.     butalbital-acetaminop-caf-cod -95-30 mg Cap  Take 1 capsule by mouth every 6 (six) hours as needed.     cholecalciferol (vitamin D3) 2,000 unit Tab  Commonly known as:  VITAMIN D3     clonazePAM 1 MG tablet  Commonly known as:  KLONOPIN     famotidine 20 MG tablet  Commonly known as:  PEPCID  Take 1 tablet (20 mg total) by mouth 2 (two) times daily.     folic acid 1 MG tablet  Commonly known as:  FOLVITE     gabapentin 300 MG capsule  Commonly known as:  NEURONTIN     HYDROcodone-acetaminophen 5-325 mg per tablet  Commonly known as:  NORCO  Take 1 tablet by mouth every 4 (four) hours as needed for Pain.     levETIRAcetam 500 MG Tab  Commonly known as:  KEPPRA     lovastatin 10 MG tablet  Commonly known as:  MEVACOR     naproxen 500 MG tablet  Commonly known as:  NAPROSYN  Take 1 tablet (500 mg total) by mouth 2 (two) times daily with meals.     ondansetron 4 MG Tbdl  Commonly known as:  ZOFRAN-ODT  Take 1 tablet (4 mg total) by mouth every 6 (six) hours as needed (nausea).      paroxetine 40 MG tablet  Commonly known as:  PAXIL     propranolol 60 MG tablet  Commonly known as:  INDERAL     ranitidine 150 MG tablet  Commonly known as:  ZANTAC     tiZANidine 4 MG tablet  Commonly known as:  ZANAFLEX           Current Discharge Medication List            ED Diagnosis  1. Sinusitis, unspecified chronicity, unspecified location    '                    Clinical Impression:   The encounter diagnosis was Sinusitis, unspecified chronicity, unspecified location.      Disposition:   Disposition: Discharged  Condition: Stable                        Renard Jansen Jr., MD  12/10/18 [id number]

## 2018-12-10 NOTE — DISCHARGE INSTRUCTIONS
Regarding SINUSITIS, for treatment, encouraged patient to refrain from smoking, drink plenty of fluids, rest, take medications as prescribed, and to use a humidifier or steam in the bathroom. Patient instructed to notify primary care provider if: they have a cough most days or have a cough that returns frequently; are coughing up blood; have a high fever or shaking chills; have a low-grade fever for three or more days; develop thick, greenish mucus, especially if it has a bad smell; and feel short of breath or have chest pain, or return to emergency department for further evaluation. Also recommended that the patient shower in the morning and evening to wash away any allergens and help reduce the production of mucus, avoid taking decongestants if diagnosed with hypertension.  If decongestants were recommended, advised patient to not take for longer than 5 days to help prevent rebound congestion. For prevention, discussed with patient the importance of not smoking, getting annual influenza vaccines, reducing exposure to air pollution, and to frequently wash hands to avoid spread of infection. Instructed patient to follow up with primary care provider.

## 2018-12-10 NOTE — ED NOTES
convulions lasting less than 10sec x2, once in triage and once after being placed in bed. gcs 15.

## 2018-12-10 NOTE — ED NOTES
Level of Consciousness: The patient is  awake, alert, and oriented to person, place and time. Pts affect is appropriate, speech is appropriate.   Appearance: Pt is resting in stretcher, no distress is noted. Clothing and hygiene are appropriate for age and situation.   Skin: Skin is W/D/I. Normal skin turgor. Mucous membranes are moist. Skin color normal.  No deformities noted.  Musculoskeletal: Moves all extremities with no difficulty. full range of motion. No obvious deformities noted. Pt ambulates independently.   Respiratory: Airway open and patent. Respiration rate even and unlabored. No use of accessory muscles noted. Breath sounds clear to ausculation.   Cardiac: Regular rate and rhythm. Denies chest pain at this time. No peripheral edema noted. Peripheral pulses palpated. Capillary refill brisk.   Abdomen: No abdominal distension noted. Abdomin soft and non-tender to palpation. Active bowel sounds in all four quadrants.   Neurologic: Symmetrical expression noted in face. Hand grasps equal bilaterally. Normal sensation reported in all extremities. No obvious neurological deficits noted. Pt has intermittent full body convulsions lasting less than 10 seconds. Pt does not lose consciousness during these episodes. Pt complains of frontal headache lasting x days. Dx with sinusitis previously. PEDRO.   Psychosocial: Family at bedside.     Pt made aware of plan of care, verbalizes understanding and denies any questions at this time. Bed low and locked, side rails upx2. Call light in reach. Cardiac monitor applied, alarms on. Will continue to monitor patient.

## 2019-09-03 ENCOUNTER — HOSPITAL ENCOUNTER (EMERGENCY)
Facility: HOSPITAL | Age: 39
Discharge: HOME | End: 2019-09-03
Attending: EMERGENCY MEDICINE
Payer: MEDICAID

## 2019-09-03 VITALS
TEMPERATURE: 99 F | SYSTOLIC BLOOD PRESSURE: 161 MMHG | DIASTOLIC BLOOD PRESSURE: 93 MMHG | RESPIRATION RATE: 17 BRPM | WEIGHT: 131.75 LBS | OXYGEN SATURATION: 98 % | BODY MASS INDEX: 24.87 KG/M2 | HEIGHT: 61 IN | HEART RATE: 95 BPM

## 2019-09-03 DIAGNOSIS — J06.9 ACUTE URI: Primary | ICD-10-CM

## 2019-09-03 DIAGNOSIS — F17.200 CURRENT SMOKER: ICD-10-CM

## 2019-09-03 DIAGNOSIS — J02.9 SORE THROAT: ICD-10-CM

## 2019-09-03 DIAGNOSIS — R03.0 ELEVATED BLOOD PRESSURE READING: ICD-10-CM

## 2019-09-03 LAB — DEPRECATED S PYO AG THROAT QL EIA: NEGATIVE

## 2019-09-03 PROCEDURE — 87880 STREP A ASSAY W/OPTIC: CPT | Mod: ER

## 2019-09-03 PROCEDURE — 99283 EMERGENCY DEPT VISIT LOW MDM: CPT | Mod: ER

## 2019-09-03 PROCEDURE — 87081 CULTURE SCREEN ONLY: CPT

## 2019-09-03 RX ORDER — FLUTICASONE PROPIONATE 50 MCG
2 SPRAY, SUSPENSION (ML) NASAL DAILY
Qty: 1 BOTTLE | Refills: 0 | Status: SHIPPED | OUTPATIENT
Start: 2019-09-03 | End: 2019-11-07 | Stop reason: CLARIF

## 2019-09-03 NOTE — ED PROVIDER NOTES
History      Chief Complaint   Patient presents with    Sore Throat     with sinus pressure for the past two days       Review of patient's allergies indicates:  No Known Allergies     HPI   HPI    9/3/2019, 3:09 PM   History obtained from the patient      History of Present Illness: Isabelle Bonds is a 39 y.o. female patient who presents to the Emergency Department for sore throat x 2 days.  Associated symptoms include sinus pressure, nasal congestion, post nasal drip, and mild cough.  Treatments tried include Nyquil and Dayquil.  Denies fever, vomiting, diarrhea, chest pain, SOB, headache, dizziness.        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/2017    Stroke        Past Surgical History:  Past Surgical History:   Procedure Laterality Date    BRAIN SURGERY      CHOLECYSTECTOMY      CRANIOTOMY      HERNIA REPAIR      HYSTERECTOMY           Family History:  Family History   Problem Relation Age of Onset    No Known Problems Mother     Arthritis Father        Social History:  Social History     Tobacco Use    Smoking status: Current Every Day Smoker     Packs/day: 0.50     Types: Cigarettes    Smokeless tobacco: Never Used   Substance and Sexual Activity    Alcohol use: Yes     Comment: occasional    Drug use: No    Sexual activity: Not Currently     Birth control/protection: See Surgical Hx       ROS   Review of Systems   Constitutional: Negative for appetite change and fever.   HENT: Positive for congestion, postnasal drip, rhinorrhea and sore throat. Negative for ear pain.    Eyes: Negative for discharge and redness.   Respiratory: Positive for cough. Negative for wheezing.    Cardiovascular: Negative for chest pain and palpitations.   Gastrointestinal: Negative for diarrhea, nausea and vomiting.  "  Genitourinary: Negative for dysuria and frequency.   Musculoskeletal: Negative for back pain and neck pain.   Skin: Negative for rash and wound.   Neurological: Negative for dizziness and headaches.       Physical Exam      Initial Vitals [09/03/19 1454]   BP Pulse Resp Temp SpO2   (!) 161/93 95 17 98.6 °F (37 °C) 98 %      MAP       --          Physical Exam  Nursing Notes and Vital Signs Reviewed.  Constitutional: Patient is in no apparent distress. Awake and alert. Well-developed and well-nourished.  Head: Atraumatic. Normocephalic.  Eyes: PERRL. EOM intact. Conjunctivae are not pale. No scleral icterus.  ENT: Mucous membranes are moist. Oropharynx is erythematous, no exudates,  and symmetric.  Bilateral TM unremarkable.  Uvula midline.  Post nasal drip.  Neck: Supple. Full ROM. No lymphadenopathy.  Cardiovascular: Regular rate. Regular rhythm. No murmurs, rubs, or gallops. Distal pulses are 2+ and symmetric.  Pulmonary/Chest: No respiratory distress. Clear to auscultation bilaterally. No wheezing, rales, or rhonchi.  Abdominal: Soft and non-distended.  There is no tenderness.  No rebound, guarding, or rigidity.   Musculoskeletal: Moves all extremities. No obvious deformities. No edema. No calf tenderness.  Skin: Warm and dry.  Neurological:  Alert, awake, and appropriate.  Normal speech.  No acute focal neurological deficits are appreciated.  Psychiatric: Normal affect. Good eye contact. Appropriate in content.    ED Course    Procedures  ED Vital Signs:  Vitals:    09/03/19 1454   BP: (!) 161/93   Pulse: 95   Resp: 17   Temp: 98.6 °F (37 °C)   TempSrc: Oral   SpO2: 98%   Weight: 59.8 kg (131 lb 11.6 oz)   Height: 5' 1" (1.549 m)       Abnormal Lab Results:  Labs Reviewed   THROAT SCREEN, RAPID   CULTURE, STREP A,  THROAT        All Lab Results:  Results for orders placed or performed during the hospital encounter of 09/03/19   Rapid strep screen   Result Value Ref Range    Rapid Strep A Screen Negative " Negative         Imaging Results:  Imaging Results    None                 The Emergency Provider reviewed the vital signs and test results, which are outlined above.    ED Discussion     3:53 PM:  Discussed with pt all pertinent ED information and results. Discussed pt dx and plan of tx. Gave pt all f/u and return to the ED instructions. All questions and concerns were addressed at this time. Pt expresses understanding of information and instructions, and is comfortable with plan to discharge. Pt is stable for discharge.    Pre-hypertension/Hypertension: The pt has been informed that they may have pre-hypertension or hypertension based on a blood pressure reading in the ED. I recommend that the pt call the PCP listed on their discharge instructions or a physician of their choice this week to arrange f/u for further evaluation of possible pre-hypertension or hypertension.     I discussed with patient and/or family/caretaker that evaluation in the ED does not suggest any emergent or life threatening medical conditions requiring immediate intervention beyond what was provided in the ED, and I believe patient is safe for discharge.  Regardless, an unremarkable evaluation in the ED does not preclude the development or presence of a serious of life threatening condition. As such, patient was instructed to return immediately for any worsening or change in current symptoms.      ED Medication(s):  Medications - No data to display    Current Discharge Medication List          Follow-up Information     VIVIANA Weaver. Schedule an appointment as soon as possible for a visit in 3 days.    Specialty:  Family Medicine  Contact information:  [address] [address] [phone]                     Medical Decision Making        Additional MDM:   Hypertension: The patient has hypertension (no treatment required at this time). The patient's condition was felt to be stable.   Smoking Cessation: The patient is  a smoker. The patient was counseled on smoking cessation for: 3 minutes. The patient was counseled on tobacco related  health complications.            Clinical Impression       ICD-10-CM ICD-9-CM   1. Acute URI J06.9 465.9   2. Sore throat J02.9 462   3. Elevated blood pressure reading R03.0 796.2   4. Current smoker F17.200 305.1       Disposition:   Disposition: Discharged  Condition: Stable           Kayleigh Ramesh PA-C  09/03/19 1955

## 2019-09-06 LAB — BACTERIA THROAT CULT: NORMAL

## 2019-11-07 ENCOUNTER — HOSPITAL ENCOUNTER (EMERGENCY)
Facility: HOSPITAL | Age: 39
Discharge: HOME | End: 2019-11-07
Attending: EMERGENCY MEDICINE
Payer: MEDICAID

## 2019-11-07 VITALS
SYSTOLIC BLOOD PRESSURE: 150 MMHG | WEIGHT: 132.63 LBS | DIASTOLIC BLOOD PRESSURE: 95 MMHG | BODY MASS INDEX: 24.41 KG/M2 | OXYGEN SATURATION: 96 % | TEMPERATURE: 98 F | HEART RATE: 93 BPM | HEIGHT: 62 IN | RESPIRATION RATE: 20 BRPM

## 2019-11-07 DIAGNOSIS — M54.32 SCIATICA OF LEFT SIDE: Primary | ICD-10-CM

## 2019-11-07 PROCEDURE — 99284 EMERGENCY DEPT VISIT MOD MDM: CPT | Mod: ER

## 2019-11-07 RX ORDER — PREDNISONE 20 MG/1
40 TABLET ORAL DAILY
Qty: 10 TABLET | Refills: 0 | Status: SHIPPED | OUTPATIENT
Start: 2019-11-07 | End: 2019-11-12

## 2019-11-07 RX ORDER — CYCLOBENZAPRINE HCL 10 MG
10 TABLET ORAL 3 TIMES DAILY PRN
Qty: 15 TABLET | Refills: 0 | Status: SHIPPED | OUTPATIENT
Start: 2019-11-07 | End: 2019-11-12

## 2019-11-07 RX ORDER — NAPROXEN 375 MG/1
375 TABLET ORAL 2 TIMES DAILY WITH MEALS
Qty: 60 TABLET | Refills: 0 | Status: SHIPPED | OUTPATIENT
Start: 2019-11-07

## 2019-11-07 RX ORDER — LORATADINE 10 MG/1
10 TABLET ORAL
COMMUNITY

## 2019-11-08 NOTE — ED PROVIDER NOTES
Encounter Date: 11/7/2019       History     Chief Complaint   Patient presents with    Sciatica     c/o left sided sciatic nerve pain from buttock radiating down left leg. states back pain for 1 week. took gabapentin x2 today     The history is provided by the patient.   Back Pain    This is a recurrent problem. The current episode started several days ago. The problem occurs daily. The problem has been unchanged. The pain is associated with no known injury. The pain is present in the lumbar spine. The quality of the pain is described as shooting. The pain radiates to the left leg. The pain is at a severity of 5/10. The symptoms are aggravated by twisting. Pertinent negatives include no chest pain, no fever, no numbness, no weight loss, no headaches, no abdominal pain, no abdominal swelling, no bowel incontinence, no perianal numbness, no bladder incontinence, no dysuria, no pelvic pain, no leg pain, no paresthesias, no paresis, no tingling and no weakness. She has tried NSAIDs for the symptoms. The treatment provided mild relief.     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   Cardiovascular: Negative for chest pain.   Gastrointestinal: Negative for abdominal pain and bowel  incontinence.   Genitourinary: Negative for bladder incontinence, dysuria and pelvic pain.   Musculoskeletal: Positive for back pain.   Neurological: Negative for tingling, weakness, numbness, headaches and paresthesias.   All other systems reviewed and are negative.      Physical Exam     Initial Vitals [11/07/19 1928]   BP Pulse Resp Temp SpO2   (!) 150/95 93 20 98 °F (36.7 °C) 96 %      MAP       --         Physical Exam    Nursing note and vitals reviewed.  Constitutional: She appears well-developed and well-nourished. No distress.   HENT:   Head: Normocephalic and atraumatic.   Mouth/Throat: Oropharynx is clear and moist.   Eyes: Conjunctivae and EOM are normal. Pupils are equal, round, and reactive to light.   Neck: Normal range of motion. Neck supple.   Cardiovascular: Normal rate, regular rhythm and normal heart sounds.   Pulmonary/Chest: Breath sounds normal. No respiratory distress.   Abdominal: Soft. Bowel sounds are normal. She exhibits no distension. There is no tenderness.   Musculoskeletal: Normal range of motion.        Cervical back: Normal.        Thoracic back: Normal.        Lumbar back: Normal.   Neurological: She is alert and oriented to person, place, and time. She has normal strength.   Skin: Skin is warm and dry.   Psychiatric: She has a normal mood and affect. Thought content normal.         ED Course   Procedures  Labs Reviewed - No data to display       Imaging Results    None     7:54 PM - Counseling: Spoke with the patient and discussed todays findings, in addition to providing specific details for the plan of care and counseling regarding the diagnosis and prognosis. Questions are answered at this time.                                        Clinical Impression:       ICD-10-CM ICD-9-CM   1. Sciatica of left side M54.32 724.3         Disposition:   Disposition: Discharged  Condition: Stable                     Jason Ramos MD  11/07/19 1955

## 2019-11-08 NOTE — ED TRIAGE NOTES
Patient presents to ED via POV and states while sweeping she had lower back pain that shot down the left leg.

## 2019-11-10 ENCOUNTER — HOSPITAL ENCOUNTER (EMERGENCY)
Facility: HOSPITAL | Age: 39
Discharge: ANOTHER HOSPITAL | End: 2019-11-10
Attending: EMERGENCY MEDICINE
Payer: MEDICAID

## 2019-11-10 ENCOUNTER — NURSE TRIAGE (OUTPATIENT)
Dept: ADMINISTRATIVE | Facility: CLINIC | Age: 39
End: 2019-11-10

## 2019-11-10 VITALS
HEIGHT: 62 IN | SYSTOLIC BLOOD PRESSURE: 131 MMHG | DIASTOLIC BLOOD PRESSURE: 87 MMHG | WEIGHT: 133.81 LBS | OXYGEN SATURATION: 98 % | RESPIRATION RATE: 24 BRPM | BODY MASS INDEX: 24.63 KG/M2 | HEART RATE: 76 BPM | TEMPERATURE: 98 F

## 2019-11-10 DIAGNOSIS — M54.42 ACUTE MIDLINE LOW BACK PAIN WITH LEFT-SIDED SCIATICA: ICD-10-CM

## 2019-11-10 DIAGNOSIS — R33.9 URINARY RETENTION: Primary | ICD-10-CM

## 2019-11-10 DIAGNOSIS — R29.898 LEFT LEG WEAKNESS: ICD-10-CM

## 2019-11-10 LAB
ALBUMIN SERPL BCP-MCNC: 4.2 G/DL (ref 3.5–5.2)
ALP SERPL-CCNC: 67 U/L (ref 55–135)
ALT SERPL W/O P-5'-P-CCNC: 41 U/L (ref 10–44)
AMPHET+METHAMPHET UR QL: NEGATIVE
ANION GAP SERPL CALC-SCNC: 10 MMOL/L (ref 8–16)
APTT BLDCRRT: 29.5 SEC (ref 21–32)
AST SERPL-CCNC: 32 U/L (ref 10–40)
B-HCG UR QL: NEGATIVE
BARBITURATES UR QL SCN>200 NG/ML: NEGATIVE
BASOPHILS # BLD AUTO: 0.02 K/UL (ref 0–0.2)
BASOPHILS NFR BLD: 0.2 % (ref 0–1.9)
BENZODIAZ UR QL SCN>200 NG/ML: NEGATIVE
BILIRUB SERPL-MCNC: 0.1 MG/DL (ref 0.1–1)
BILIRUB UR QL STRIP: NEGATIVE
BUN SERPL-MCNC: 12 MG/DL (ref 6–20)
BZE UR QL SCN: NEGATIVE
CALCIUM SERPL-MCNC: 9.5 MG/DL (ref 8.7–10.5)
CANNABINOIDS UR QL SCN: NORMAL
CHLORIDE SERPL-SCNC: 110 MMOL/L (ref 95–110)
CLARITY UR REFRACT.AUTO: CLEAR
CO2 SERPL-SCNC: 18 MMOL/L (ref 23–29)
COLOR UR AUTO: YELLOW
CREAT SERPL-MCNC: 0.7 MG/DL (ref 0.5–1.4)
CREAT UR-MCNC: 29.7 MG/DL (ref 15–325)
DIFFERENTIAL METHOD: ABNORMAL
EOSINOPHIL # BLD AUTO: 0 K/UL (ref 0–0.5)
EOSINOPHIL NFR BLD: 0 % (ref 0–8)
ERYTHROCYTE [DISTWIDTH] IN BLOOD BY AUTOMATED COUNT: 11.9 % (ref 11.5–14.5)
EST. GFR  (AFRICAN AMERICAN): >60 ML/MIN/1.73 M^2
EST. GFR  (NON AFRICAN AMERICAN): >60 ML/MIN/1.73 M^2
GLUCOSE SERPL-MCNC: 104 MG/DL (ref 70–110)
GLUCOSE UR QL STRIP: NEGATIVE
HCT VFR BLD AUTO: 39.9 % (ref 37–48.5)
HGB BLD-MCNC: 13.1 G/DL (ref 12–16)
HGB UR QL STRIP: NEGATIVE
IMM GRANULOCYTES # BLD AUTO: 0.11 K/UL (ref 0–0.04)
IMM GRANULOCYTES NFR BLD AUTO: 1 % (ref 0–0.5)
INR PPP: 0.9 (ref 0.8–1.2)
KETONES UR QL STRIP: NEGATIVE
LEUKOCYTE ESTERASE UR QL STRIP: NEGATIVE
LYMPHOCYTES # BLD AUTO: 0.8 K/UL (ref 1–4.8)
LYMPHOCYTES NFR BLD: 7.3 % (ref 18–48)
MCH RBC QN AUTO: 31.7 PG (ref 27–31)
MCHC RBC AUTO-ENTMCNC: 32.8 G/DL (ref 32–36)
MCV RBC AUTO: 97 FL (ref 82–98)
METHADONE UR QL SCN>300 NG/ML: NEGATIVE
MONOCYTES # BLD AUTO: 0.2 K/UL (ref 0.3–1)
MONOCYTES NFR BLD: 1.6 % (ref 4–15)
NEUTROPHILS # BLD AUTO: 9.6 K/UL (ref 1.8–7.7)
NEUTROPHILS NFR BLD: 89.9 % (ref 38–73)
NITRITE UR QL STRIP: NEGATIVE
NRBC BLD-RTO: 0 /100 WBC
OPIATES UR QL SCN: NEGATIVE
PCP UR QL SCN>25 NG/ML: NEGATIVE
PH UR STRIP: 6 [PH] (ref 5–8)
PLATELET # BLD AUTO: 208 K/UL (ref 150–350)
PMV BLD AUTO: 10.4 FL (ref 9.2–12.9)
POTASSIUM SERPL-SCNC: 4.8 MMOL/L (ref 3.5–5.1)
PROT SERPL-MCNC: 7.7 G/DL (ref 6–8.4)
PROT UR QL STRIP: NEGATIVE
PROTHROMBIN TIME: 9.6 SEC (ref 9–12.5)
RBC # BLD AUTO: 4.13 M/UL (ref 4–5.4)
SODIUM SERPL-SCNC: 138 MMOL/L (ref 136–145)
SP GR UR STRIP: <=1.005 (ref 1–1.03)
TOXICOLOGY INFORMATION: NORMAL
URN SPEC COLLECT METH UR: ABNORMAL
UROBILINOGEN UR STRIP-ACNC: NEGATIVE EU/DL
WBC # BLD AUTO: 10.62 K/UL (ref 3.9–12.7)

## 2019-11-10 PROCEDURE — 99291 CRITICAL CARE FIRST HOUR: CPT | Mod: 25

## 2019-11-10 PROCEDURE — 25500020 PHARM REV CODE 255: Performed by: FAMILY MEDICINE

## 2019-11-10 PROCEDURE — 85025 COMPLETE CBC W/AUTO DIFF WBC: CPT | Mod: ER

## 2019-11-10 PROCEDURE — A9585 GADOBUTROL INJECTION: HCPCS | Performed by: FAMILY MEDICINE

## 2019-11-10 PROCEDURE — 81003 URINALYSIS AUTO W/O SCOPE: CPT | Mod: ER

## 2019-11-10 PROCEDURE — 96361 HYDRATE IV INFUSION ADD-ON: CPT

## 2019-11-10 PROCEDURE — 63600175 PHARM REV CODE 636 W HCPCS: Performed by: FAMILY MEDICINE

## 2019-11-10 PROCEDURE — 80053 COMPREHEN METABOLIC PANEL: CPT | Mod: ER

## 2019-11-10 PROCEDURE — 81025 URINE PREGNANCY TEST: CPT | Mod: ER

## 2019-11-10 PROCEDURE — 51798 US URINE CAPACITY MEASURE: CPT | Mod: 51

## 2019-11-10 PROCEDURE — 63600175 PHARM REV CODE 636 W HCPCS: Mod: ER | Performed by: EMERGENCY MEDICINE

## 2019-11-10 PROCEDURE — 80307 DRUG TEST PRSMV CHEM ANLYZR: CPT | Mod: ER

## 2019-11-10 PROCEDURE — 96375 TX/PRO/DX INJ NEW DRUG ADDON: CPT | Mod: 59

## 2019-11-10 PROCEDURE — 51702 INSERT TEMP BLADDER CATH: CPT

## 2019-11-10 PROCEDURE — 85610 PROTHROMBIN TIME: CPT | Mod: ER

## 2019-11-10 PROCEDURE — 96374 THER/PROPH/DIAG INJ IV PUSH: CPT

## 2019-11-10 PROCEDURE — 85730 THROMBOPLASTIN TIME PARTIAL: CPT | Mod: ER

## 2019-11-10 RX ORDER — MORPHINE SULFATE 4 MG/ML
4 INJECTION, SOLUTION INTRAMUSCULAR; INTRAVENOUS
Status: COMPLETED | OUTPATIENT
Start: 2019-11-10 | End: 2019-11-10

## 2019-11-10 RX ORDER — DEXAMETHASONE SODIUM PHOSPHATE 4 MG/ML
10 INJECTION, SOLUTION INTRA-ARTICULAR; INTRALESIONAL; INTRAMUSCULAR; INTRAVENOUS; SOFT TISSUE ONCE
Status: DISCONTINUED | OUTPATIENT
Start: 2019-11-10 | End: 2019-11-10

## 2019-11-10 RX ORDER — SODIUM CHLORIDE 9 MG/ML
1000 INJECTION, SOLUTION INTRAVENOUS
Status: COMPLETED | OUTPATIENT
Start: 2019-11-10 | End: 2019-11-10

## 2019-11-10 RX ORDER — ONDANSETRON 2 MG/ML
4 INJECTION INTRAMUSCULAR; INTRAVENOUS ONCE
Status: COMPLETED | OUTPATIENT
Start: 2019-11-10 | End: 2019-11-10

## 2019-11-10 RX ORDER — DEXAMETHASONE SODIUM PHOSPHATE 4 MG/ML
10 INJECTION, SOLUTION INTRA-ARTICULAR; INTRALESIONAL; INTRAMUSCULAR; INTRAVENOUS; SOFT TISSUE ONCE
Status: COMPLETED | OUTPATIENT
Start: 2019-11-10 | End: 2019-11-10

## 2019-11-10 RX ORDER — GADOBUTROL 604.72 MG/ML
10 INJECTION INTRAVENOUS
Status: COMPLETED | OUTPATIENT
Start: 2019-11-10 | End: 2019-11-10

## 2019-11-10 RX ORDER — KETOROLAC TROMETHAMINE 30 MG/ML
30 INJECTION, SOLUTION INTRAMUSCULAR; INTRAVENOUS
Status: COMPLETED | OUTPATIENT
Start: 2019-11-10 | End: 2019-11-10

## 2019-11-10 RX ADMIN — SODIUM CHLORIDE 1000 ML: 0.9 INJECTION, SOLUTION INTRAVENOUS at 04:11

## 2019-11-10 RX ADMIN — KETOROLAC TROMETHAMINE 30 MG: 30 INJECTION, SOLUTION INTRAMUSCULAR; INTRAVENOUS at 10:11

## 2019-11-10 RX ADMIN — ONDANSETRON 4 MG: 2 INJECTION INTRAMUSCULAR; INTRAVENOUS at 04:11

## 2019-11-10 RX ADMIN — DEXAMETHASONE SODIUM PHOSPHATE 10 MG: 4 INJECTION, SOLUTION INTRA-ARTICULAR; INTRALESIONAL; INTRAMUSCULAR; INTRAVENOUS; SOFT TISSUE at 04:11

## 2019-11-10 RX ADMIN — GADOBUTROL 6 ML: 604.72 INJECTION INTRAVENOUS at 09:11

## 2019-11-10 RX ADMIN — MORPHINE SULFATE 4 MG: 4 INJECTION INTRAVENOUS at 04:11

## 2019-11-10 NOTE — TELEPHONE ENCOUNTER
Reason for Disposition   [1] Urinary or bowel incontinence (i.e., loss of bladder or bowel control) AND [2] new onset    Additional Information   Negative: Passed out (i.e., lost consciousness, collapsed and was not responding)   Negative: Shock suspected (e.g., cold/pale/clammy skin, too weak to stand, low BP, rapid pulse)   Negative: Sounds like a life-threatening emergency to the triager   Negative: [1] SEVERE back pain (e.g., excruciating) AND [2] sudden onset AND [3] age > 60   Negative: [1] Unable to urinate (or only a few drops) > 4 hours AND     [2] bladder feels very full (e.g., palpable bladder or strong urge to urinate)    Protocols used: BACK PAIN-A-AH    Pain 8/10, urinary incontinence. No urge to defecate. Usually goes everyday for bowel movements and she feels fullness in her abdomen. Numbness in the groin. She states she woke up this morning like this. She had a back injury in 2016, but denies complications from it much since then. No new back injuries recalled by Ms. Bonds. Advised her to go to the ED now. She verbalized understanding.

## 2019-11-10 NOTE — ED NOTES
Todd Lomax aware of ed to ed transfer . Dr. Blevins speaks with  in ed [facility] ed and accepts pt for transfer. fernando ed charge br made aware.

## 2019-11-10 NOTE — ED PROVIDER NOTES
History     Chief Complaint   Patient presents with    Back Pain     came her to our ER 11/8/19 for sciatica- taking Rx's steroids and muscle relaxers.    Urinary Retention     only voiding small amounts, and 2 episodes of urinary incontinence.  Not feeling the urge to have a bowel movement.       Review of patient's allergies indicates:  No Known Allergies    History of Present Illness   HPI    11/10/2019, 15:21 PM  The history is provided by the patient    Isabelle Bonds is a 39 y.o. female presenting to the ED for back pain and urinary symptoms. Patient was diagnosed on November 7th with sciatica.  She was discharged home with Flexeril and steroids.  Patient reports that on Wednesday, patient was sweeping her floor when she had sudden onset of back pain that brought her to her knees.  She has pain that radiates down the left back down the leg. The pain that she is experiencing is lumbar region L2-L3.  She reports that she has been compliant with her medications, however today she noticed worsening symptoms.  When she woke up this morning, she urinated.  It was difficult to start a stream.  She felt like her bladder incompletely emptied.  She feels like her abdomen is distended.  Pain is rated 8/10  The last urination she had was approximately at 7:00 a.m. this morning.  She states that she urinated on herself twice.  She felt the warmth of a urine on her skin.  She did realize that she had to go the bathroom.  Patient's also not feeling the urge to have a bowel movement.  Patient denies any prior history of trauma, IV drug use, abdominal pain, fever, unexpected weight changes.  Patient does report that she feels like her left leg is weak.  Patient does have a complicated prior medical history.  Patient had subarachnoid hemorrhage approximately 3 years ago. At baseline, she has  hyperreflexia to her left side including her arm and lower extremity.  Patient denies headache, nausea vomiting, neck pain.       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    Stroke        Past Surgical History:  Past Surgical History:   Procedure Laterality Date    BRAIN SURGERY      CHOLECYSTECTOMY      CRANIOTOMY      HERNIA REPAIR      HYSTERECTOMY           Family History:  Family History   Problem Relation Age of Onset    No Known Problems Mother     Arthritis Father        Social History:  Social History     Tobacco Use    Smoking status: Current Every Day Smoker     Packs/day: 0.50     Types: Cigarettes    Smokeless tobacco: Never Used   Substance and Sexual Activity    Alcohol use: Yes     Comment: occasional    Drug use: No    Sexual activity: Not Currently     Birth control/protection: See Surgical Hx        Review of Systems   Review of Systems   Constitutional: Negative for chills and fever.   HENT: Negative for sore throat.    Respiratory: Negative for shortness of breath.    Cardiovascular: Negative for chest pain.   Gastrointestinal: Negative for nausea and vomiting.   Genitourinary: Positive for decreased urine volume. Negative for dysuria, frequency and urgency.   Musculoskeletal: Positive for back pain. Negative for neck pain and neck stiffness.   Skin: Negative for rash.   Neurological: Positive for numbness (Left leg and rectal region). Negative for dizziness, speech difficulty, weakness and headaches.   Hematological: Does not bruise/bleed easily.        Physical Exam     Initial Vitals [11/10/19 1512]   BP Pulse Resp Temp SpO2   (!) 138/98 (!) 125 19 98.1 °F (36.7 °C) 99 %      MAP       --          Physical Exam    Nursing Notes and Vital Signs Reviewed.  Constitutional: Patient is in no apparent distress. Well-developed and  well-nourished.  Head: Atraumatic. Normocephalic.  Eyes: PERRL. EOM intact. Conjunctivae are not pale. No scleral icterus.  ENT: Mucous membranes are moist. Oropharynx is clear and symmetric.    Neck: Supple. Full ROM. No lymphadenopathy.  Cardiovascular:  Tachycardic.  No murmurs, rubs, or gallops. Distal pulses are 2+ and symmetric.  Pulmonary/Chest: No respiratory distress. Clear to auscultation bilaterally. No wheezing or rales.  Abdominal: Soft and non-distended.  There is no tenderness.  No rebound, guarding, or rigidity. Good bowel sounds.  Genitourinary: No CVA tenderness.  There is some perirectal paresthesia which is present.  There is some dilatation of the rectal region.  Musculoskeletal: Moves all extremities. No obvious deformities. No edema. No calf tenderness.  Cervical spine:  No midline cervical spine tenderness noted.  Thoracic spine:  No midline thoracic spine tenderness noted.  Lumbar spine:  There is tenderness palpation midline L2, L3.  No bony step-off appreciated. No warmth or erythema appreciated.  Skin: Warm and dry.  Neurological:  Alert, awake, and appropriate.  Normal speech.  No acute focal neurological deficits are appreciated. Cranial nerves 2-12 intact.  Biceps reflex +3/4 on the right.  Hyperreflexia is noted. On the left, it is 2/4.  No hyper -reflexive a noted  Patellar reflex:  +3/4 on the right, hyperreflexia is noted. On the left, it is of for, hyper-reflexive has noted  Proprioception is intact to lower extremities.  Light touch is intact.  Clonus is present to the right lower extremity 3-4 beat.  1-2 beat clonus is present in the left lower extremity.  Right lower extremity strength +5/5 both plantar flexion and dorsiflexion blood  Left lower extremity + 4/5 dorsiflexion, 4-5 plantar flexion  Psychiatric: Normal affect. Good eye contact. Appropriate in content.     ED Course     Critical Care  Date/Time: 11/10/2019 3:21 PM  Performed by: Sammi Blevins DO  Authorized by:  "Sammi Blevins DO   Direct patient critical care time: 20 minutes  Additional history critical care time: 1 minutes  Ordering / reviewing critical care time: 8 minutes  Documentation critical care time: 10 minutes  Consulting other physicians critical care time: 10 minutes  Total critical care time (exclusive of procedural time) : 49 minutes  Critical care was necessary to treat or prevent imminent or life-threatening deterioration of the following conditions: CNS failure or compromise.  Critical care was time spent personally by me on the following activities: development of treatment plan with patient or surrogate, discussions with consultants, interpretation of cardiac output measurements, evaluation of patient's response to treatment, examination of patient, obtaining history from patient or surrogate, ordering and performing treatments and interventions, ordering and review of laboratory studies, ordering and review of radiographic studies, pulse oximetry, re-evaluation of patient's condition, review of old charts and vascular access procedures.          ED Vital Signs:  Vitals:    11/10/19 1512 11/10/19 1630   BP: (!) 138/98 (!) 125/91   Pulse: (!) 125 (!) 115   Resp: 19 18   Temp: 98.1 °F (36.7 °C)    TempSrc: Oral    SpO2: 99% 99%   Weight: 60.7 kg (133 lb 13.1 oz)    Height: 5' 2" (1.575 m)        Abnormal Lab Results:  Labs Reviewed   CBC W/ AUTO DIFFERENTIAL - Abnormal; Notable for the following components:       Result Value    Mean Corpuscular Hemoglobin 31.7 (*)     Immature Granulocytes 1.0 (*)     Gran # (ANC) 9.6 (*)     Immature Grans (Abs) 0.11 (*)     Lymph # 0.8 (*)     Mono # 0.2 (*)     Gran% 89.9 (*)     Lymph% 7.3 (*)     Mono% 1.6 (*)     All other components within normal limits   COMPREHENSIVE METABOLIC PANEL - Abnormal; Notable for the following components:    CO2 18 (*)     All other components within normal limits   URINALYSIS, REFLEX TO URINE CULTURE - Abnormal; Notable for the " following components:    Specific Gravity, UA <=1.005 (*)     All other components within normal limits    Narrative:     Preferred Collection Type->Urine, Clean Catch   PROTIME-INR   PREGNANCY TEST, URINE RAPID   DRUG SCREEN PANEL, URINE EMERGENCY   DRUG SCREEN PANEL, URINE EMERGENCY    Narrative:     Preferred Collection Type->Urine, Clean Catch   APTT        All Lab Results:  Results for orders placed or performed during the hospital encounter of 11/10/19   CBC auto differential   Result Value Ref Range    WBC 10.62 3.90 - 12.70 K/uL    RBC 4.13 4.00 - 5.40 M/uL    Hemoglobin 13.1 12.0 - 16.0 g/dL    Hematocrit 39.9 37.0 - 48.5 %    Mean Corpuscular Volume 97 82 - 98 fL    Mean Corpuscular Hemoglobin 31.7 (H) 27.0 - 31.0 pg    Mean Corpuscular Hemoglobin Conc 32.8 32.0 - 36.0 g/dL    RDW 11.9 11.5 - 14.5 %    Platelets 208 150 - 350 K/uL    MPV 10.4 9.2 - 12.9 fL    Immature Granulocytes 1.0 (H) 0.0 - 0.5 %    Gran # (ANC) 9.6 (H) 1.8 - 7.7 K/uL    Immature Grans (Abs) 0.11 (H) 0.00 - 0.04 K/uL    Lymph # 0.8 (L) 1.0 - 4.8 K/uL    Mono # 0.2 (L) 0.3 - 1.0 K/uL    Eos # 0.0 0.0 - 0.5 K/uL    Baso # 0.02 0.00 - 0.20 K/uL    nRBC 0 0 /100 WBC    Gran% 89.9 (H) 38.0 - 73.0 %    Lymph% 7.3 (L) 18.0 - 48.0 %    Mono% 1.6 (L) 4.0 - 15.0 %    Eosinophil% 0.0 0.0 - 8.0 %    Basophil% 0.2 0.0 - 1.9 %    Differential Method Automated    Comprehensive metabolic panel   Result Value Ref Range    Sodium 138 136 - 145 mmol/L    Potassium 4.8 3.5 - 5.1 mmol/L    Chloride 110 95 - 110 mmol/L    CO2 18 (L) 23 - 29 mmol/L    Glucose 104 70 - 110 mg/dL    BUN, Bld 12 6 - 20 mg/dL    Creatinine 0.7 0.5 - 1.4 mg/dL    Calcium 9.5 8.7 - 10.5 mg/dL    Total Protein 7.7 6.0 - 8.4 g/dL    Albumin 4.2 3.5 - 5.2 g/dL    Total Bilirubin 0.1 0.1 - 1.0 mg/dL    Alkaline Phosphatase 67 55 - 135 U/L    AST 32 10 - 40 U/L    ALT 41 10 - 44 U/L    Anion Gap 10 8 - 16 mmol/L    eGFR if African American >60.0 >60 mL/min/1.73 m^2    eGFR if non  African American >60.0 >60 mL/min/1.73 m^2   Protime-INR   Result Value Ref Range    Prothrombin Time 9.6 9.0 - 12.5 sec    INR 0.9 0.8 - 1.2   Urinalysis, Reflex to Urine Culture Urine, Clean Catch   Result Value Ref Range    Specimen UA Urine, Clean Catch     Color, UA Yellow Yellow, Straw, Jessy    Appearance, UA Clear Clear    pH, UA 6.0 5.0 - 8.0    Specific Gravity, UA <=1.005 (A) 1.005 - 1.030    Protein, UA Negative Negative    Glucose, UA Negative Negative    Ketones, UA Negative Negative    Bilirubin (UA) Negative Negative    Occult Blood UA Negative Negative    Nitrite, UA Negative Negative    Urobilinogen, UA Negative <2.0 EU/dL    Leukocytes, UA Negative Negative   Pregnancy, urine rapid   Result Value Ref Range    Preg Test, Ur Negative    Drug screen panel, emergency   Result Value Ref Range    Benzodiazepines Negative     Methadone metabolites Negative     Cocaine (Metab.) Negative     Opiate Scrn, Ur Negative     Barbiturate Screen, Ur Negative     Amphetamine Screen, Ur Negative     THC Presumptive Positive     Phencyclidine Negative     Creatinine, Random Ur 29.7 15.0 - 325.0 mg/dL    Toxicology Information SEE COMMENT              Imaging Results:  Imaging Results          CT Head Without Contrast (Final result)  Result time 11/10/19 16:30:30    Final result by Kashif Shabazz MD (11/10/19 16:30:30)                 Impression:      1.  Negative for acute intracranial process. Negative for hemorrhage, or skull fracture.    2.  Stable atrophy, intracranial atherosclerotic disease, small vessel ischemic changes, as well as postoperative changes to the right temporal bone and encephalomalacia in the low right temporal lobe and lateral parietal lobe with dystrophic calcifications.    3.  Stable findings as noted above.    All CT scans at this facility are performed  using dose modulation techniques as appropriate to performed exam including the following:  automated exposure control; adjustment of mA  and/or kV according to the patients size (this includes techniques or standardized protocols for targeted exams where dose is matched to indication/reason for exam: i.e. extremities or head);  iterative reconstruction technique.      Electronically signed by: Kashif Shabazz MD  Date:    11/10/2019  Time:    16:30             Narrative:    EXAMINATION:  CT HEAD WITHOUT CONTRAST    CLINICAL HISTORY:  Other symptoms and signs involving the musculoskeletal systemleft leg weakness - prior brain aneurysms;    TECHNIQUE:  Axial images through the brain and posterior fossa were obtained without the use of IV contrast.  Sagittal and coronal reconstructions are provided for review.    COMPARISON:  December 4, 2018    FINDINGS:  The ventricles are midline and the CSF spaces are prominent.  Again seen is an area of encephalomalacia involving the right temporal and lateral inferior parietal lobe with dystrophic calcification present and ex vacuo dilation of the temporal horn of the right lateral ventricle.  The gray-white matter junction is otherwise well preserved. Negative for intracranial vascular abnormalities. Negative for mass, mass effect, cerebral edema, hemorrhage or abnormal fluid collections.  Intracranial atherosclerotic disease.  Small vessel ischemic changes.  Falx calcifications.    Stable postoperative changes to the right temporal bone.    The skull and scalp are otherwise intact.    Leftward nasal septal deviation.  The   paranasal sinuses, mastoid air cells, middle ears and ear canals are clear. The globes are intact.                                 The Emergency Provider reviewed the vital signs and test results, which are outlined above.     ED Discussion     ED Course as of Nov 10 1632   Sun Nov 10, 2019   1351 Phone call with Dr. DEEPIKA Hoffman (Neurosurgery).   He recommends CT of the Head without contrast (secondary to hx of SAH), MRI of the Cervical Spine without, MRI of the Thoracic Spine, and MRI of the  Lumbar Spine.   Give 10 mg of IV Decadron.    [LB]   1547 Discussed with patient that I am concerned about cauda equina syndrome.  I believe that patient needs an emergent MRI.  Patient does not have any contraindication to MRI.  Our facility in [facility] does not have capability of performing MRI.   Patient is aware that [facility] has services capable of caring for her.  Will contact Dr. Hoffman.    [LB]   1606 Catheter = post void = 600 ml.      [LB]   1606 Case discussed with Dr. Valdez (ED physician at [facility]).  She accepts the patient at [facility].    [LB]   1607 4:10 PM  Patient/Family requests transfer to: [facility] ED.    All historical, clinical, radiographic, and laboratory findings were reviewed with the patient/family in detail.  I discussed the indications and treatment need (MRI) for transfer to an  our facility in [place] secondary to MRI of spine.  Patient/family verbalized understanding.   All remaining questions and concerns were addressed at that time and the patient/family agrees to proceed accordingly.  Similarly all pertinent details of the encounter were discussed with Dr. Angelica Valdez at [facility] ED who agrees to accept the patient in transfer based on the needs/patient preferences outlined above.  Patient will be transferred by [facility] services secondary to a need for ongoing Fluids, bedrest, cardiac monitoring en route.  Risks: of transfer:    loss of vitals signs, permanent neurologic damage, MVC, resulting in death, or loss of neurologic function.  Benefits of transfer: MRI and Neurosurgical services.  Patient and family agree and verbalize understanding.     Sammi Blevins DO DO, FACEP            [LB]      ED Course User Index  [LB] Sammi Blevins DO                I discussed with patient and/or family/caretaker that evaluation in the ED does not suggest any emergent or life threatening medical conditions requiring immediate intervention beyond what was provided in the ED, and I  believe patient is safe for discharge.  Regardless, an unremarkable evaluation in the ED does not preclude the development or presence of a serious of life threatening condition. As such, patient was instructed to return immediately for any worsening or change in current symptoms.      ED Medication(s):  Medications   dexamethasone injection 10 mg (10 mg Intravenous Given 11/10/19 1630)   0.9%  NaCl infusion (1,000 mLs Intravenous New Bag 11/10/19 1629)   morphine injection 4 mg (4 mg Intravenous Given 11/10/19 1634)   ondansetron injection 4 mg (4 mg Intravenous Given 11/10/19 1634)          Medication List      ASK your doctor about these medications    cholecalciferol (vitamin D3) 2,000 unit Tab  Commonly known as:  VITAMIN D3     clonazePAM 1 MG tablet  Commonly known as:  KLONOPIN     cyclobenzaprine 10 MG tablet  Commonly known as:  FLEXERIL  Take 1 tablet (10 mg total) by mouth 3 (three) times daily as needed for Muscle spasms.     erenumab-aooe 70 mg/mL injection  Commonly known as:  AIMOVIG AUTOINJECTOR     folic acid 1 MG tablet  Commonly known as:  FOLVITE     gabapentin 300 MG capsule  Commonly known as:  NEURONTIN     loratadine 10 mg tablet  Commonly known as:  CLARITIN     naproxen 375 MG tablet  Commonly known as:  NAPROSYN  Take 1 tablet (375 mg total) by mouth 2 (two) times daily with meals.     paroxetine 40 MG tablet  Commonly known as:  PAXIL     predniSONE 20 MG tablet  Commonly known as:  DELTASONE  Take 2 tablets (40 mg total) by mouth once daily. for 5 days     ranitidine 150 MG tablet  Commonly known as:  ZANTAC                     MIPS Measures     Smoker? Yes     Hypertension: Pre-hypertension/Hypertension: The pt has been informed that they may have pre-hypertension or hypertension based on a blood pressure reading in the ED. I recommend that the pt call the PCP listed on their discharge instructions or a physician of their choice this week to arrange f/u for further evaluation of  "possible pre-hypertension or hypertension.        Medical Decision Making     Medical Decision Making:   Initial Assessment:   Patient is a 39-year-old female presenting to emergency department with urinary retention.  Past medical history is significant for recent diagnosis of sciatica.  Patient denies any IV drug use, trauma, previous history of cancer.  Patient has baseline neurologic changes including hyperreflexia of the left upper extremity as well as the left lower extremity secondary to brain injury due to spontaneous subarachnoid hemorrhage.  Differential Diagnosis:   Anti cholinergic urinary retention secondary to Flexeril, cauda equina syndrome, less likely abscess  ED Management:  I discussed my concerns with the patient.  She is requesting transfer to [facility] .  Case was discussed with Neurosurgery (Dr. Hoffman) who recommended stat head CT, Decadron 10 mg IV, MRI of the cervical spine, lumbar spine, and thoracic spine without contrast.  As we do not have capabilities of MRI at this facility, patient is requesting transfer to our facility in [place].  A stat transport was requested.  Case was discussed with Dr. Angelica Valdez who accepted patient in the ED as transfer.  Other:   I have discussed this case with another health care provider.       Additional MDM:   Smoking Cessation: The patient is a smoker. The patient was counseled on smoking cessation for: 3 minutes. The patient was counseled on tobacco related  health complications.        MDM  Reviewed: vitals, nursing note and previous chart        Portions of this note may have been created with voice recognition software. Occasional "wrong-word" or "sound-a-like" substitutions may have occurred due to the inherent limitations of voice recognition software. Please, read the note carefully and recognize, using context, where substitutions have occurred.        Clinical Impression       ICD-10-CM ICD-9-CM   1. Urinary retention, postvoid residual 600 mL " R33.9 788.20   2. Left leg weakness R29.898 729.89   3. Acute midline low back pain with left-sided sciatica M54.42 724.2     724.3            Disposition: Transfer to [facility] to Dr. Valdez.  Patient condition: Hillary Blevins DO  11/10/19 1706

## 2019-11-11 NOTE — ED NOTES
Discharged instructions given to pt. Pt verbalized understanding. Joseph taken out. Advised of retention post joseph removal. Pt given paper scrubs since she did not  Have her clothes. Pt walked out with her mom.

## 2019-11-11 NOTE — ED NOTES
Patient assessed. Laying in bed without distress. nuero assessment done. Pt stated that she is still having numbness and tingling on her LLE.  There is present of sensation on all extremities. No weakness noted. I will continue to monitor

## 2019-11-11 NOTE — ED NOTES
Rec'd patient to bed 3 as transfer from [facility].  Pt is AAOx3.  She has NS infusing to gravity, joseph in place draining clear yellow urine.  States she has been having lower back and has not been able to control her bladder or have sensation for bowel movement since this morning. States some numbness to LLE.

## 2019-11-11 NOTE — PROVIDER PROGRESS NOTES - EMERGENCY DEPT.
Encounter Date: 11/10/2019    ED Physician Progress Notes       SCRIBE NOTE: I, Stephanie Perez, am scribing for, and in the presence of,  Garima Flores MD.  Physician Statement: Garima CALVILLO MD, personally performed the services described in this documentation as scribed by Stephanie Perez in my presence, and it is both accurate and complete.            History     Chief Complaint   Patient presents with    Back Pain     came her to our ER 11/8/19 for sciatica- taking Rx's steroids and muscle relaxers.    Urinary Retention     only voiding small amounts, and 2 episodes of urinary incontinence.  Not feeling the urge to have a bowel movement.     Review of patient's allergies indicates:  No Known Allergies      History of Present Illness     HPI    11/10/2019, 5:49 PM  History obtained from the patient      History of Present Illness: Isabelle Bonds is a 39 y.o. female patient with a PMHx of anxiety, cerebral aneurysm rupture, chronic HA, pseudoseizures, and stroke who presents to the Emergency Department for evaluation of back pain which onset gradually 4 days ago. Pt notes she was dx with sciatica on 11/7 where she was d/c with Flexeril, Naproxen, and Prednisone. Symptoms are constant and moderate in severity. No mitigating or exacerbating factors reported. Associated sxs include difficulty urinating. Patient denies any fever, chills, SOB, CP, abd pain, n/v, dysuria, urinary frequency/urgency, dizziness, HA, neck pain, neck stiffness, extremity weakness, and all other sxs at this time. No further complaints or concerns at this time. Pt was seen and evaluated at [facility] ED where she was sent to [facility] for a MRI. Dr. Blevins (ED) spoke with Dr. Hoffman (Neurosurgery) who recommends a MRI pf C-spine, T-spine, L-spine without contrast and keep pt NPO.       Arrival mode: AASI        Imaging Results:  Imaging Results          MRI Lumbar Spine W WO Cont (In process)  Result time  11/10/19 23:00:28   Procedure changed from MRI Lumbar Spine Without Contrast                MRI Thoracic Spine Without Contrast (In process)                MRI Cervical Spine Without Contrast (In process)                CT Head Without Contrast (Final result)  Result time 11/10/19 16:30:30    Final result by Kashif Shabazz MD (11/10/19 16:30:30)                 Impression:      1.  Negative for acute intracranial process. Negative for hemorrhage, or skull fracture.    2.  Stable atrophy, intracranial atherosclerotic disease, small vessel ischemic changes, as well as postoperative changes to the right temporal bone and encephalomalacia in the low right temporal lobe and lateral parietal lobe with dystrophic calcifications.    3.  Stable findings as noted above.    All CT scans at this facility are performed  using dose modulation techniques as appropriate to performed exam including the following:  automated exposure control; adjustment of mA and/or kV according to the patients size (this includes techniques or standardized protocols for targeted exams where dose is matched to indication/reason for exam: i.e. extremities or head);  iterative reconstruction technique.      Electronically signed by: Kashif Shabazz MD  Date:    11/10/2019  Time:    16:30             Narrative:    EXAMINATION:  CT HEAD WITHOUT CONTRAST    CLINICAL HISTORY:  Other symptoms and signs involving the musculoskeletal systemleft leg weakness - prior brain aneurysms;    TECHNIQUE:  Axial images through the brain and posterior fossa were obtained without the use of IV contrast.  Sagittal and coronal reconstructions are provided for review.    COMPARISON:  December 4, 2018    FINDINGS:  The ventricles are midline and the CSF spaces are prominent.  Again seen is an area of encephalomalacia involving the right temporal and lateral inferior parietal lobe with dystrophic calcification present and ex vacuo dilation of the temporal horn of the right  lateral ventricle.  The gray-white matter junction is otherwise well preserved. Negative for intracranial vascular abnormalities. Negative for mass, mass effect, cerebral edema, hemorrhage or abnormal fluid collections.  Intracranial atherosclerotic disease.  Small vessel ischemic changes.  Falx calcifications.    Stable postoperative changes to the right temporal bone.    The skull and scalp are otherwise intact.    Leftward nasal septal deviation.  The   paranasal sinuses, mastoid air cells, middle ears and ear canals are clear. The globes are intact.                                10:48 PM: Per [facility], pt's MRI L spine results: There is mild degenerative disc disease at L4-5 and minimal degenerative disc disease at T11-12. There is not extrusion. There is not canal or foraminal stenosis. There is not abnormal clumping or enhancement of concern for cauda equina syndrome.    10:53 PM: Per [facility], pt's MRI T spine results: There is not radiographically significant degenerative disc disease over the thoracic spine. There is not acute bony pathology. There is not canal or foraminal stenosis or cord contusion or edema.    11:03 PM: Per [facility], pt's MRI C spine results: No disc extrusion. Mild multilevel degenerative disc disease most pronounced at C5-6 where there is borderline to mild spinal canal stenosis without cord contusion or edema. No other canal or foraminal stenosis.         ED Discussion       11:19 PM: Reassessed pt at this time. Discussed with pt all pertinent ED information and results. Discussed pt dx and plan of tx. Gave pt all f/u and return to the ED instructions. All questions and concerns were addressed at this time. Pt expresses understanding of information and instructions, and is comfortable with plan to discharge. Pt is stable for discharge.    I discussed with patient and/or family/caretaker that evaluation in the ED does not suggest any emergent or life threatening  medical conditions requiring immediate intervention beyond what was provided in the ED, and I believe patient is safe for discharge.  Regardless, an unremarkable evaluation in the ED does not preclude the development or presence of a serious of life threatening condition. As such, patient was instructed to return immediately for any worsening or change in current symptoms.        Medications   dexamethasone injection 10 mg (10 mg Intravenous Given 11/10/19 1630)   0.9%  NaCl infusion (0 mLs Intravenous Stopped 11/10/19 1934)   morphine injection 4 mg (4 mg Intravenous Given 11/10/19 1634)   ondansetron injection 4 mg (4 mg Intravenous Given 11/10/19 1634)   gadobutrol (GADAVIST) injection 10 mL (6 mLs Intravenous Given 11/10/19 2149)   ketorolac injection 30 mg (30 mg Intravenous Given 11/10/19 2259)       Medical Decision Making:   Clinical Tests:   Radiological Study: Ordered and Reviewed    Current Discharge Medication List              ICD-10-CM ICD-9-CM   1. Urinary retention, postvoid residual 600 mL R33.9 788.20   2. Left leg weakness R29.898 729.89   3. Acute midline low back pain with left-sided sciatica M54.42 724.2     724.3       Disposition:   Disposition: Discharged  Condition: Stable

## 2020-01-22 ENCOUNTER — HOSPITAL ENCOUNTER (OUTPATIENT)
Dept: RADIOLOGY | Facility: HOSPITAL | Age: 40
Discharge: HOME | End: 2020-01-22
Attending: FAMILY MEDICINE
Payer: MEDICAID

## 2020-01-22 DIAGNOSIS — K59.00 CN (CONSTIPATION): Primary | ICD-10-CM

## 2020-01-22 DIAGNOSIS — K59.00 CN (CONSTIPATION): ICD-10-CM

## 2020-01-22 PROCEDURE — 74019 RADEX ABDOMEN 2 VIEWS: CPT | Mod: 26,,, | Performed by: RADIOLOGY

## 2020-01-22 PROCEDURE — 74019 RADEX ABDOMEN 2 VIEWS: CPT | Mod: TC,PO

## 2020-01-22 PROCEDURE — 74019 XR ABDOMEN FLAT AND ERECT: ICD-10-PCS | Mod: 26,,, | Performed by: RADIOLOGY

## 2020-08-10 ENCOUNTER — HOSPITAL ENCOUNTER (EMERGENCY)
Facility: HOSPITAL | Age: 40
Discharge: HOME | End: 2020-08-10
Attending: EMERGENCY MEDICINE
Payer: MEDICAID

## 2020-08-10 VITALS
DIASTOLIC BLOOD PRESSURE: 92 MMHG | OXYGEN SATURATION: 98 % | WEIGHT: 129.19 LBS | RESPIRATION RATE: 16 BRPM | HEART RATE: 108 BPM | TEMPERATURE: 98 F | BODY MASS INDEX: 23.63 KG/M2 | SYSTOLIC BLOOD PRESSURE: 129 MMHG

## 2020-08-10 DIAGNOSIS — Z87.448 HISTORY OF CYSTOCELE: Primary | ICD-10-CM

## 2020-08-10 DIAGNOSIS — N81.10 FALLEN BLADDER: ICD-10-CM

## 2020-08-10 LAB
BILIRUB UR QL STRIP: ABNORMAL
CLARITY UR REFRACT.AUTO: ABNORMAL
COLOR UR AUTO: YELLOW
GLUCOSE UR QL STRIP: NEGATIVE
HGB UR QL STRIP: NEGATIVE
KETONES UR QL STRIP: NEGATIVE
LEUKOCYTE ESTERASE UR QL STRIP: NEGATIVE
NITRITE UR QL STRIP: NEGATIVE
PH UR STRIP: 5 [PH] (ref 5–8)
PROT UR QL STRIP: NEGATIVE
SP GR UR STRIP: >=1.03 (ref 1–1.03)
URN SPEC COLLECT METH UR: ABNORMAL
UROBILINOGEN UR STRIP-ACNC: NEGATIVE EU/DL

## 2020-08-10 PROCEDURE — 99283 EMERGENCY DEPT VISIT LOW MDM: CPT | Mod: 25,ER

## 2020-08-10 PROCEDURE — 81003 URINALYSIS AUTO W/O SCOPE: CPT | Mod: ER

## 2020-08-10 PROCEDURE — 51798 US URINE CAPACITY MEASURE: CPT | Mod: ER

## 2020-08-10 PROCEDURE — P9612 CATHETERIZE FOR URINE SPEC: HCPCS | Mod: ER

## 2020-08-10 RX ORDER — LACTULOSE 10 G/15ML
10 SOLUTION ORAL 2 TIMES DAILY
Qty: 150 ML | Refills: 0 | Status: SHIPPED | OUTPATIENT
Start: 2020-08-10

## 2020-08-10 NOTE — ED PROVIDER NOTES
"Encounter Date: 8/10/2020       History     Chief Complaint   Patient presents with    Constipation     "problems going to bathroom both ways for a year"     Chief complaint:  Difficulty urinating and having a bowel movement    History of present illness:  40-year-old female states that she is having difficulty urinating and having a bowel movement for over a year.  The patient has had a previous total hysterectomy.  States she was diagnosed with a cystocele and rectocele.  The patient advises me she saw a gynecologist but would not allow the gynecologist to examine her and gave the gynecologist of photo of her vaginal area and was unable to formulate a plan.  She denies fever or chills.  She denies any dysuria when she does urinate.  Severity of symptom is vague but appears to be moderate and intermittent.  No complaints of numbness or tingling to the perineum.  Denies any flank pain.  No recent treatment of either her urinary retention or constipation has been given.        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/2017    Stroke      Past Surgical History:   Procedure Laterality Date    BRAIN SURGERY      CHOLECYSTECTOMY      CRANIOTOMY      HERNIA REPAIR      HYSTERECTOMY       Family History   Problem Relation Age of Onset    No Known Problems Mother     Arthritis Father      Social History     Tobacco Use    Smoking status: Current Every Day Smoker     Packs/day: 0.50     Types: Cigarettes    Smokeless tobacco: Never Used   Substance Use Topics    Alcohol use: Yes     Comment: occasional    Drug use: No     Review of Systems   Constitutional: Negative.    HENT: Negative.    Respiratory: Negative.    Cardiovascular: Negative.    Gastrointestinal: Positive for constipation.   Genitourinary: Positive for decreased urine volume and " difficulty urinating. Negative for dysuria, enuresis, flank pain, frequency and hematuria.   Musculoskeletal: Negative.    Skin: Negative.    Neurological: Negative.    Psychiatric/Behavioral: Negative.    All other systems reviewed and are negative.      Physical Exam     Initial Vitals [08/10/20 0909]   BP Pulse Resp Temp SpO2   (!) 129/92 108 16 98.3 °F (36.8 °C) 98 %      MAP       --         Physical Exam    Nursing note and vitals reviewed.  Constitutional: She appears well-developed and well-nourished. She is active and cooperative.  Non-toxic appearance. She does not have a sickly appearance. She does not appear ill. No distress.   HENT:   Head: Normocephalic and atraumatic.   Right Ear: External ear normal.   Left Ear: External ear normal.   Nose: Nose normal.   Mouth/Throat: Oropharynx is clear and moist.   Eyes: Conjunctivae and EOM are normal.   Neck: Normal range of motion. Neck supple.   No meningeal signs   Cardiovascular: Regular rhythm and intact distal pulses.   Tachycardia   Pulmonary/Chest: No respiratory distress.   Abdominal: Soft. Bowel sounds are normal. She exhibits no distension. There is no abdominal tenderness. There is no rebound and no guarding.   Genitourinary:    Genitourinary Comments: Pt refuses     Musculoskeletal: Normal range of motion.   Lymphadenopathy:     She has no cervical adenopathy.   Neurological: She is alert.   Skin: Skin is warm and dry.   Psychiatric: She has a normal mood and affect.         ED Course   Procedures  Labs Reviewed   URINALYSIS, REFLEX TO URINE CULTURE          Imaging Results    None          Medical Decision Making:   Clinical Tests:   Lab Tests: Ordered and Reviewed  ED Management:  Pt symptoms for one year plus mainly with urinary issues as opposed to the constipation. She had seen gynecologist but would NOT let the gynecologist examine her taking a picture instead of allowing direct visualization. ( She felt the Gyn was aggravated thkat she would  not allow vag exam). Requests no vag exam this visit as well! Advised cannot make full diagnostic impression without exam  Pt advised that this will impede outpatient workup and she understands.  Straight cath yieleded only 15 ml of urine and patient advised to drink more fluids to see if she would urinate more freely.  Agrees with treatment plan                                 Clinical Impression:                  ICD-10-CM ICD-9-CM   1. History of cystocele  Z87.448 V13.09   2. Fallen bladder  N81.10 618.01          Raz Bonds MD  08/12/20 1353       Raz Bonds MD  08/19/20 0502

## 2020-09-17 ENCOUNTER — HOSPITAL ENCOUNTER (EMERGENCY)
Facility: HOSPITAL | Age: 40
Discharge: HOME | End: 2020-09-17
Attending: EMERGENCY MEDICINE
Payer: MEDICAID

## 2020-09-17 VITALS
TEMPERATURE: 99 F | RESPIRATION RATE: 18 BRPM | DIASTOLIC BLOOD PRESSURE: 83 MMHG | HEART RATE: 88 BPM | SYSTOLIC BLOOD PRESSURE: 136 MMHG | BODY MASS INDEX: 20.77 KG/M2 | OXYGEN SATURATION: 100 % | WEIGHT: 113.56 LBS

## 2020-09-17 DIAGNOSIS — K59.00 CONSTIPATION: ICD-10-CM

## 2020-09-17 DIAGNOSIS — M54.42 CHRONIC LEFT-SIDED LOW BACK PAIN WITH LEFT-SIDED SCIATICA: Primary | ICD-10-CM

## 2020-09-17 DIAGNOSIS — G89.29 CHRONIC LEFT-SIDED LOW BACK PAIN WITH LEFT-SIDED SCIATICA: Primary | ICD-10-CM

## 2020-09-17 PROCEDURE — 63600175 PHARM REV CODE 636 W HCPCS: Mod: ER | Performed by: EMERGENCY MEDICINE

## 2020-09-17 PROCEDURE — 99284 EMERGENCY DEPT VISIT MOD MDM: CPT | Mod: 25,ER

## 2020-09-17 PROCEDURE — 96372 THER/PROPH/DIAG INJ SC/IM: CPT | Mod: ER

## 2020-09-17 PROCEDURE — 25000003 PHARM REV CODE 250: Mod: ER | Performed by: EMERGENCY MEDICINE

## 2020-09-17 PROCEDURE — 51798 US URINE CAPACITY MEASURE: CPT | Mod: ER

## 2020-09-17 RX ORDER — KETOROLAC TROMETHAMINE 30 MG/ML
30 INJECTION, SOLUTION INTRAMUSCULAR; INTRAVENOUS
Status: COMPLETED | OUTPATIENT
Start: 2020-09-17 | End: 2020-09-17

## 2020-09-17 RX ORDER — GABAPENTIN 300 MG/1
600 CAPSULE ORAL
Status: COMPLETED | OUTPATIENT
Start: 2020-09-17 | End: 2020-09-17

## 2020-09-17 RX ORDER — HYDROMORPHONE HYDROCHLORIDE 2 MG/ML
1 INJECTION, SOLUTION INTRAMUSCULAR; INTRAVENOUS; SUBCUTANEOUS
Status: COMPLETED | OUTPATIENT
Start: 2020-09-17 | End: 2020-09-17

## 2020-09-17 RX ADMIN — HYDROMORPHONE HYDROCHLORIDE 1 MG: 2 INJECTION, SOLUTION INTRAMUSCULAR; INTRAVENOUS; SUBCUTANEOUS at 05:09

## 2020-09-17 RX ADMIN — GABAPENTIN 600 MG: 300 CAPSULE ORAL at 06:09

## 2020-09-17 RX ADMIN — KETOROLAC TROMETHAMINE 30 MG: 30 INJECTION, SOLUTION INTRAMUSCULAR at 05:09

## 2020-09-17 NOTE — ED PROVIDER NOTES
"Encounter Date: 9/17/2020       History     Chief Complaint   Patient presents with    Leg Swelling     c/o left leg swelling and left buttock swelling w/ bilateral leg weakness    General Illness     patient states unable to "pee or poo"      Patient is a 40-year-old female who presents today with multiple complaints.  Her chief complaint is left lower extremity pain.  Pain is intermittent and shooting in nature.  Patient does have a diagnosis of sciatica and has been prescribed gabapentin.  She states she was taking 600 t.i.d., but stop taking her medication today.  She denies any back pain or new trauma.  She has had multiple ED visits for similar symptoms in the past.  She has had a workup for cauda equina which involved an MRI and showed no evidence of cauda equina.  Patient has associated symptoms include decreased urination frequency and difficulty having bowel movements.  The symptoms have been ongoing for years.  Patient states that an OBGYN told her that she may have a rectocele and cystocele, but patient states she would not allow OBGYN to perform a  physical exam.  Last bowel movement yesterday.  Last urination this morning.  Patient believes that her urine and feces are going inside her left leg causing the pain.  She also reports shooting pains in her left upper extremity.  She reports that she is having pain, but that the left upper extremity is also numb at the same time.  She also reports that her abdomen is numb.         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/2017    Stroke      Past Surgical History:   Procedure Laterality Date    BRAIN SURGERY      CHOLECYSTECTOMY      CRANIOTOMY      HERNIA REPAIR      HYSTERECTOMY       Family History   Problem Relation Age of Onset    No Known Problems Mother     Arthritis Father  "     Social History     Tobacco Use    Smoking status: Current Every Day Smoker     Packs/day: 0.50     Types: Cigarettes    Smokeless tobacco: Never Used   Substance Use Topics    Alcohol use: Yes     Comment: occasional    Drug use: No     Review of Systems   Constitutional: Negative for chills, diaphoresis and fever.   HENT: Negative for congestion, rhinorrhea and sore throat.    Eyes: Negative for pain, redness and visual disturbance.   Respiratory: Negative for cough and shortness of breath.    Cardiovascular: Negative for chest pain, palpitations and leg swelling.   Gastrointestinal: Positive for constipation. Negative for abdominal distention, abdominal pain, blood in stool, diarrhea, nausea and vomiting.   Genitourinary: Positive for decreased urine volume. Negative for dysuria and hematuria.   Musculoskeletal: Negative for arthralgias and joint swelling.        LLE pain   Skin: Negative for rash and wound.   Neurological: Positive for numbness. Negative for seizures, syncope and headaches.   All other systems reviewed and are negative.      Physical Exam     Initial Vitals [09/17/20 1628]   BP Pulse Resp Temp SpO2   (!) 141/90 98 20 98.6 °F (37 °C) 99 %      MAP       --         Physical Exam    Nursing note and vitals reviewed.  Constitutional: She appears well-developed and well-nourished. She appears distressed (2/2 pain).   HENT:   Head: Normocephalic and atraumatic.   Mouth/Throat: Oropharynx is clear and moist.   Eyes: Conjunctivae and EOM are normal. Pupils are equal, round, and reactive to light.   Neck: Neck supple. No tracheal deviation present.   Cardiovascular: Normal rate, regular rhythm, normal heart sounds and intact distal pulses.   Pulmonary/Chest: Breath sounds normal. No respiratory distress.   Abdominal: Soft. She exhibits no distension. There is no abdominal tenderness. There is no rebound and no guarding.   Genitourinary:    Genitourinary Comments: Bladder volume 11 ml      Musculoskeletal: Normal range of motion. No tenderness or edema.      Comments: No midline spinal tenderness   Neurological: She is alert and oriented to person, place, and time. She has normal strength. No sensory deficit. GCS score is 15. GCS eye subscore is 4. GCS verbal subscore is 5. GCS motor subscore is 6.   No focal deficits; straight leg raise test positive on left lower extremity   Skin: Skin is warm. No rash noted. No erythema.   Psychiatric: She has a normal mood and affect. Her behavior is normal.         ED Course   Procedures  Labs Reviewed - No data to display       Imaging Results          X-Ray Lumbar Spine Ap And Lateral (Final result)  Result time 09/17/20 18:10:58    Final result by Ovidio Do Jr., MD (09/17/20 18:10:58)                 Impression:      No acute findings.  Mild degenerative change at L4-L5 with tiny anterior osteophytes.      Electronically signed by: Ovidio Do Jr., MD  Date:    09/17/2020  Time:    18:10             Narrative:    EXAMINATION:  XR LUMBAR SPINE AP AND LATERAL    CLINICAL HISTORY:  Back pain or radiculopathy, > 6 wks;    COMPARISON:  None    FINDINGS:  Vertebral body height and alignment are normal.  No acute fracture is evident.  Mild disc space height loss at L4-L5 with tiny anterior osteophytes.  The sacrum and sacroiliac joints are not unusual in appearance.  Paravertebral soft tissues are normal.                               X-Ray Abdomen Flat And Erect (Final result)  Result time 09/17/20 17:59:33    Final result by Ovidio Do Jr., MD (09/17/20 17:59:33)                 Impression:      Abundant gas throughout the colon.      Electronically signed by: Ovidio Do Jr., MD  Date:    09/17/2020  Time:    17:59             Narrative:    EXAMINATION:  XR ABDOMEN FLAT AND ERECT    CLINICAL HISTORY:  Constipation, unspecified    COMPARISON:  None.    FINDINGS:  Abundant gas throughout the colon.  No gross free air.  No air-fluid levels.  No  abnormal soft tissue calcifications. Regional bones are unremarkable.                              Medications   hydromorphone (PF) injection 1 mg (1 mg Intramuscular Given 9/17/20 1715)   ketorolac injection 30 mg (30 mg Intramuscular Given 9/17/20 1715)   gabapentin capsule 600 mg (600 mg Oral Given 9/17/20 1803)         6:14 PM: Care handed off to Dr. Merrill                       ED Course as of Sep 17 1919   Thu Sep 17, 2020   1901 Patient able to ambulate. Reports making urine this AM, and BM this AM. She only had 11 cc of urine in her bladder, and this is likely the reason she was having trouble urinating. I have low suspicion for cauda equina today. The patient is able to ambulate independently, and had no weakness or numbness on my repeat exam. Patient reports she has several doctors, and is currently being workup up further for her chronic pain issues. She had several buldging disks in November on her MRI's, and would benefit from Spine follow up which we will give today. Patient reports this pain is typical of her sciatic nerve pain, and we have recommended NSAID therapy for the next week. She is okay with plan, and was given specific instructions for return. Patient states her main concern was that she had stool going into her leg, and this was causing her pain, and is relieved that this is not the case.     [BA]   1919 7:19 PM Reassessment: I reassessed the pt.  The pt is resting comfortably and is NAD.  Pt states their sx have improved. I Discussed test results, shared treatment plan, specific conditions for return, and the need for f/u. I  Answered their questions at this time.  Pt understands and agrees to the plan.  The pt has remained hemodynamically stable through ED course and is stable for discharge.       [BA]      ED Course User Index  [BA] Arron Merrill MD            Clinical Impression:     ICD-10-CM ICD-9-CM   1. Chronic left-sided low back pain with left-sided sciatica  M54.42 724.2     G89.29 724.3     338.29   2. Constipation  K59.00 564.00                          ED Disposition Condition    Discharge Stable        ED Prescriptions     None        Follow-up Information     Follow up With Specialties Details Why Contact Info    [facility] Schedule an appointment as soon as possible for a visit in 2 days For re-evaluation and further treatment [address]   [place] [address]  [phone]      [facility] Emergency Medicine Go today If symptoms worsen, For re-evaluation and further treatment, As needed [address] [address] [address] [phone]    Jose Angeles MD Neurosurgery Schedule an appointment as soon as possible for a visit in 1 week For re-evaluation and further treatment [address] [facility]  [place] [address]  [phone]                                         Arron Merrill MD  09/17/20 7563

## 2020-09-17 NOTE — ED NOTES
"Patient states last time urinating was this morning and states last bowel movement was this morning. Patient currently c/o unable to urinate and defecate at this time. Patient c/o bilateral leg weakness stating due to unable to "poo and pee." Patient is shaking w/ tremors c/o "I'm in pain that's why I'm shaking." patient is able to stand, but c/o bilateral lower extremities weakness.   "

## 2020-09-18 NOTE — ED NOTES
"Pt. C/o of gas pain and lower left back pain that radiates down her left leg. Pt states this is consistent with previous sciatica pain over past two years. Pt stated that she was ready to go home. MD offered additional pain medication. Pt refused stating that she was reassured that her bowels and urine "were not backing up into her legs causing the pain". Pt able to ambulate without assistance. AAOx3  "

## 2020-09-25 ENCOUNTER — HOSPITAL ENCOUNTER (EMERGENCY)
Facility: HOSPITAL | Age: 40
Discharge: LEFT BEFORE DISCHARGE | End: 2020-09-25
Attending: EMERGENCY MEDICINE
Payer: MEDICAID

## 2020-09-25 VITALS
SYSTOLIC BLOOD PRESSURE: 147 MMHG | OXYGEN SATURATION: 100 % | TEMPERATURE: 98 F | DIASTOLIC BLOOD PRESSURE: 73 MMHG | HEART RATE: 83 BPM | RESPIRATION RATE: 18 BRPM | BODY MASS INDEX: 21.95 KG/M2 | WEIGHT: 120 LBS

## 2020-09-25 DIAGNOSIS — K59.00 CONSTIPATION, UNSPECIFIED CONSTIPATION TYPE: ICD-10-CM

## 2020-09-25 DIAGNOSIS — Z76.5 DRUG-SEEKING BEHAVIOR: Primary | ICD-10-CM

## 2020-09-25 LAB
ALBUMIN SERPL BCP-MCNC: 4.5 G/DL (ref 3.5–5.2)
ALP SERPL-CCNC: 63 U/L (ref 55–135)
ALT SERPL W/O P-5'-P-CCNC: 15 U/L (ref 10–44)
AMPHET+METHAMPHET UR QL: NORMAL
ANION GAP SERPL CALC-SCNC: 11 MMOL/L (ref 8–16)
AST SERPL-CCNC: 10 U/L (ref 10–40)
BARBITURATES UR QL SCN>200 NG/ML: NEGATIVE
BASOPHILS # BLD AUTO: 0.04 K/UL (ref 0–0.2)
BASOPHILS NFR BLD: 0.7 % (ref 0–1.9)
BENZODIAZ UR QL SCN>200 NG/ML: NORMAL
BILIRUB SERPL-MCNC: 0.5 MG/DL (ref 0.1–1)
BILIRUB UR QL STRIP: NEGATIVE
BUN SERPL-MCNC: 8 MG/DL (ref 6–20)
BZE UR QL SCN: NEGATIVE
CALCIUM SERPL-MCNC: 9.6 MG/DL (ref 8.7–10.5)
CANNABINOIDS UR QL SCN: NORMAL
CHLORIDE SERPL-SCNC: 108 MMOL/L (ref 95–110)
CLARITY UR REFRACT.AUTO: ABNORMAL
CO2 SERPL-SCNC: 22 MMOL/L (ref 23–29)
COLOR UR AUTO: YELLOW
CREAT SERPL-MCNC: 0.8 MG/DL (ref 0.5–1.4)
CREAT UR-MCNC: 230.3 MG/DL (ref 15–325)
DIFFERENTIAL METHOD: ABNORMAL
EOSINOPHIL # BLD AUTO: 0.3 K/UL (ref 0–0.5)
EOSINOPHIL NFR BLD: 4.5 % (ref 0–8)
ERYTHROCYTE [DISTWIDTH] IN BLOOD BY AUTOMATED COUNT: 11.9 % (ref 11.5–14.5)
EST. GFR  (AFRICAN AMERICAN): >60 ML/MIN/1.73 M^2
EST. GFR  (NON AFRICAN AMERICAN): >60 ML/MIN/1.73 M^2
GLUCOSE SERPL-MCNC: 97 MG/DL (ref 70–110)
GLUCOSE UR QL STRIP: NEGATIVE
HCT VFR BLD AUTO: 39 % (ref 37–48.5)
HGB BLD-MCNC: 13.6 G/DL (ref 12–16)
HGB UR QL STRIP: NEGATIVE
IMM GRANULOCYTES # BLD AUTO: 0.01 K/UL (ref 0–0.04)
IMM GRANULOCYTES NFR BLD AUTO: 0.2 % (ref 0–0.5)
KETONES UR QL STRIP: ABNORMAL
LEUKOCYTE ESTERASE UR QL STRIP: NEGATIVE
LYMPHOCYTES # BLD AUTO: 1.8 K/UL (ref 1–4.8)
LYMPHOCYTES NFR BLD: 30.3 % (ref 18–48)
MCH RBC QN AUTO: 31.6 PG (ref 27–31)
MCHC RBC AUTO-ENTMCNC: 34.9 G/DL (ref 32–36)
MCV RBC AUTO: 91 FL (ref 82–98)
METHADONE UR QL SCN>300 NG/ML: NEGATIVE
MONOCYTES # BLD AUTO: 0.5 K/UL (ref 0.3–1)
MONOCYTES NFR BLD: 7.9 % (ref 4–15)
NEUTROPHILS # BLD AUTO: 3.4 K/UL (ref 1.8–7.7)
NEUTROPHILS NFR BLD: 56.4 % (ref 38–73)
NITRITE UR QL STRIP: NEGATIVE
NRBC BLD-RTO: 0 /100 WBC
OPIATES UR QL SCN: NORMAL
PCP UR QL SCN>25 NG/ML: NEGATIVE
PH UR STRIP: 6 [PH] (ref 5–8)
PLATELET # BLD AUTO: 223 K/UL (ref 150–350)
PMV BLD AUTO: 10.7 FL (ref 9.2–12.9)
POTASSIUM SERPL-SCNC: 3.8 MMOL/L (ref 3.5–5.1)
PROT SERPL-MCNC: 6.8 G/DL (ref 6–8.4)
PROT UR QL STRIP: NEGATIVE
RBC # BLD AUTO: 4.3 M/UL (ref 4–5.4)
SODIUM SERPL-SCNC: 141 MMOL/L (ref 136–145)
SP GR UR STRIP: >=1.03 (ref 1–1.03)
TOXICOLOGY INFORMATION: NORMAL
URN SPEC COLLECT METH UR: ABNORMAL
UROBILINOGEN UR STRIP-ACNC: NEGATIVE EU/DL
WBC # BLD AUTO: 6.04 K/UL (ref 3.9–12.7)

## 2020-09-25 PROCEDURE — 85025 COMPLETE CBC W/AUTO DIFF WBC: CPT | Mod: ER

## 2020-09-25 PROCEDURE — 86803 HEPATITIS C AB TEST: CPT

## 2020-09-25 PROCEDURE — 80307 DRUG TEST PRSMV CHEM ANLYZR: CPT | Mod: ER

## 2020-09-25 PROCEDURE — 86703 HIV-1/HIV-2 1 RESULT ANTBDY: CPT

## 2020-09-25 PROCEDURE — 81003 URINALYSIS AUTO W/O SCOPE: CPT | Mod: 59,ER

## 2020-09-25 PROCEDURE — 99285 EMERGENCY DEPT VISIT HI MDM: CPT | Mod: 25,ER

## 2020-09-25 PROCEDURE — 25500020 PHARM REV CODE 255: Mod: ER | Performed by: PHYSICIAN ASSISTANT

## 2020-09-25 PROCEDURE — 80053 COMPREHEN METABOLIC PANEL: CPT | Mod: ER

## 2020-09-25 RX ORDER — KETOROLAC TROMETHAMINE 30 MG/ML
15 INJECTION, SOLUTION INTRAMUSCULAR; INTRAVENOUS
Status: DISCONTINUED | OUTPATIENT
Start: 2020-09-25 | End: 2020-09-25 | Stop reason: HOSPADM

## 2020-09-25 RX ADMIN — IOHEXOL 100 ML: 350 INJECTION, SOLUTION INTRAVENOUS at 03:09

## 2020-09-25 NOTE — ED NOTES
Karen DOCKERY and Mary [facility] assisting Sary SOSA with rectal exam. Per Mary [facility], pt was cooperative for rectal exam by Sary SOSA and completely opened her legs with no hesitation or problems. When Karen and Mary and were preparing for the in/out cath, pt began screaming that she was in too much pain and unable to open her legs. Sary SOSA unable to calm patient down. Pt insisting on signing out AMA after that and stated that she wants to go to [facility].

## 2020-09-25 NOTE — ED NOTES
Pt refused wheelchair and walked out of ER with sister Stacia. Ambulatory without assistance and no obvious difficulty.

## 2020-09-25 NOTE — ED NOTES
"Per Sary SOSA, pt to sign out AMA. Sister states "I'm so sorry, I know she is full of shit and won't cooperate for anything."  "

## 2020-09-25 NOTE — ED PROVIDER NOTES
"   History      Chief Complaint   Patient presents with    Leg Swelling     BASIL leg numbness, left leg swelling. also reports "something is coming out of my vagina and it smells like poop"       Review of patient's allergies indicates:  No Known Allergies     HPI   HPI    9/25/2020, 4:10 PM   History obtained from the patient      History of Present Illness: Isabelle Bonds is a 40 y.o. female patient who presents to the Emergency Department for multiple complaints.  Constipation for 6 days, bilateral leg pain, swelling and numbness, and concern about where her stool is going, "Maybe out of my pores? Or out of my vagina, or into my left thigh?"   She says she has been using laxatives several times per day for last week.  Symptoms are constant and moderate in severity.  The patient describes the symptoms as achy.  Denies bladder/bowel dysfunction, fever, saddle anesthesia, or focal weakness.   No further complaints or concerns at this time.           PCP: VIVIANA Weaver       Past Medical History:  Past Medical History:   Diagnosis Date    Anxiety     Cerebral aneurysm rupture     Chronic headache     Depression     Pseudoseizures 8/16/2017    Stroke          Past Surgical History:  Past Surgical History:   Procedure Laterality Date    BRAIN SURGERY      CHOLECYSTECTOMY      CRANIOTOMY      HERNIA REPAIR      HYSTERECTOMY             Family History:  Family History   Problem Relation Age of Onset    No Known Problems Mother     Arthritis Father            Social History:  Social History     Tobacco Use    Smoking status: Current Every Day Smoker     Packs/day: 0.50     Types: Cigarettes    Smokeless tobacco: Never Used   Substance and Sexual Activity    Alcohol use: Yes     Comment: occasional    Drug use: No    Sexual activity: Not Currently     Birth control/protection: See Surgical Hx       ROS   Review of Systems     Review of Systems   Constitutional: Negative for chills and fever. "   HENT: Negative for facial swelling and trouble swallowing.    Eyes: Negative for discharge, redness and visual disturbance.   Respiratory: Negative for chest tightness and shortness of breath.    Cardiovascular: Negative for chest pain and leg swelling.   Gastrointestinal: Positive for abdominal pain and constipation. Negative for diarrhea and vomiting.   Genitourinary: Negative for decreased urine volume and dysuria.   Musculoskeletal: Negative for joint swelling and neck stiffness.   Skin: Negative for rash and wound.   Neurological: Negative for syncope and facial asymmetry.   All other systems reviewed and are negative.      Physical Exam      Initial Vitals [09/25/20 1132]   BP Pulse Resp Temp SpO2   (!) 148/81 (!) 122 20 98.1 °F (36.7 °C) 98 %      MAP       --         Physical Exam  Vital signs and nursing notes reviewed.  Constitutional: Patient is in NAD. Awake and alert. Well-developed and well-nourished.  Head: Atraumatic. Normocephalic.  Eyes: PERRL. EOM intact. Conjunctivae nl. No scleral icterus.  ENT: Mucous membranes are moist. Oropharynx is clear.  Neck: Supple. No JVD. No lymphadenopathy.  No meningismus.  Nontender  Cardiovascular: Regular rate and rhythm. No murmurs, rubs, or gallops. Distal pulses are 2+ and symmetric.  Pulmonary/Chest: No respiratory distress. Clear to auscultation bilaterally. No wheezing, rales, or rhonchi.  Abdominal: Soft. Non-distended. No TTP. No rebound, guarding, or rigidity. Good bowel sounds. Strong rectal tone, no stool in rectal vault  Genitourinary: No CVA tenderness.  No discharge, bleeding or mass.  No signs of stool in vagina.  Musculoskeletal: Moves all extremities. No edema.   Non tender c/t spine.  Lumbar spine with mild bilateral paraspinous ttp, no midline ttp or step. +SLR  Skin: Warm and dry.  Neurological: Awake and alert. No acute focal neurological deficits are appreciated.  5/5 x 4 strength.  Strong and equal foot plantar and dorsiflexion.  Strong  rectal tone.  Ambulates with no difficulty  Psychiatric: Normal affect. Good eye contact. Mild paranoia and anxiety.      ED Course      Procedures  ED Vital Signs:  Vitals:    09/25/20 1132 09/25/20 1431   BP: (!) 148/81 (!) 147/73   Pulse: (!) 122 83   Resp: 20 18   Temp: 98.1 °F (36.7 °C)    TempSrc: Oral    SpO2: 98% 100%   Weight: 54.4 kg (120 lb)          Results for orders placed or performed during the hospital encounter of 09/25/20   CBC auto differential   Result Value Ref Range    WBC 6.04 3.90 - 12.70 K/uL    RBC 4.30 4.00 - 5.40 M/uL    Hemoglobin 13.6 12.0 - 16.0 g/dL    Hematocrit 39.0 37.0 - 48.5 %    Mean Corpuscular Volume 91 82 - 98 fL    Mean Corpuscular Hemoglobin 31.6 (H) 27.0 - 31.0 pg    Mean Corpuscular Hemoglobin Conc 34.9 32.0 - 36.0 g/dL    RDW 11.9 11.5 - 14.5 %    Platelets 223 150 - 350 K/uL    MPV 10.7 9.2 - 12.9 fL    Immature Granulocytes 0.2 0.0 - 0.5 %    Gran # (ANC) 3.4 1.8 - 7.7 K/uL    Immature Grans (Abs) 0.01 0.00 - 0.04 K/uL    Lymph # 1.8 1.0 - 4.8 K/uL    Mono # 0.5 0.3 - 1.0 K/uL    Eos # 0.3 0.0 - 0.5 K/uL    Baso # 0.04 0.00 - 0.20 K/uL    nRBC 0 0 /100 WBC    Gran% 56.4 38.0 - 73.0 %    Lymph% 30.3 18.0 - 48.0 %    Mono% 7.9 4.0 - 15.0 %    Eosinophil% 4.5 0.0 - 8.0 %    Basophil% 0.7 0.0 - 1.9 %    Differential Method Automated    Comprehensive metabolic panel   Result Value Ref Range    Sodium 141 136 - 145 mmol/L    Potassium 3.8 3.5 - 5.1 mmol/L    Chloride 108 95 - 110 mmol/L    CO2 22 (L) 23 - 29 mmol/L    Glucose 97 70 - 110 mg/dL    BUN, Bld 8 6 - 20 mg/dL    Creatinine 0.8 0.5 - 1.4 mg/dL    Calcium 9.6 8.7 - 10.5 mg/dL    Total Protein 6.8 6.0 - 8.4 g/dL    Albumin 4.5 3.5 - 5.2 g/dL    Total Bilirubin 0.5 0.1 - 1.0 mg/dL    Alkaline Phosphatase 63 55 - 135 U/L    AST 10 10 - 40 U/L    ALT 15 10 - 44 U/L    Anion Gap 11 8 - 16 mmol/L    eGFR if African American >60.0 >60 mL/min/1.73 m^2    eGFR if non African American >60.0 >60 mL/min/1.73 m^2   Urinalysis,  Reflex to Urine Culture Urine, Clean Catch    Specimen: Urine   Result Value Ref Range    Specimen UA Urine, Clean Catch     Color, UA Yellow Yellow, Straw, Jessy    Appearance, UA Hazy (A) Clear    pH, UA 6.0 5.0 - 8.0    Specific Gravity, UA >=1.030 (A) 1.005 - 1.030    Protein, UA Negative Negative    Glucose, UA Negative Negative    Ketones, UA 1+ (A) Negative    Bilirubin (UA) Negative Negative    Occult Blood UA Negative Negative    Nitrite, UA Negative Negative    Urobilinogen, UA Negative <2.0 EU/dL    Leukocytes, UA Negative Negative   Drug screen panel, emergency   Result Value Ref Range    Benzodiazepines Presumptve Positive     Methadone metabolites Negative     Cocaine (Metab.) Negative     Opiate Scrn, Ur Presumptive Positive     Barbiturate Screen, Ur Negative     Amphetamine Screen, Ur Presumptive Positive     THC Presumptive Positive     Phencyclidine Negative     Creatinine, Random Ur 230.3 15.0 - 325.0 mg/dL    Toxicology Information SEE COMMENT              Imaging Results:  Imaging Results          CT Abdomen Pelvis With Contrast (Final result)  Result time 09/25/20 15:13:30    Final result by Julio C Mcmahan MD (09/25/20 15:13:30)                 Impression:      Moderate constipation.    Moderate bladder distension.    All CT scans at this facility use dose modulation, iterative reconstruction, and/or weight based dosing when appropriate to reduce radiation dose to as low as reasonable achievable.      Electronically signed by: Julio C Mcmahan MD  Date:    09/25/2020  Time:    15:13             Narrative:    EXAMINATION:  CT ABDOMEN PELVIS WITH CONTRAST    CLINICAL HISTORY:  lower abdominal pain;    TECHNIQUE:  Low dose axial images, sagittal and coronal reformations were obtained from the lung bases to the pubic symphysis following the IV administration of 75 mL of Omnipaque 350.  Oral contrast was not administered.    COMPARISON:  09/17/2020    FINDINGS:  Heart: Normal size. No  effusion.    Lung Bases: Clear.    Liver: Normal size and attenuation. No solid lesions.  Focal fat is suspected along the falciform ligament.  Subcentimeter hypodensity within the right hepatic lobe likely reflects a small cyst.  Portal vein is patent.    Gallbladder: No calcified gallstones.    Bile Ducts: No dilatation.    Pancreas: No mass. No peripancreatic fat stranding.    Spleen: Normal.    Adrenals: Normal.    Kidneys/Ureters: Normal enhancement.  No mass or  hydroureteronephrosis.    Bladder: No wall thickening.  Moderate bladder distension.    Reproductive organs: Normal.    GI Tract/Mesentery: No evidence of bowel obstruction or inflammation.  Moderate constipation.  Normal appendix.    Peritoneal Space: No ascites or free air.    Retroperitoneum: No significant adenopathy.    Abdominal wall: Normal.    Vasculature: No aneurysm. Aorta demonstrates atherosclerotic disease.    Bones: No acute fracture. No suspicious lytic or sclerotic lesions.                                   The Emergency Provider reviewed the vital signs and test results, which are outlined above.    ED Discussion         Medication(s) given in the ER:  Medications   ketorolac injection 15 mg (has no administration in time range)   iohexoL (OMNIPAQUE 350) injection 75 mL (100 mLs Intravenous Given 9/25/20 1501)                   New Prescriptions    No medications on file          Medical Decision Making      Sister at bedside during entire visit.  I asked patient if it is ok to discuss her treatment and results with sister present.  She said that she wanted her sister present for everything.      Pt refused to give urine although CT shows distended bladder.  Concerned about possible bladder dysfunction I ordered post void cath.  Pt refused to open knees for catheter (because it hurts too much) although she just opened knees for me to do pelvic exam and rectal exam.  She repeatedly asked for pain medicine.  When offered toradol she  "became angry and said that wont help.  She says she wants to go to another ER since we're "not helping her."  Patient got out of bed and ambulated to front door with no difficulty.  Drug screen positive for amphetimines, thc, opiates, and benzos.      The patient is over the age of 18 years, exhibits no evidence of an altered level of consciousness, and possesses appropriate decision-making capacity based on their ability to understand and communicate rationally.  Patient was advised, that, for an optimal recovery, they should be responsible for complying with the individualized treatment plan provided today by the medical professionals at [facility].  This includes taking medications as directed, reviewing and understanding all discharge instructions, and scheduling any appointments that were recommended.  I informed the patient that failing to take responsibility for their health and safety and not complying with the recommendations provided to today is deemed to be against our medical advice. The patient was provided clear and verbal details regarding alternatives, benefits, risks, and complications related to their condition. In addition, we reiterated the seriousness of their condition and our recommendations for urgent follow-up care with a qualified healthcare provider capable of addressing their specific needs.  Furthermore, the patient was made aware of the serious complications that may be associated with their condition, including: stroke, permanent disability, paralysis, loss of limb(s), and death.  I have personally explained to the patient that choosing to leave against medical advise may result in permanent bodily harm or death. I again discussed at great length that without further evaluation and monitoring there may be unforeseen circumstances and/or deterioration causing permanent bodily harm or death as a result of his/her choice. The patient verbalized these risks back to me in laymans terms.  The " patient was able to discuss the treatment that was recommended and, was aware of specific risks associated with the refusal of care relating to their specific condition.  Patient was instructed to return to the emergency department if their condition changes or they wish to comply with our treatment recommendations.           MDM               Clinical Impression:        ICD-10-CM ICD-9-CM   1. Drug-seeking behavior  Z76.5 305.90   2. Constipation, unspecified constipation type  K59.00 564.00               Sary Hendricks PA-C  09/25/20 1637

## 2020-09-28 LAB
HCV AB SERPL QL IA: NEGATIVE
HIV 1+2 AB+HIV1 P24 AG SERPL QL IA: NEGATIVE

## 2021-05-07 NOTE — DISCHARGE INSTRUCTIONS
Your blood pressure was elevated in the ED.  You may have high blood pressure.   Keep a log of your blood pressure and follow up with a Primary Care Provider within the next two weeks. Call [phone] for appointment.     
[id number]

## 2022-03-15 ENCOUNTER — HOSPITAL ENCOUNTER (EMERGENCY)
Facility: HOSPITAL | Age: 42
Discharge: NURSING, REHAB OR LONG-TERM CARE | End: 2022-03-15
Attending: EMERGENCY MEDICINE
Payer: MEDICAID

## 2022-03-15 VITALS
SYSTOLIC BLOOD PRESSURE: 155 MMHG | OXYGEN SATURATION: 99 % | HEART RATE: 100 BPM | WEIGHT: 100 LBS | BODY MASS INDEX: 18.29 KG/M2 | TEMPERATURE: 99 F | RESPIRATION RATE: 16 BRPM | DIASTOLIC BLOOD PRESSURE: 91 MMHG

## 2022-03-15 DIAGNOSIS — R00.0 TACHYCARDIA: ICD-10-CM

## 2022-03-15 DIAGNOSIS — F19.10 SUBSTANCE ABUSE: Primary | ICD-10-CM

## 2022-03-15 DIAGNOSIS — F19.10 POLYSUBSTANCE ABUSE: ICD-10-CM

## 2022-03-15 LAB
ALBUMIN SERPL BCP-MCNC: 4.4 G/DL (ref 3.5–5.2)
ALP SERPL-CCNC: 68 U/L (ref 55–135)
ALT SERPL W/O P-5'-P-CCNC: 19 U/L (ref 10–44)
AMPHET+METHAMPHET UR QL: ABNORMAL
ANION GAP SERPL CALC-SCNC: 10 MMOL/L (ref 8–16)
APAP SERPL-MCNC: <3 UG/ML (ref 10–20)
AST SERPL-CCNC: 11 U/L (ref 10–40)
BARBITURATES UR QL SCN>200 NG/ML: ABNORMAL
BASOPHILS # BLD AUTO: 0.04 K/UL (ref 0–0.2)
BASOPHILS NFR BLD: 0.4 % (ref 0–1.9)
BENZODIAZ UR QL SCN>200 NG/ML: NEGATIVE
BILIRUB SERPL-MCNC: 0.5 MG/DL (ref 0.1–1)
BILIRUB UR QL STRIP: NEGATIVE
BUN SERPL-MCNC: 13 MG/DL (ref 6–20)
BZE UR QL SCN: NEGATIVE
CALCIUM SERPL-MCNC: 9.2 MG/DL (ref 8.7–10.5)
CANNABINOIDS UR QL SCN: ABNORMAL
CHLORIDE SERPL-SCNC: 106 MMOL/L (ref 95–110)
CLARITY UR REFRACT.AUTO: CLEAR
CO2 SERPL-SCNC: 23 MMOL/L (ref 23–29)
COLOR UR AUTO: YELLOW
CREAT SERPL-MCNC: 0.8 MG/DL (ref 0.5–1.4)
CREAT UR-MCNC: 72.9 MG/DL (ref 15–325)
DIFFERENTIAL METHOD: ABNORMAL
EOSINOPHIL # BLD AUTO: 0.1 K/UL (ref 0–0.5)
EOSINOPHIL NFR BLD: 1.1 % (ref 0–8)
ERYTHROCYTE [DISTWIDTH] IN BLOOD BY AUTOMATED COUNT: 11.6 % (ref 11.5–14.5)
EST. GFR  (AFRICAN AMERICAN): >60 ML/MIN/1.73 M^2
EST. GFR  (NON AFRICAN AMERICAN): >60 ML/MIN/1.73 M^2
ETHANOL SERPL-MCNC: <10 MG/DL
GLUCOSE SERPL-MCNC: 113 MG/DL (ref 70–110)
GLUCOSE UR QL STRIP: NEGATIVE
HCT VFR BLD AUTO: 40.7 % (ref 37–48.5)
HCV AB SERPL QL IA: NEGATIVE
HEP C VIRUS HOLD SPECIMEN: NORMAL
HGB BLD-MCNC: 14 G/DL (ref 12–16)
HGB UR QL STRIP: NEGATIVE
HIV 1+2 AB+HIV1 P24 AG SERPL QL IA: NEGATIVE
IMM GRANULOCYTES # BLD AUTO: 0.04 K/UL (ref 0–0.04)
IMM GRANULOCYTES NFR BLD AUTO: 0.4 % (ref 0–0.5)
KETONES UR QL STRIP: NEGATIVE
LEUKOCYTE ESTERASE UR QL STRIP: NEGATIVE
LYMPHOCYTES # BLD AUTO: 1.5 K/UL (ref 1–4.8)
LYMPHOCYTES NFR BLD: 14.8 % (ref 18–48)
MCH RBC QN AUTO: 31.7 PG (ref 27–31)
MCHC RBC AUTO-ENTMCNC: 34.4 G/DL (ref 32–36)
MCV RBC AUTO: 92 FL (ref 82–98)
METHADONE UR QL SCN>300 NG/ML: NEGATIVE
MONOCYTES # BLD AUTO: 0.5 K/UL (ref 0.3–1)
MONOCYTES NFR BLD: 4.5 % (ref 4–15)
NEUTROPHILS # BLD AUTO: 8.2 K/UL (ref 1.8–7.7)
NEUTROPHILS NFR BLD: 78.8 % (ref 38–73)
NITRITE UR QL STRIP: NEGATIVE
NRBC BLD-RTO: 0 /100 WBC
OPIATES UR QL SCN: NEGATIVE
PCP UR QL SCN>25 NG/ML: NEGATIVE
PH UR STRIP: 6 [PH] (ref 5–8)
PLATELET # BLD AUTO: 178 K/UL (ref 150–450)
PMV BLD AUTO: 10.3 FL (ref 9.2–12.9)
POTASSIUM SERPL-SCNC: 4 MMOL/L (ref 3.5–5.1)
PROT SERPL-MCNC: 7.2 G/DL (ref 6–8.4)
PROT UR QL STRIP: NEGATIVE
RBC # BLD AUTO: 4.41 M/UL (ref 4–5.4)
SALICYLATES SERPL-MCNC: <5 MG/DL (ref 15–30)
SODIUM SERPL-SCNC: 139 MMOL/L (ref 136–145)
SP GR UR STRIP: 1.01 (ref 1–1.03)
TOXICOLOGY INFORMATION: ABNORMAL
URN SPEC COLLECT METH UR: NORMAL
UROBILINOGEN UR STRIP-ACNC: NEGATIVE EU/DL
WBC # BLD AUTO: 10.34 K/UL (ref 3.9–12.7)

## 2022-03-15 PROCEDURE — 87389 HIV-1 AG W/HIV-1&-2 AB AG IA: CPT | Performed by: EMERGENCY MEDICINE

## 2022-03-15 PROCEDURE — 25000003 PHARM REV CODE 250: Mod: ER | Performed by: EMERGENCY MEDICINE

## 2022-03-15 PROCEDURE — 85025 COMPLETE CBC W/AUTO DIFF WBC: CPT | Mod: ER | Performed by: EMERGENCY MEDICINE

## 2022-03-15 PROCEDURE — 93010 ELECTROCARDIOGRAM REPORT: CPT | Mod: ,,, | Performed by: INTERNAL MEDICINE

## 2022-03-15 PROCEDURE — S4991 NICOTINE PATCH NONLEGEND: HCPCS | Mod: ER | Performed by: EMERGENCY MEDICINE

## 2022-03-15 PROCEDURE — 80179 DRUG ASSAY SALICYLATE: CPT | Mod: ER | Performed by: EMERGENCY MEDICINE

## 2022-03-15 PROCEDURE — 80143 DRUG ASSAY ACETAMINOPHEN: CPT | Mod: ER | Performed by: EMERGENCY MEDICINE

## 2022-03-15 PROCEDURE — 82077 ASSAY SPEC XCP UR&BREATH IA: CPT | Mod: ER | Performed by: EMERGENCY MEDICINE

## 2022-03-15 PROCEDURE — 96360 HYDRATION IV INFUSION INIT: CPT | Mod: ER

## 2022-03-15 PROCEDURE — 81003 URINALYSIS AUTO W/O SCOPE: CPT | Mod: ER,59 | Performed by: EMERGENCY MEDICINE

## 2022-03-15 PROCEDURE — 80307 DRUG TEST PRSMV CHEM ANLYZR: CPT | Mod: ER | Performed by: EMERGENCY MEDICINE

## 2022-03-15 PROCEDURE — 80053 COMPREHEN METABOLIC PANEL: CPT | Mod: ER | Performed by: EMERGENCY MEDICINE

## 2022-03-15 PROCEDURE — 86803 HEPATITIS C AB TEST: CPT | Performed by: EMERGENCY MEDICINE

## 2022-03-15 PROCEDURE — 93010 EKG 12-LEAD: ICD-10-PCS | Mod: ,,, | Performed by: INTERNAL MEDICINE

## 2022-03-15 PROCEDURE — 99284 EMERGENCY DEPT VISIT MOD MDM: CPT | Mod: 25,ER

## 2022-03-15 PROCEDURE — 93005 ELECTROCARDIOGRAM TRACING: CPT | Mod: ER

## 2022-03-15 RX ORDER — VORTIOXETINE 10 MG/1
1 TABLET, FILM COATED ORAL DAILY
COMMUNITY
Start: 2022-02-28

## 2022-03-15 RX ORDER — LORAZEPAM 1 MG/1
1 TABLET ORAL
Status: COMPLETED | OUTPATIENT
Start: 2022-03-15 | End: 2022-03-15

## 2022-03-15 RX ORDER — LEVETIRACETAM 250 MG/1
500 TABLET ORAL
Status: COMPLETED | OUTPATIENT
Start: 2022-03-15 | End: 2022-03-15

## 2022-03-15 RX ORDER — IBUPROFEN 200 MG
1 TABLET ORAL
Status: DISCONTINUED | OUTPATIENT
Start: 2022-03-15 | End: 2022-03-15 | Stop reason: HOSPADM

## 2022-03-15 RX ORDER — LINACLOTIDE 145 UG/1
145 CAPSULE, GELATIN COATED ORAL EVERY MORNING
COMMUNITY
Start: 2022-02-28 | End: 2023-10-19 | Stop reason: SDUPTHER

## 2022-03-15 RX ORDER — LEVETIRACETAM 500 MG/1
500 TABLET ORAL 2 TIMES DAILY
COMMUNITY
End: 2023-10-19 | Stop reason: SDUPTHER

## 2022-03-15 RX ADMIN — SODIUM CHLORIDE 1000 ML: 0.9 INJECTION, SOLUTION INTRAVENOUS at 10:03

## 2022-03-15 RX ADMIN — LEVETIRACETAM 500 MG: 250 TABLET ORAL at 05:03

## 2022-03-15 RX ADMIN — LORAZEPAM 1 MG: 1 TABLET ORAL at 05:03

## 2022-03-15 RX ADMIN — NICOTINE 1 PATCH: 21 PATCH, EXTENDED RELEASE TRANSDERMAL at 05:03

## 2022-03-15 NOTE — ED PROVIDER NOTES
Encounter Date: 3/15/2022       History     Chief Complaint   Patient presents with    Addiction Problem     Accepted at [facility] in [place], pt voluntary, request medical clearance, daily meth x2 years     The patient would like to get in to drug rehab, and has a bed at a local facility. T hey are requesting blood work and medical clearance first.  Patient denies any complaints other than some mild anxiety.    The history is provided by the patient.     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/2017    Stroke      Past Surgical History:   Procedure Laterality Date    BRAIN SURGERY      CHOLECYSTECTOMY      CRANIOTOMY      HERNIA REPAIR      HYSTERECTOMY       Family History   Problem Relation Age of Onset    No Known Problems Mother     Arthritis Father      Social History     Tobacco Use    Smoking status: Current Every Day Smoker     Packs/day: 0.50     Types: Cigarettes    Smokeless tobacco: Never Used   Substance Use Topics    Alcohol use: Yes     Comment: occasional    Drug use: Yes     Types: Methamphetamines     Comment: Dialy x2 years     Review of Systems   Constitutional: Negative for fever.   HENT: Negative for sore throat.    Respiratory: Negative for shortness of breath.    Cardiovascular: Negative for chest pain.   Gastrointestinal: Negative for nausea.   Genitourinary: Negative for dysuria.   Musculoskeletal: Negative for back pain.   Skin: Negative for rash.   Neurological: Negative for weakness.   Hematological: Does not bruise/bleed easily.       Physical Exam     Initial Vitals [03/15/22 1023]   BP Pulse Resp Temp SpO2   (!) 157/98 85 16 97.9 °F (36.6 °C) 100 %      MAP       --         Physical Exam    Nursing note and vitals reviewed.  Constitutional: She appears well-developed and well-nourished. No  distress.   HENT:   Head: Normocephalic and atraumatic.   Mouth/Throat: Oropharynx is clear and moist.   Eyes: Conjunctivae and EOM are normal. Pupils are equal, round, and reactive to light.   Neck: Neck supple.   Normal range of motion.  Cardiovascular: Regular rhythm and normal heart sounds. Tachycardia present.  Exam reveals no gallop and no friction rub.    No murmur heard.  Pulmonary/Chest: Breath sounds normal. No respiratory distress. She has no wheezes. She has no rhonchi. She has no rales.   Abdominal: Abdomen is soft. Bowel sounds are normal. She exhibits no distension and no mass. There is no abdominal tenderness. There is no rebound and no guarding.   Musculoskeletal:         General: No tenderness or edema. Normal range of motion.      Cervical back: Normal range of motion and neck supple.     Neurological: She is alert and oriented to person, place, and time. She has normal strength.   Skin: Skin is warm and dry. No rash noted.   Psychiatric: She has a normal mood and affect. Thought content normal.         ED Course   Procedures  Labs Reviewed   CBC W/ AUTO DIFFERENTIAL - Abnormal; Notable for the following components:       Result Value    MCH 31.7 (*)     Gran # (ANC) 8.2 (*)     Gran % 78.8 (*)     Lymph % 14.8 (*)     All other components within normal limits    Narrative:     Release to patient->Immediate   COMPREHENSIVE METABOLIC PANEL - Abnormal; Notable for the following components:    Glucose 113 (*)     All other components within normal limits    Narrative:     Release to patient->Immediate   DRUG SCREEN PANEL, URINE EMERGENCY - Abnormal; Notable for the following components:    Barbiturate Screen, Ur Presumptive Positive (*)     Amphetamine Screen, Ur Presumptive Positive (*)     THC Presumptive Positive (*)     All other components within normal limits    Narrative:     Specimen Source->Urine   SALICYLATE LEVEL - Abnormal; Notable for the following components:    Salicylate Lvl <5.0 (*)      All other components within normal limits    Narrative:     Release to patient->Immediate   ACETAMINOPHEN LEVEL - Abnormal; Notable for the following components:    Acetaminophen (Tylenol), Serum <3.0 (*)     All other components within normal limits    Narrative:     Release to patient->Immediate   URINALYSIS, REFLEX TO URINE CULTURE    Narrative:     Specimen Source->Urine   ALCOHOL,MEDICAL (ETHANOL)    Narrative:     Release to patient->Immediate   HIV 1 / 2 ANTIBODY   HEPATITIS C ANTIBODY   HEP C VIRUS HOLD SPECIMEN     EKG Readings: (Independently Interpreted)   Rhythm: Normal Sinus Rhythm. Heart Rate: 87. Ectopy: No Ectopy. Conduction: Normal. ST Segments: Normal ST Segments. T Waves: Normal. Clinical Impression: Normal Sinus Rhythm       Imaging Results    None          Medications   sodium chloride 0.9% bolus 1,000 mL (0 mLs Intravenous Stopped 3/15/22 1130)         ED Vital Signs:  Vitals:    03/15/22 1023   BP: (!) 157/98   Pulse: 85   Resp: 16   Temp: 97.9 °F (36.6 °C)   TempSrc: Oral   SpO2: 100%   Weight: 45.4 kg (100 lb)         Abnormal Lab Results:  Labs Reviewed   CBC W/ AUTO DIFFERENTIAL - Abnormal; Notable for the following components:       Result Value    MCH 31.7 (*)     Gran # (ANC) 8.2 (*)     Gran % 78.8 (*)     Lymph % 14.8 (*)     All other components within normal limits    Narrative:     Release to patient->Immediate   COMPREHENSIVE METABOLIC PANEL - Abnormal; Notable for the following components:    Glucose 113 (*)     All other components within normal limits    Narrative:     Release to patient->Immediate   DRUG SCREEN PANEL, URINE EMERGENCY - Abnormal; Notable for the following components:    Barbiturate Screen, Ur Presumptive Positive (*)     Amphetamine Screen, Ur Presumptive Positive (*)     THC Presumptive Positive (*)     All other components within normal limits    Narrative:     Specimen Source->Urine   SALICYLATE LEVEL - Abnormal; Notable for the following components:     Salicylate Lvl <5.0 (*)     All other components within normal limits    Narrative:     Release to patient->Immediate   ACETAMINOPHEN LEVEL - Abnormal; Notable for the following components:    Acetaminophen (Tylenol), Serum <3.0 (*)     All other components within normal limits    Narrative:     Release to patient->Immediate   URINALYSIS, REFLEX TO URINE CULTURE    Narrative:     Specimen Source->Urine   ALCOHOL,MEDICAL (ETHANOL)    Narrative:     Release to patient->Immediate   HIV 1 / 2 ANTIBODY   HEPATITIS C ANTIBODY   HEP C VIRUS HOLD SPECIMEN          All Lab Results:  Results for orders placed or performed during the hospital encounter of 03/15/22   CBC Auto Differential   Result Value Ref Range    WBC 10.34 3.90 - 12.70 K/uL    RBC 4.41 4.00 - 5.40 M/uL    Hemoglobin 14.0 12.0 - 16.0 g/dL    Hematocrit 40.7 37.0 - 48.5 %    MCV 92 82 - 98 fL    MCH 31.7 (H) 27.0 - 31.0 pg    MCHC 34.4 32.0 - 36.0 g/dL    RDW 11.6 11.5 - 14.5 %    Platelets 178 150 - 450 K/uL    MPV 10.3 9.2 - 12.9 fL    Immature Granulocytes 0.4 0.0 - 0.5 %    Gran # (ANC) 8.2 (H) 1.8 - 7.7 K/uL    Immature Grans (Abs) 0.04 0.00 - 0.04 K/uL    Lymph # 1.5 1.0 - 4.8 K/uL    Mono # 0.5 0.3 - 1.0 K/uL    Eos # 0.1 0.0 - 0.5 K/uL    Baso # 0.04 0.00 - 0.20 K/uL    nRBC 0 0 /100 WBC    Gran % 78.8 (H) 38.0 - 73.0 %    Lymph % 14.8 (L) 18.0 - 48.0 %    Mono % 4.5 4.0 - 15.0 %    Eosinophil % 1.1 0.0 - 8.0 %    Basophil % 0.4 0.0 - 1.9 %    Differential Method Automated    Comprehensive Metabolic Panel   Result Value Ref Range    Sodium 139 136 - 145 mmol/L    Potassium 4.0 3.5 - 5.1 mmol/L    Chloride 106 95 - 110 mmol/L    CO2 23 23 - 29 mmol/L    Glucose 113 (H) 70 - 110 mg/dL    BUN 13 6 - 20 mg/dL    Creatinine 0.8 0.5 - 1.4 mg/dL    Calcium 9.2 8.7 - 10.5 mg/dL    Total Protein 7.2 6.0 - 8.4 g/dL    Albumin 4.4 3.5 - 5.2 g/dL    Total Bilirubin 0.5 0.1 - 1.0 mg/dL    Alkaline Phosphatase 68 55 - 135 U/L    AST 11 10 - 40 U/L    ALT 19 10 - 44  U/L    Anion Gap 10 8 - 16 mmol/L    eGFR if African American >60.0 >60 mL/min/1.73 m^2    eGFR if non African American >60.0 >60 mL/min/1.73 m^2   Urinalysis, Reflex to Urine Culture Urine, Clean Catch    Specimen: Urine   Result Value Ref Range    Specimen UA Urine, Clean Catch     Color, UA Yellow Yellow, Straw, Jessy    Appearance, UA Clear Clear    pH, UA 6.0 5.0 - 8.0    Specific Gravity, UA 1.015 1.005 - 1.030    Protein, UA Negative Negative    Glucose, UA Negative Negative    Ketones, UA Negative Negative    Bilirubin (UA) Negative Negative    Occult Blood UA Negative Negative    Nitrite, UA Negative Negative    Urobilinogen, UA Negative <2.0 EU/dL    Leukocytes, UA Negative Negative   Drug screen panel, in-house   Result Value Ref Range    Benzodiazepines Negative Negative    Methadone metabolites Negative Negative    Cocaine (Metab.) Negative Negative    Opiate Scrn, Ur Negative Negative    Barbiturate Screen, Ur Presumptive Positive (A) Negative    Amphetamine Screen, Ur Presumptive Positive (A) Negative    THC Presumptive Positive (A) Negative    Phencyclidine Negative Negative    Creatinine, Urine 72.9 15.0 - 325.0 mg/dL    Toxicology Information SEE COMMENT    Ethanol   Result Value Ref Range    Alcohol, Serum <10 <10 mg/dL   Salicylate Level   Result Value Ref Range    Salicylate Lvl <5.0 (L) 15.0 - 30.0 mg/dL   Acetaminophen Level   Result Value Ref Range    Acetaminophen (Tylenol), Serum <3.0 (L) 10.0 - 20.0 ug/mL           Imaging Results:  Imaging Results    None            The Emergency Provider reviewed the vital signs and test results, which are outlined above.    ED Discussions:  12:03 PM: Reassessed pt at this time.  Pt states her condition has improved at this time. Discussed with pt all pertinent ED information and results. Discussed pt dx of drug abuse and plan of tx. Gave pt all f/u and return to the ED instructions. All questions and concerns were addressed at this time. Pt expresses  understanding of information and instructions, and is comfortable with plan to discharge. Pt is stable for discharge.                              Clinical Impression:   Final diagnoses:  [R00.0] Tachycardia  [F19.10] Substance abuse (Primary)  [F19.10] Polysubstance abuse          ED Disposition Condition    Discharge Stable        ED Prescriptions     None        Follow-up Information     Follow up With Specialties Details Why Contact Info    [facility]   [address]   [facility] [address]  [phone]             Tavo Osorio MD  03/15/22 9865

## 2022-03-16 NOTE — ED NOTES
Patient discharged with [facility]'s Transportation to rehab facility. Discharge papers, face sheet and copy of chart provided to  John Randall. Patient ambulated out with her belongings and in her clothes.

## 2023-08-29 ENCOUNTER — HOSPITAL ENCOUNTER (EMERGENCY)
Facility: HOSPITAL | Age: 43
Discharge: HOME | End: 2023-08-29
Attending: EMERGENCY MEDICINE
Payer: MEDICAID

## 2023-08-29 VITALS
RESPIRATION RATE: 19 BRPM | TEMPERATURE: 98 F | OXYGEN SATURATION: 100 % | SYSTOLIC BLOOD PRESSURE: 147 MMHG | DIASTOLIC BLOOD PRESSURE: 69 MMHG | HEART RATE: 67 BPM

## 2023-08-29 DIAGNOSIS — F15.10 METHAMPHETAMINE ABUSE: ICD-10-CM

## 2023-08-29 DIAGNOSIS — R53.83 FATIGUE, UNSPECIFIED TYPE: Primary | ICD-10-CM

## 2023-08-29 LAB
ALBUMIN SERPL BCP-MCNC: 3.9 G/DL (ref 3.5–5.2)
ALP SERPL-CCNC: 69 U/L (ref 55–135)
ALT SERPL W/O P-5'-P-CCNC: 25 U/L (ref 10–44)
AMPHET+METHAMPHET UR QL: ABNORMAL
ANION GAP SERPL CALC-SCNC: 9 MMOL/L (ref 8–16)
AST SERPL-CCNC: 21 U/L (ref 10–40)
BARBITURATES UR QL SCN>200 NG/ML: NEGATIVE
BASOPHILS # BLD AUTO: 0.03 K/UL (ref 0–0.2)
BASOPHILS NFR BLD: 0.4 % (ref 0–1.9)
BENZODIAZ UR QL SCN>200 NG/ML: NEGATIVE
BILIRUB SERPL-MCNC: 0.4 MG/DL (ref 0.1–1)
BILIRUB UR QL STRIP: NEGATIVE
BUN SERPL-MCNC: 11 MG/DL (ref 6–20)
BZE UR QL SCN: NEGATIVE
CALCIUM SERPL-MCNC: 9 MG/DL (ref 8.7–10.5)
CANNABINOIDS UR QL SCN: NEGATIVE
CHLORIDE SERPL-SCNC: 108 MMOL/L (ref 95–110)
CK SERPL-CCNC: 175 U/L (ref 20–180)
CLARITY UR REFRACT.AUTO: ABNORMAL
CO2 SERPL-SCNC: 22 MMOL/L (ref 23–29)
COLOR UR AUTO: YELLOW
CREAT SERPL-MCNC: 0.7 MG/DL (ref 0.5–1.4)
CREAT UR-MCNC: 42.6 MG/DL (ref 15–325)
DIFFERENTIAL METHOD: ABNORMAL
EOSINOPHIL # BLD AUTO: 0.1 K/UL (ref 0–0.5)
EOSINOPHIL NFR BLD: 1.6 % (ref 0–8)
ERYTHROCYTE [DISTWIDTH] IN BLOOD BY AUTOMATED COUNT: 11.5 % (ref 11.5–14.5)
EST. GFR  (NO RACE VARIABLE): >60 ML/MIN/1.73 M^2
ETHANOL SERPL-MCNC: <10 MG/DL
GLUCOSE SERPL-MCNC: 97 MG/DL (ref 70–110)
GLUCOSE UR QL STRIP: NEGATIVE
HCT VFR BLD AUTO: 39.5 % (ref 37–48.5)
HGB BLD-MCNC: 13.4 G/DL (ref 12–16)
HGB UR QL STRIP: NEGATIVE
IMM GRANULOCYTES # BLD AUTO: 0.03 K/UL (ref 0–0.04)
IMM GRANULOCYTES NFR BLD AUTO: 0.4 % (ref 0–0.5)
KETONES UR QL STRIP: NEGATIVE
LEUKOCYTE ESTERASE UR QL STRIP: NEGATIVE
LYMPHOCYTES # BLD AUTO: 1.3 K/UL (ref 1–4.8)
LYMPHOCYTES NFR BLD: 18.9 % (ref 18–48)
MCH RBC QN AUTO: 31.7 PG (ref 27–31)
MCHC RBC AUTO-ENTMCNC: 33.9 G/DL (ref 32–36)
MCV RBC AUTO: 93 FL (ref 82–98)
METHADONE UR QL SCN>300 NG/ML: NEGATIVE
MONOCYTES # BLD AUTO: 0.6 K/UL (ref 0.3–1)
MONOCYTES NFR BLD: 8.2 % (ref 4–15)
NEUTROPHILS # BLD AUTO: 5 K/UL (ref 1.8–7.7)
NEUTROPHILS NFR BLD: 70.5 % (ref 38–73)
NITRITE UR QL STRIP: NEGATIVE
NRBC BLD-RTO: 0 /100 WBC
OPIATES UR QL SCN: NEGATIVE
PCP UR QL SCN>25 NG/ML: NEGATIVE
PH UR STRIP: 8 [PH] (ref 5–8)
PLATELET # BLD AUTO: 218 K/UL (ref 150–450)
PMV BLD AUTO: 10.1 FL (ref 9.2–12.9)
POTASSIUM SERPL-SCNC: 4.5 MMOL/L (ref 3.5–5.1)
PROT SERPL-MCNC: 6.6 G/DL (ref 6–8.4)
PROT UR QL STRIP: NEGATIVE
RBC # BLD AUTO: 4.23 M/UL (ref 4–5.4)
SODIUM SERPL-SCNC: 139 MMOL/L (ref 136–145)
SP GR UR STRIP: 1.01 (ref 1–1.03)
TOXICOLOGY INFORMATION: ABNORMAL
TSH SERPL DL<=0.005 MIU/L-ACNC: 1.46 UIU/ML (ref 0.4–4)
URN SPEC COLLECT METH UR: ABNORMAL
UROBILINOGEN UR STRIP-ACNC: NEGATIVE EU/DL
WBC # BLD AUTO: 7.08 K/UL (ref 3.9–12.7)

## 2023-08-29 PROCEDURE — 25000003 PHARM REV CODE 250: Mod: ER | Performed by: EMERGENCY MEDICINE

## 2023-08-29 PROCEDURE — 82077 ASSAY SPEC XCP UR&BREATH IA: CPT | Mod: ER | Performed by: EMERGENCY MEDICINE

## 2023-08-29 PROCEDURE — 82550 ASSAY OF CK (CPK): CPT | Mod: ER | Performed by: EMERGENCY MEDICINE

## 2023-08-29 PROCEDURE — 84443 ASSAY THYROID STIM HORMONE: CPT | Mod: ER | Performed by: EMERGENCY MEDICINE

## 2023-08-29 PROCEDURE — 96360 HYDRATION IV INFUSION INIT: CPT | Mod: ER

## 2023-08-29 PROCEDURE — 80053 COMPREHEN METABOLIC PANEL: CPT | Mod: ER | Performed by: EMERGENCY MEDICINE

## 2023-08-29 PROCEDURE — 80307 DRUG TEST PRSMV CHEM ANLYZR: CPT | Mod: ER | Performed by: EMERGENCY MEDICINE

## 2023-08-29 PROCEDURE — 85025 COMPLETE CBC W/AUTO DIFF WBC: CPT | Mod: ER | Performed by: EMERGENCY MEDICINE

## 2023-08-29 PROCEDURE — 81003 URINALYSIS AUTO W/O SCOPE: CPT | Mod: ER,59 | Performed by: EMERGENCY MEDICINE

## 2023-08-29 PROCEDURE — 99284 EMERGENCY DEPT VISIT MOD MDM: CPT | Mod: 25,ER

## 2023-08-29 RX ADMIN — SODIUM CHLORIDE 1000 ML: 9 INJECTION, SOLUTION INTRAVENOUS at 12:08

## 2023-08-29 NOTE — ED PROVIDER NOTES
Encounter Date: 8/29/2023       History     Chief Complaint   Patient presents with    Fatigue     States she hasn't had any water in a few days and she has generalized weakness and fatigue.      Also, may have done meth yesterday,a nd not feeling well after that.    The history is provided by the patient.   Fatigue  This is a new problem. The current episode started more than 1 week ago. The problem occurs daily. The problem has not changed since onset.Pertinent negatives include no chest pain and no shortness of breath. Nothing aggravates the symptoms. Nothing relieves the symptoms.     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    Stroke      Past Surgical History:   Procedure Laterality Date    BRAIN SURGERY      CHOLECYSTECTOMY      CRANIOTOMY      HERNIA REPAIR      HYSTERECTOMY       Family History   Problem Relation Age of Onset    No Known Problems Mother     Arthritis Father      Social History     Tobacco Use    Smoking status: Every Day     Current packs/day: 0.50     Types: Cigarettes    Smokeless tobacco: Never   Substance Use Topics    Alcohol use: Yes     Comment: occasional    Drug use: Yes     Types: Methamphetamines     Comment: Dialy x2 years     Review of Systems   Constitutional:  Positive for fatigue. Negative for fever.   HENT:  Negative for sore throat.    Respiratory:  Negative for shortness of breath.    Cardiovascular:  Negative for chest pain.   Gastrointestinal:  Negative for nausea.   Genitourinary:  Negative for dysuria.   Musculoskeletal:  Negative for back pain.   Skin:  Negative for rash.   Neurological:  Negative for weakness.   Hematological:  Does not bruise/bleed easily.       Physical Exam     Initial Vitals [08/29/23 1006]   BP Pulse Resp Temp SpO2   (!) 146/86 82 20 97.9 °F (36.6 °C) 98 %      MAP       --          Physical Exam    Nursing note and vitals reviewed.  Constitutional: She appears well-developed and well-nourished. No distress.   HENT:   Head: Normocephalic and atraumatic.   Mouth/Throat: Oropharynx is clear and moist.   Eyes: Conjunctivae and EOM are normal. Pupils are equal, round, and reactive to light.   Neck: Neck supple.   Normal range of motion.  Cardiovascular:  Normal rate, regular rhythm and normal heart sounds.     Exam reveals no gallop and no friction rub.       No murmur heard.  Pulmonary/Chest: Breath sounds normal. No respiratory distress. She has no wheezes. She has no rhonchi. She has no rales.   Abdominal: Abdomen is soft. Bowel sounds are normal. She exhibits no distension and no mass. There is no abdominal tenderness. There is no rebound and no guarding.   Musculoskeletal:         General: No tenderness or edema. Normal range of motion.      Cervical back: Normal range of motion and neck supple.     Neurological: She is alert and oriented to person, place, and time. She has normal strength.   Skin: Skin is warm and dry. No rash noted.   Psychiatric: She has a normal mood and affect. Thought content normal.         ED Course   Procedures  Labs Reviewed   CBC W/ AUTO DIFFERENTIAL - Abnormal; Notable for the following components:       Result Value    MCH 31.7 (*)     All other components within normal limits   COMPREHENSIVE METABOLIC PANEL - Abnormal; Notable for the following components:    CO2 22 (*)     All other components within normal limits   URINALYSIS, REFLEX TO URINE CULTURE - Abnormal; Notable for the following components:    Appearance, UA Hazy (*)     All other components within normal limits    Narrative:     Specimen Source->Urine   DRUG SCREEN PANEL, URINE EMERGENCY - Abnormal; Notable for the following components:    Amphetamine Screen, Ur Presumptive Positive (*)     All other components within normal limits    Narrative:     Specimen Source->Urine   TSH   ALCOHOL,MEDICAL  (ETHANOL)   CK          Imaging Results    None          Medications   sodium chloride 0.9% bolus 1,000 mL 1,000 mL (1,000 mLs Intravenous New Bag 8/29/23 1246)     Medical Decision Making  Used meth yesterday, and doesn't feel well.  DDX: dehydration, electrolyte abnormality, methabuse    Problems Addressed:  Methamphetamine abuse: chronic illness or injury with exacerbation, progression, or side effects of treatment that poses a threat to life or bodily functions    Amount and/or Complexity of Data Reviewed  Labs: ordered.                               Clinical Impression:   Final diagnoses:  [R53.83] Fatigue, unspecified type (Primary)  [F15.10] Methamphetamine abuse        ED Disposition Condition    Discharge Stable          ED Prescriptions    None       Follow-up Information       Follow up With Specialties Details Why Contact Info    Kassandra Cody, FNP Family Medicine   [address]   [facility] [address]  [phone]               Tavo Osorio MD  08/29/23 4571
